# Patient Record
Sex: FEMALE | Race: ASIAN | NOT HISPANIC OR LATINO | Employment: FULL TIME | URBAN - METROPOLITAN AREA
[De-identification: names, ages, dates, MRNs, and addresses within clinical notes are randomized per-mention and may not be internally consistent; named-entity substitution may affect disease eponyms.]

---

## 2019-06-01 ENCOUNTER — HOSPITAL ENCOUNTER (OUTPATIENT)
Dept: URGENT CARE | Facility: CLINIC | Age: 34
Discharge: HOME | End: 2019-06-01
Attending: PHYSICIAN ASSISTANT

## 2019-06-03 LAB — BACTERIA SPEC AEROBE CULT: NORMAL

## 2020-11-17 ENCOUNTER — HOSPITAL ENCOUNTER (OUTPATIENT)
Dept: OTHER | Facility: HOSPITAL | Age: 35
Discharge: HOME | End: 2020-11-17
Attending: NURSE PRACTITIONER

## 2020-11-17 ENCOUNTER — OFFICE VISIT CONVERTED (OUTPATIENT)
Dept: INTERNAL MEDICINE | Facility: CLINIC | Age: 35
End: 2020-11-17
Attending: NURSE PRACTITIONER

## 2020-11-18 LAB
25(OH)D3 SERPL-MCNC: 16 NG/ML (ref 30–100)
ALBUMIN SERPL-MCNC: 4.4 G/DL (ref 3.5–5)
ALBUMIN/GLOB SERPL: 1.3 {RATIO} (ref 1.4–2.6)
ALP SERPL-CCNC: 59 U/L (ref 42–98)
ALT SERPL-CCNC: 65 U/L (ref 10–40)
ANION GAP SERPL CALC-SCNC: 18 MMOL/L (ref 8–19)
AST SERPL-CCNC: 45 U/L (ref 15–50)
BASOPHILS # BLD AUTO: 0.07 10*3/UL (ref 0–0.2)
BASOPHILS NFR BLD AUTO: 0.6 % (ref 0–3)
BILIRUB SERPL-MCNC: <0.15 MG/DL (ref 0.2–1.3)
BUN SERPL-MCNC: 9 MG/DL (ref 5–25)
BUN/CREAT SERPL: 15 {RATIO} (ref 6–20)
CALCIUM SERPL-MCNC: 9.4 MG/DL (ref 8.7–10.4)
CHLORIDE SERPL-SCNC: 105 MMOL/L (ref 99–111)
CHOLEST SERPL-MCNC: 192 MG/DL (ref 107–200)
CHOLEST/HDLC SERPL: 4.8 {RATIO} (ref 3–6)
CONV ABS IMM GRAN: 0.02 10*3/UL (ref 0–0.2)
CONV CO2: 20 MMOL/L (ref 22–32)
CONV IMMATURE GRAN: 0.2 % (ref 0–1.8)
CONV TOTAL PROTEIN: 7.8 G/DL (ref 6.3–8.2)
CREAT UR-MCNC: 0.61 MG/DL (ref 0.5–0.9)
DEPRECATED RDW RBC AUTO: 41.1 FL (ref 36.4–46.3)
EOSINOPHIL # BLD AUTO: 0.27 10*3/UL (ref 0–0.7)
EOSINOPHIL # BLD AUTO: 2.4 % (ref 0–7)
ERYTHROCYTE [DISTWIDTH] IN BLOOD BY AUTOMATED COUNT: 11.9 % (ref 11.7–14.4)
FOLATE SERPL-MCNC: 11.2 NG/ML (ref 4.8–20)
GFR SERPLBLD BASED ON 1.73 SQ M-ARVRAT: >60 ML/MIN/{1.73_M2}
GLOBULIN UR ELPH-MCNC: 3.4 G/DL (ref 2–3.5)
GLUCOSE SERPL-MCNC: 108 MG/DL (ref 65–99)
HCT VFR BLD AUTO: 43.5 % (ref 37–47)
HDLC SERPL-MCNC: 40 MG/DL (ref 40–60)
HGB BLD-MCNC: 14.3 G/DL (ref 12–16)
IRON SATN MFR SERPL: 16 % (ref 20–55)
IRON SERPL-MCNC: 76 UG/DL (ref 60–170)
LDLC SERPL CALC-MCNC: 86 MG/DL (ref 70–100)
LYMPHOCYTES # BLD AUTO: 3.74 10*3/UL (ref 1–5)
LYMPHOCYTES NFR BLD AUTO: 33.7 % (ref 20–45)
MCH RBC QN AUTO: 30.8 PG (ref 27–31)
MCHC RBC AUTO-ENTMCNC: 32.9 G/DL (ref 33–37)
MCV RBC AUTO: 93.8 FL (ref 81–99)
MONOCYTES # BLD AUTO: 0.8 10*3/UL (ref 0.2–1.2)
MONOCYTES NFR BLD AUTO: 7.2 % (ref 3–10)
NEUTROPHILS # BLD AUTO: 6.19 10*3/UL (ref 2–8)
NEUTROPHILS NFR BLD AUTO: 55.9 % (ref 30–85)
NRBC CBCN: 0 % (ref 0–0.7)
OSMOLALITY SERPL CALC.SUM OF ELEC: 287 MOSM/KG (ref 273–304)
PLATELET # BLD AUTO: 294 10*3/UL (ref 130–400)
PMV BLD AUTO: 9.9 FL (ref 9.4–12.3)
POTASSIUM SERPL-SCNC: 3.7 MMOL/L (ref 3.5–5.3)
RBC # BLD AUTO: 4.64 10*6/UL (ref 4.2–5.4)
SODIUM SERPL-SCNC: 139 MMOL/L (ref 135–147)
TIBC SERPL-MCNC: 472 UG/DL (ref 245–450)
TRANSFERRIN SERPL-MCNC: 330 MG/DL (ref 250–380)
TRIGL SERPL-MCNC: 332 MG/DL (ref 40–150)
TSH SERPL-ACNC: 2.21 M[IU]/L (ref 0.27–4.2)
VIT B12 SERPL-MCNC: 430 PG/ML (ref 211–911)
VLDLC SERPL-MCNC: 66 MG/DL (ref 5–37)
WBC # BLD AUTO: 11.09 10*3/UL (ref 4.8–10.8)

## 2020-12-28 ENCOUNTER — HOSPITAL ENCOUNTER (OUTPATIENT)
Dept: OTHER | Facility: HOSPITAL | Age: 35
Discharge: HOME | End: 2020-12-28
Attending: NURSE PRACTITIONER

## 2020-12-28 LAB
BASOPHILS # BLD AUTO: 0.11 10*3/UL (ref 0–0.2)
BASOPHILS NFR BLD AUTO: 0.9 % (ref 0–3)
CONV ABS IMM GRAN: 0.03 10*3/UL (ref 0–0.2)
CONV IMMATURE GRAN: 0.3 % (ref 0–1.8)
DEPRECATED RDW RBC AUTO: 42.9 FL (ref 36.4–46.3)
EOSINOPHIL # BLD AUTO: 0.34 10*3/UL (ref 0–0.7)
EOSINOPHIL # BLD AUTO: 2.9 % (ref 0–7)
ERYTHROCYTE [DISTWIDTH] IN BLOOD BY AUTOMATED COUNT: 12.1 % (ref 11.7–14.4)
HCT VFR BLD AUTO: 44.4 % (ref 37–47)
HGB BLD-MCNC: 14.1 G/DL (ref 12–16)
LYMPHOCYTES # BLD AUTO: 4.61 10*3/UL (ref 1–5)
LYMPHOCYTES NFR BLD AUTO: 39.7 % (ref 20–45)
MCH RBC QN AUTO: 30.9 PG (ref 27–31)
MCHC RBC AUTO-ENTMCNC: 31.8 G/DL (ref 33–37)
MCV RBC AUTO: 97.2 FL (ref 81–99)
MONOCYTES # BLD AUTO: 0.86 10*3/UL (ref 0.2–1.2)
MONOCYTES NFR BLD AUTO: 7.4 % (ref 3–10)
NEUTROPHILS # BLD AUTO: 5.66 10*3/UL (ref 2–8)
NEUTROPHILS NFR BLD AUTO: 48.8 % (ref 30–85)
NRBC CBCN: 0 % (ref 0–0.7)
PLATELET # BLD AUTO: 301 10*3/UL (ref 130–400)
PMV BLD AUTO: 10 FL (ref 9.4–12.3)
RBC # BLD AUTO: 4.57 10*6/UL (ref 4.2–5.4)
WBC # BLD AUTO: 11.61 10*3/UL (ref 4.8–10.8)

## 2021-03-25 ENCOUNTER — CONVERSION ENCOUNTER (OUTPATIENT)
Dept: INTERNAL MEDICINE | Facility: CLINIC | Age: 36
End: 2021-03-25

## 2021-03-25 ENCOUNTER — OFFICE VISIT CONVERTED (OUTPATIENT)
Dept: INTERNAL MEDICINE | Facility: CLINIC | Age: 36
End: 2021-03-25
Attending: NURSE PRACTITIONER

## 2021-03-25 ENCOUNTER — HOSPITAL ENCOUNTER (OUTPATIENT)
Dept: OTHER | Facility: HOSPITAL | Age: 36
Discharge: HOME | End: 2021-03-25
Attending: NURSE PRACTITIONER

## 2021-03-25 LAB
25(OH)D3 SERPL-MCNC: 19.4 NG/ML (ref 30–100)
ALBUMIN SERPL-MCNC: 4.1 G/DL (ref 3.5–5)
ALBUMIN/GLOB SERPL: 1.2 {RATIO} (ref 1.4–2.6)
ALP SERPL-CCNC: 48 U/L (ref 42–98)
ALT SERPL-CCNC: 97 U/L (ref 10–40)
ANION GAP SERPL CALC-SCNC: 17 MMOL/L (ref 8–19)
AST SERPL-CCNC: 89 U/L (ref 15–50)
BASOPHILS # BLD AUTO: 0.11 10*3/UL (ref 0–0.2)
BASOPHILS NFR BLD AUTO: 1.1 % (ref 0–3)
BILIRUB SERPL-MCNC: 0.21 MG/DL (ref 0.2–1.3)
BUN SERPL-MCNC: 9 MG/DL (ref 5–25)
BUN/CREAT SERPL: 15 {RATIO} (ref 6–20)
CALCIUM SERPL-MCNC: 9 MG/DL (ref 8.7–10.4)
CHLORIDE SERPL-SCNC: 105 MMOL/L (ref 99–111)
CONV ABS IMM GRAN: 0.02 10*3/UL (ref 0–0.2)
CONV CO2: 22 MMOL/L (ref 22–32)
CONV IMMATURE GRAN: 0.2 % (ref 0–1.8)
CONV TOTAL PROTEIN: 7.4 G/DL (ref 6.3–8.2)
CREAT UR-MCNC: 0.59 MG/DL (ref 0.5–0.9)
DEPRECATED RDW RBC AUTO: 40.9 FL (ref 36.4–46.3)
EOSINOPHIL # BLD AUTO: 0.37 10*3/UL (ref 0–0.7)
EOSINOPHIL # BLD AUTO: 3.9 % (ref 0–7)
ERYTHROCYTE [DISTWIDTH] IN BLOOD BY AUTOMATED COUNT: 11.8 % (ref 11.7–14.4)
GFR SERPLBLD BASED ON 1.73 SQ M-ARVRAT: >60 ML/MIN/{1.73_M2}
GLOBULIN UR ELPH-MCNC: 3.3 G/DL (ref 2–3.5)
GLUCOSE SERPL-MCNC: 94 MG/DL (ref 65–99)
HCT VFR BLD AUTO: 41.1 % (ref 37–47)
HGB BLD-MCNC: 13.2 G/DL (ref 12–16)
LYMPHOCYTES # BLD AUTO: 3.54 10*3/UL (ref 1–5)
LYMPHOCYTES NFR BLD AUTO: 36.9 % (ref 20–45)
MCH RBC QN AUTO: 30.1 PG (ref 27–31)
MCHC RBC AUTO-ENTMCNC: 32.1 G/DL (ref 33–37)
MCV RBC AUTO: 93.8 FL (ref 81–99)
MONOCYTES # BLD AUTO: 0.77 10*3/UL (ref 0.2–1.2)
MONOCYTES NFR BLD AUTO: 8 % (ref 3–10)
NEUTROPHILS # BLD AUTO: 4.78 10*3/UL (ref 2–8)
NEUTROPHILS NFR BLD AUTO: 49.9 % (ref 30–85)
NRBC CBCN: 0 % (ref 0–0.7)
OSMOLALITY SERPL CALC.SUM OF ELEC: 288 MOSM/KG (ref 273–304)
PLATELET # BLD AUTO: 289 10*3/UL (ref 130–400)
PMV BLD AUTO: 10.1 FL (ref 9.4–12.3)
POTASSIUM SERPL-SCNC: 4.3 MMOL/L (ref 3.5–5.3)
RBC # BLD AUTO: 4.38 10*6/UL (ref 4.2–5.4)
SODIUM SERPL-SCNC: 140 MMOL/L (ref 135–147)
WBC # BLD AUTO: 9.59 10*3/UL (ref 4.8–10.8)

## 2021-04-15 ENCOUNTER — HOSPITAL ENCOUNTER (OUTPATIENT)
Dept: VACCINE CLINIC | Facility: HOSPITAL | Age: 36
Discharge: HOME | End: 2021-04-15
Attending: INTERNAL MEDICINE

## 2021-05-06 ENCOUNTER — HOSPITAL ENCOUNTER (OUTPATIENT)
Dept: VACCINE CLINIC | Facility: HOSPITAL | Age: 36
Discharge: HOME | End: 2021-05-06
Attending: INTERNAL MEDICINE

## 2021-05-10 NOTE — H&P
History and Physical      Patient Name: Minh Drake   Patient ID: [id number]   Sex: Female   YOB: 1985        Visit Date: November 17, 2020    Provider: KYUNG Falcon   Location: [facility]   Location Address: [address] [place] [address], [address]  [place], [place]  [id number]   Location Phone: [phone]          Chief Complaint  · Est. care      History Of Present Illness  Minh Drake is a 35 year old  female who presents for evaluation and treatment of:      Previous pcp: [facility] primary in [facility] only once  labs: few years.   Specialists: EPW, dermatology allergy testing in 2019, Chiropractic([facility]), ENT allergies-f/u prn.   LMP: unknown   PAP: 10/27/2020  Mammogram: n/a  Colonoscopy: n/a  Influenza vaccine: had one in late September.   Eye exam: [facility], peviously 20/20.  Dental exam: Roxana Haro.      fatigue  she reports working over 50 hrs a week. [employer] in [employer]  insomnia-she is not sleeping well  neck/shoulder paingoing to chiropractor once weekly  taking aleve prn  knows that she needs to exercise    left lower quadrant pain-seeing OBGYN, US scheduled Friday       Past Medical History  Disease Name Date Onset Notes   Allergic Rhinitis --  --    Asthma --  4th grade.   Broken bones --  Elementary.    Migraine headache --  --    Sinus trouble --  --          Medication List  Name Date Started Instructions   Benadryl 25 mg oral capsule  take 1 capsule by oral route once a day (in the evening)   cetirizine 10 mg oral tablet  take 1 tablet (10 mg) by oral route once daily   Depo-Provera 150 mg/mL intramuscular suspension  inject 150 mg by intramuscular route every 3 months   EluRyng 0.12-0.015 mg/24 hr vaginal ring  insert 1 vaginal ring by vaginal route once a month leave in place for 3 weeks, remove for 1 week   naproxen 500 mg oral tablet  take 1 tablet by oral route As needed         Allergy List  Allergen Name Date Reaction Notes  "  Keflex --  --  --        Allergies Reconciled  Family Medical History  Disease Name Relative/Age Notes   Family history of cerebrovascular accident Mother/   --    Family history of prostate cancer in father  --          Reproductive History  Menstrual   Pregnancy Summary   Total Pregnancies: 2 Full Term: 2 Premature: 0   Ab Induced: 0 Ab Spontaneous: 0 Ectopics: 0   Multiples: 0 Livin         Social History  Finding Status Start/Stop Quantity Notes   Alcohol Never --/-- --  --    Tobacco Never --/-- --  --          Review of Systems  · Constitutional  o Admits  o : fatigue  o Denies  o : fever, weight loss, weight gain  · Cardiovascular  o Denies  o : lower extremity edema, claudication, chest pressure, palpitations  · Respiratory  o Denies  o : shortness of breath, wheezing, cough, hemoptysis, dyspnea on exertion  · Gastrointestinal  o Admits  o : abdominal pain  o Denies  o : nausea, vomiting, diarrhea, constipation      Vitals  Date Time BP Position Site L\R Cuff Size HR RR TEMP (F) WT  HT  BMI kg/m2 BSA m2 O2 Sat FR L/min FiO2        2020 02:43 /76 Sitting    98 - R  96 178lbs 0oz 5'  4\" 30.55 1.91 98 %  21%          Physical Examination  · Constitutional  o Appearance  o : no acute distress, well-nourished  · Head and Face  o Head  o :   § Inspection  § : atraumatic, normocephalic  · Eyes  o Eyes  o : extraocular movements intact, no scleral icterus, no conjunctival injection  · Ears, Nose, Mouth and Throat  o Ears  o :   § External Ears  § : normal  § Otoscopic Examination  § : tympanic membrane appearance within normal limits bilaterally  o Nose  o :   § Intranasal Exam  § : nares patent  o Oral Cavity  o :   § Oral Mucosa  § : moist mucous membranes  · Respiratory  o Respiratory Effort  o : breathing comfortably, symmetric chest rise  o Auscultation of Lungs  o : clear to asculatation bilaterally, no wheezes, rales, or rhonchii  · Cardiovascular  o Heart  o :   § Auscultation of " Heart  § : regular rate and rhythm, no murmurs, rubs, or gallops  o Peripheral Vascular System  o :   § Extremities  § : no edema  · Gastrointestinal  o Abdominal Examination  o :   § Abdomen  § : bowel sounds present, non-distended, non-tender  · Lymphatic  o Neck  o : no lymphadenopathy present  · Neurologic  o Mental Status Examination  o :   § Orientation  § : grossly oriented to person, place and time  o Gait and Station  o :   § Gait Screening  § : normal gait  · Psychiatric  o General  o : normal mood and affect          Assessment  · Annual physical exam     V70.0/Z00.00  labs tody  · Fatigue     780.79/R53.83  will check labs today  · Screening for depression     V79.0/Z13.89    Problems Reconciled  Plan  · Orders  o Iron panel (iron, TIBC, transferrin saturation) (79735, 96667, 82689) - 780.79/R53.83 - 11/17/2020  o B12 Folate levels (B12FO) - 780.79/R53.83 - 11/17/2020  o ACO-18: Positive screen for clinical depression using a standardized tool and a follow-up plan documented () - V79.0/Z13.89 - 11/17/2020   phq9 score of 12.  o Physical, Primary Care Panel (CBC, CMP, Lipid, TSH) [facility] (95240, 12587, 30593, 57206) - - 11/17/2020  o Vitamin D (25-Hydroxy) Level (54997) - V70.0/Z00.00, V79.0/Z13.89, 780.79/R53.83 - 11/17/2020  o ACO-39: Current medications updated and reviewed (, 1159F) - - 11/17/2020  · Medications  o tizanidine 2 mg oral capsule   SIG: take 1 capsule (2 mg) by oral route once daily at bedtime for 30 days   DISP: (30) Capsule with 1 refills  Prescribed on 11/17/2020     o Medications have been Reconciled  o Transition of Care or Provider Policy  · Instructions  o Reviewed health maintenance flowsheet and updated information. Orders were placed and/or patient's response was documented.  o Depression Screen completed and scanned into the EMR under the designated folder within the patient's documents.  o Today's PHQ-9 result is _12.__  o Patient was educated/instructed on their  diagnosis, treatment and medications prior to discharge from the clinic today.            Electronically Signed by: KYUNG Falcon -Author on November 17, 2020 05:38:05 PM

## 2021-05-14 VITALS
BODY MASS INDEX: 29.71 KG/M2 | TEMPERATURE: 98.4 F | DIASTOLIC BLOOD PRESSURE: 68 MMHG | SYSTOLIC BLOOD PRESSURE: 114 MMHG | OXYGEN SATURATION: 98 % | HEIGHT: 64 IN | HEART RATE: 96 BPM | WEIGHT: 174 LBS

## 2021-05-14 VITALS
DIASTOLIC BLOOD PRESSURE: 76 MMHG | HEIGHT: 64 IN | HEART RATE: 98 BPM | BODY MASS INDEX: 30.39 KG/M2 | SYSTOLIC BLOOD PRESSURE: 110 MMHG | WEIGHT: 178 LBS | OXYGEN SATURATION: 98 % | TEMPERATURE: 96 F

## 2021-05-14 NOTE — PROGRESS NOTES
"   Progress Note      Patient Name: Minh Drake   Patient ID: [id number]   Sex: Female   YOB: 1985        Visit Date: March 25, 2021    Provider: KYUNG Falcon   [facility]   Location Address: [address] [place] [address], [address]  [place], [place]  [id number]   Location Phone: [phone]          Chief Complaint  · Follow up on lab work  · \"Question about naproxin sodium\"  · \"Questions about covid vaccine\"      History Of Present Illness  Minh Drake is a 35 year old  female who presents for evaluation and treatment of:      f/u    fatigue-about the same  she reports  has sleep study this weekend and this will help her sleep if his issues are resolved    leukocytosis-additional labs pending    plans to get covid vaccine    naproxen use once weekly       Past Medical History  Disease Name Date Onset Notes   Allergic Rhinitis --  --    Asthma --  4th grade.   Broken bones --  Elementary.    Migraine headache --  --    Sinus trouble --  --          Medication List  Name Date Started Instructions   Benadryl 25 mg oral capsule  take 1 capsule by oral route once a day (in the evening)   cetirizine 10 mg oral tablet  take 1 tablet (10 mg) by oral route once daily   EluRyng 0.12-0.015 mg/24 hr vaginal ring  insert 1 vaginal ring by vaginal route once a month leave in place for 3 weeks, remove for 1 week   naproxen 500 mg oral tablet  take 1 tablet by oral route As needed   VITAMIN D2 50,000IU (ERGO) CAP RX 02/19/2021 TAKE 1 CAPSULE BY MOUTH 1 TIME WEEKLY         Allergy List  Allergen Name Date Reaction Notes   Keflex --  --  --          Family Medical History  Disease Name Relative/Age Notes   Family history of cerebrovascular accident Mother/   --    Family history of prostate cancer in father  --          Reproductive History  Menstrual   Pregnancy Summary   Total Pregnancies: 2 Full Term: 2 Premature: 0   Ab Induced: 0 Ab Spontaneous: 0 Ectopics: 0 " "  Multiples: 0 Livin         Social History  Finding Status Start/Stop Quantity Notes   Alcohol Never --/-- --  --    Tobacco Never --/-- --  --          Review of Systems  · Constitutional  o Admits  o : fatigue  o Denies  o : fever, weight loss, weight gain  · Cardiovascular  o Denies  o : lower extremity edema, claudication, chest pressure, palpitations  · Respiratory  o Denies  o : shortness of breath, wheezing, cough, hemoptysis, dyspnea on exertion  · Gastrointestinal  o Denies  o : nausea, vomiting, diarrhea, constipation, abdominal pain      Vitals  Date Time BP Position Site L\R Cuff Size HR RR TEMP (F) WT  HT  BMI kg/m2 BSA m2 O2 Sat FR L/min FiO2 HC       2020 02:43 /76 Sitting    98 - R  96 178lbs 0oz 5'  4\" 30.55 1.91 98 %  21%    2021 11:06 /68 Sitting    96 - R  98.4 174lbs 0oz 5'  4\" 29.87 1.89 98 %  21%          Physical Examination  · Constitutional  o Appearance  o : no acute distress, well-nourished  · Head and Face  o Head  o :   § Inspection  § : atraumatic, normocephalic  · Eyes  o Eyes  o : extraocular movements intact, no scleral icterus, no conjunctival injection  · Ears, Nose, Mouth and Throat  o Ears  o :   § External Ears  § : normal  o Nose  o :   § Intranasal Exam  § : nares patent  o Oral Cavity  o :   § Oral Mucosa  § : moist mucous membranes  · Respiratory  o Respiratory Effort  o : breathing comfortably, symmetric chest rise  o Auscultation of Lungs  o : clear to asculatation bilaterally, no wheezes, rales, or rhonchii  · Cardiovascular  o Heart  o :   § Auscultation of Heart  § : regular rate and rhythm, no murmurs, rubs, or gallops  o Peripheral Vascular System  o :   § Extremities  § : no edema  · Lymphatic  o Neck  o : no lymphadenopathy present  · Neurologic  o Mental Status Examination  o :   § Orientation  § : grossly oriented to person, place and time  o Gait and Station  o :   § Gait Screening  § : normal gait  · Psychiatric  o General  o : " normal mood and affect          Assessment  · Fatigue     780.79/R53.83  pending labs drawn in the office today. Will review and adjust medications as needed  · Vitamin D deficiency     268.9/E55.9  We will check labs in the office today. Will review and adjust medications as needed  · Leukocytosis     288.60/D72.829  pending labs drawn in the office today. Will review and adjust medications as needed    Problems Reconciled  Plan  · Orders  o ACO-39: Current medications updated and reviewed (1159F, ) - - 03/25/2021  · Medications  o Medications have been Reconciled  o Transition of Care or Provider Policy  · Instructions  o Patient was educated/instructed on their diagnosis, treatment and medications prior to discharge from the clinic today.  o Call the office with any concerns or questions.  · Disposition  o Call or Return if symptoms worsen or persist.  o Follow up in 6 months  o labs drawn in house today            Electronically Signed by: KYUNG Falcon -Author on March 30, 2021 12:55:54 PM

## 2021-07-13 ENCOUNTER — TRANSCRIBE ORDERS (OUTPATIENT)
Dept: ADMINISTRATIVE | Facility: HOSPITAL | Age: 36
End: 2021-07-13

## 2021-07-13 DIAGNOSIS — Z12.31 ENCOUNTER FOR MAMMOGRAM TO ESTABLISH BASELINE MAMMOGRAM: Primary | ICD-10-CM

## 2021-07-15 ENCOUNTER — TELEPHONE (OUTPATIENT)
Dept: INTERNAL MEDICINE | Facility: CLINIC | Age: 36
End: 2021-07-15

## 2021-07-15 DIAGNOSIS — E55.9 VITAMIN D DEFICIENCY: Primary | ICD-10-CM

## 2021-07-15 DIAGNOSIS — R79.89 ABNORMAL LIVER FUNCTION TEST: ICD-10-CM

## 2021-07-15 NOTE — TELEPHONE ENCOUNTER
Patient had abnormal LFT's and low vit D in march, Jackelyn requested repeat labs in 3 months.  Placed order.

## 2021-07-15 NOTE — TELEPHONE ENCOUNTER
Caller: Minh Drake    Relationship: Self    Best call back number: [phone]    What orders are you requesting (i.e. lab or imaging): LABS    In what timeframe would the patient need to come in: AS SOON AS POSSIBLE     Where will you receive your lab/imaging services: IN OFFICE

## 2021-07-27 ENCOUNTER — CLINICAL SUPPORT (OUTPATIENT)
Dept: INTERNAL MEDICINE | Facility: CLINIC | Age: 36
End: 2021-07-27

## 2021-07-27 DIAGNOSIS — E55.9 VITAMIN D DEFICIENCY: ICD-10-CM

## 2021-07-27 DIAGNOSIS — R79.89 ABNORMAL LIVER FUNCTION TEST: ICD-10-CM

## 2021-07-27 LAB
25(OH)D3 SERPL-MCNC: 33.1 NG/ML (ref 30–100)
ALBUMIN SERPL-MCNC: 4.4 G/DL (ref 3.5–5.2)
ALBUMIN/GLOB SERPL: 1.4 G/DL
ALP SERPL-CCNC: 48 U/L (ref 39–117)
ALT SERPL W P-5'-P-CCNC: 57 U/L (ref 1–33)
ANION GAP SERPL CALCULATED.3IONS-SCNC: 10.8 MMOL/L (ref 5–15)
AST SERPL-CCNC: 71 U/L (ref 1–32)
BILIRUB SERPL-MCNC: 0.2 MG/DL (ref 0–1.2)
BUN SERPL-MCNC: 11 MG/DL (ref 6–20)
BUN/CREAT SERPL: 13.4 (ref 7–25)
CALCIUM SPEC-SCNC: 9.2 MG/DL (ref 8.6–10.5)
CHLORIDE SERPL-SCNC: 105 MMOL/L (ref 98–107)
CO2 SERPL-SCNC: 22.2 MMOL/L (ref 22–29)
CREAT SERPL-MCNC: 0.82 MG/DL (ref 0.57–1)
GFR SERPL CREATININE-BSD FRML MDRD: 80 ML/MIN/1.73
GFR SERPL CREATININE-BSD FRML MDRD: 97 ML/MIN/1.73
GLOBULIN UR ELPH-MCNC: 3.2 GM/DL
GLUCOSE SERPL-MCNC: 100 MG/DL (ref 65–99)
POTASSIUM SERPL-SCNC: 4.2 MMOL/L (ref 3.5–5.2)
PROT SERPL-MCNC: 7.6 G/DL (ref 6–8.5)
SODIUM SERPL-SCNC: 138 MMOL/L (ref 136–145)

## 2021-07-27 PROCEDURE — 80053 COMPREHEN METABOLIC PANEL: CPT | Performed by: PHYSICIAN ASSISTANT

## 2021-07-27 PROCEDURE — 36415 COLL VENOUS BLD VENIPUNCTURE: CPT | Performed by: PHYSICIAN ASSISTANT

## 2021-07-27 PROCEDURE — 82306 VITAMIN D 25 HYDROXY: CPT | Performed by: PHYSICIAN ASSISTANT

## 2021-08-10 ENCOUNTER — OFFICE VISIT (OUTPATIENT)
Dept: INTERNAL MEDICINE | Facility: CLINIC | Age: 36
End: 2021-08-10

## 2021-08-10 VITALS
DIASTOLIC BLOOD PRESSURE: 76 MMHG | HEART RATE: 99 BPM | WEIGHT: 172.13 LBS | TEMPERATURE: 98.2 F | SYSTOLIC BLOOD PRESSURE: 114 MMHG | BODY MASS INDEX: 29.39 KG/M2 | HEIGHT: 64 IN | OXYGEN SATURATION: 98 %

## 2021-08-10 DIAGNOSIS — Z80.3 FAMILY HISTORY OF BREAST CANCER IN SISTER: ICD-10-CM

## 2021-08-10 DIAGNOSIS — G47.00 INSOMNIA, UNSPECIFIED TYPE: ICD-10-CM

## 2021-08-10 DIAGNOSIS — J01.80 ACUTE NON-RECURRENT SINUSITIS OF OTHER SINUS: Primary | ICD-10-CM

## 2021-08-10 PROBLEM — J01.90 ACUTE INFECTION OF NASAL SINUS: Status: ACTIVE | Noted: 2021-08-10

## 2021-08-10 PROCEDURE — 99213 OFFICE O/P EST LOW 20 MIN: CPT | Performed by: PHYSICIAN ASSISTANT

## 2021-08-10 RX ORDER — ETONOGESTREL AND ETHINYL ESTRADIOL .12; .015 MG/D; MG/D
RING VAGINAL
COMMUNITY
Start: 2021-07-19 | End: 2021-10-14

## 2021-08-10 RX ORDER — TRAZODONE HYDROCHLORIDE 50 MG/1
50 TABLET ORAL NIGHTLY
Qty: 30 TABLET | Refills: 1 | Status: SHIPPED | OUTPATIENT
Start: 2021-08-10 | End: 2021-10-05

## 2021-08-10 RX ORDER — NAPROXEN 500 MG/1
1 TABLET ORAL AS NEEDED
COMMUNITY
End: 2023-01-18

## 2021-08-10 RX ORDER — ERGOCALCIFEROL 1.25 MG/1
50000 CAPSULE ORAL
Qty: 5 CAPSULE | Refills: 0 | Status: SHIPPED | OUTPATIENT
Start: 2021-08-10 | End: 2021-09-13

## 2021-08-10 RX ORDER — CETIRIZINE HYDROCHLORIDE 10 MG/1
1 TABLET ORAL DAILY
COMMUNITY

## 2021-08-10 RX ORDER — DIPHENHYDRAMINE HCL 25 MG
1 CAPSULE ORAL EVERY EVENING
COMMUNITY
End: 2022-04-26

## 2021-08-10 RX ORDER — ERGOCALCIFEROL 1.25 MG/1
50000 CAPSULE ORAL
COMMUNITY
Start: 2021-06-18 | End: 2021-08-10 | Stop reason: SDUPTHER

## 2021-08-10 RX ORDER — AMOXICILLIN AND CLAVULANATE POTASSIUM 875; 125 MG/1; MG/1
1 TABLET, FILM COATED ORAL 2 TIMES DAILY
Qty: 20 TABLET | Refills: 0 | Status: SHIPPED | OUTPATIENT
Start: 2021-08-10 | End: 2021-08-20

## 2021-08-10 NOTE — ASSESSMENT & PLAN NOTE
Discussed sister's recent diagnosis of breast cancer. Discussed screening. Pt has mamogram scheduled in September. Discussed worry and insomnia likely secondary to concern about her sister's health and wellbeing, especially since she is located in [facility].

## 2021-08-10 NOTE — ASSESSMENT & PLAN NOTE
Prescribed trazadone. Instucted pt to take PRN. Can take every night if needed. Will follow up in one month to assess how medication in working. Could also consider Hydroxyzine if symptoms persist or even SSRI.

## 2021-08-10 NOTE — PROGRESS NOTES
"Chief Complaint  Insomnia, Other (sister has just got diagnosed with breast cancer type 2), and Loss Of Smell (says this is because of congestion in nose, drainage has started)    Subjective          Minh Drake presents to [facility]  Sister just diagnosed with breast cancer, patient has mammogram scheduled in September.  Sister is 44 years old.  No family hx of breast cancer.  Patient does not know the type of breast cancer.  Reports sister is getting biweekly chemotherapy and will be surgery in 3 months.     Insomnia- worse since sister diagnosed with cancer.  Her sister lives near [facility] and is on a different time zone, so pt says her sleep schedule has been off.  Pt says her mind is racing at night, which keeps her from being able to sleep.  Denies feels of worry and anxiety during the day.   Pt states she is not interested in therapy.     Congestion- Pt reports having a sinus infection that started 2 weeks ago.  She has had \"severe drainage\" and a runny nose that then turned into a stuffy nose.  She reports sinus pressure and a loss of smell due to being congested.  Denies loss of taste, fever, cough.  She took Benadryl every 4 hours over the weekend, but symptoms persisted.  She has had the COVID vaccine.   No one else is sick at home.      Objective   Vital Signs:   /76   Pulse 99   Temp 98.2 °F (36.8 °C) (Temporal)   Ht 162.6 cm (64\")   Wt 78.1 kg (172 lb 2 oz)   SpO2 98%   BMI 29.55 kg/m²     Physical Exam  Vitals reviewed.   Constitutional:       Appearance: Normal appearance. She is well-developed.   HENT:      Head: Normocephalic and atraumatic.      Right Ear: Tympanic membrane, ear canal and external ear normal.      Left Ear: Tympanic membrane, ear canal and external ear normal.      Mouth/Throat:      Pharynx: No oropharyngeal exudate.   Eyes:      Conjunctiva/sclera: Conjunctivae normal.      Pupils: Pupils are equal, round, and " reactive to light.   Cardiovascular:      Rate and Rhythm: Normal rate and regular rhythm.      Heart sounds: No murmur heard.   No friction rub. No gallop.    Pulmonary:      Effort: Pulmonary effort is normal.      Breath sounds: Normal breath sounds. No wheezing or rhonchi.   Abdominal:      General: Bowel sounds are normal. There is no distension.      Palpations: Abdomen is soft.      Tenderness: There is no abdominal tenderness.   Skin:     General: Skin is warm and dry.   Neurological:      Mental Status: She is alert and oriented to person, place, and time.      Cranial Nerves: No cranial nerve deficit.   Psychiatric:         Mood and Affect: Mood and affect normal.         Behavior: Behavior normal.         Thought Content: Thought content normal.         Judgment: Judgment normal.        Result Review :          Procedures      Assessment and Plan    Diagnoses and all orders for this visit:    1. Acute non-recurrent sinusitis of other sinus (Primary)  Comments:  Discussed ddx. Prescribed augmetin due to long duration of symptoms.     2. Insomnia, unspecified type  Comments:  Prescribed trazadone. Instucted pt to take PRN. Can take every night if needed. Will follow up in one month to assess how medication in working. Call if symptoms of worry and anxiety increase during the day. Can adjust medication at next appointment if not working.     3. Family history of breast cancer in sister  Comments:  Discussed sister's recent diagnosis of breast cancer. Discussed screening. Pt has mamogram scheduled in September. Discussed worry and insomnia realted to her sister's diagnosis.     Other orders  -     vitamin D (ERGOCALCIFEROL) 1.25 MG (41735 UT) capsule capsule; Take 1 capsule by mouth Every 7 (Seven) Days.  Dispense: 5 capsule; Refill: 0  -     traZODone (DESYREL) 50 MG tablet; Take 1 tablet by mouth Every Night for 30 days.  Dispense: 30 tablet; Refill: 1  -     amoxicillin-clavulanate (Augmentin) 875-125 MG  per tablet; Take 1 tablet by mouth 2 (Two) Times a Day for 10 days.  Dispense: 20 tablet; Refill: 0        Follow Up   Return in about 4 weeks (around 9/7/2021).  Patient was given instructions and counseling regarding her condition or for health maintenance advice. Please see specific information pulled into the AVS if appropriate.

## 2021-08-28 NOTE — ADDENDUM NOTE
Addended by: FRANCISCO JAVIER SCHREIBER on: 8/27/2021 10:53 PM     Modules accepted: Level of Service

## 2021-09-12 DIAGNOSIS — Z80.3 FAMILY HISTORY OF BREAST CANCER IN SISTER: ICD-10-CM

## 2021-09-12 RX ORDER — ERGOCALCIFEROL 1.25 MG/1
CAPSULE ORAL
Qty: 5 CAPSULE | Refills: 0 | Status: CANCELLED | OUTPATIENT
Start: 2021-09-12

## 2021-09-13 RX ORDER — ERGOCALCIFEROL 1.25 MG/1
CAPSULE ORAL
Qty: 5 CAPSULE | Refills: 0 | OUTPATIENT
Start: 2021-09-13 | End: 2022-01-12

## 2021-09-20 ENCOUNTER — HOSPITAL ENCOUNTER (OUTPATIENT)
Dept: MAMMOGRAPHY | Facility: HOSPITAL | Age: 36
Discharge: HOME | End: 2021-09-20
Admitting: OBSTETRICS & GYNECOLOGY

## 2021-09-20 DIAGNOSIS — Z12.31 ENCOUNTER FOR MAMMOGRAM TO ESTABLISH BASELINE MAMMOGRAM: ICD-10-CM

## 2021-09-20 PROCEDURE — 77063 BREAST TOMOSYNTHESIS BI: CPT

## 2021-09-20 PROCEDURE — 77067 SCR MAMMO BI INCL CAD: CPT

## 2021-09-21 ENCOUNTER — TELEPHONE (OUTPATIENT)
Dept: OBSTETRICS AND GYNECOLOGY | Facility: CLINIC | Age: 36
End: 2021-09-21

## 2021-09-21 DIAGNOSIS — R92.8 ABNORMAL MAMMOGRAM: Primary | ICD-10-CM

## 2021-09-21 NOTE — TELEPHONE ENCOUNTER
----- Message from Emily Escudero MD sent at 9/21/2021 10:28 AM EDT -----  I have placed order for diagnostic right breast ultrasound.  Please call patient and inform her of need for further testing.

## 2021-09-27 PROBLEM — J30.9 ALLERGIC RHINITIS: Status: ACTIVE | Noted: 2021-09-27

## 2021-09-27 PROBLEM — G43.909 MIGRAINE HEADACHE: Status: ACTIVE | Noted: 2021-09-27

## 2021-09-27 PROBLEM — J45.909 ASTHMA: Status: ACTIVE | Noted: 2021-09-27

## 2021-09-28 ENCOUNTER — OFFICE VISIT (OUTPATIENT)
Dept: INTERNAL MEDICINE | Facility: CLINIC | Age: 36
End: 2021-09-28

## 2021-09-28 VITALS
SYSTOLIC BLOOD PRESSURE: 116 MMHG | DIASTOLIC BLOOD PRESSURE: 76 MMHG | TEMPERATURE: 97.4 F | HEIGHT: 64 IN | WEIGHT: 172.25 LBS | HEART RATE: 104 BPM | BODY MASS INDEX: 29.41 KG/M2 | OXYGEN SATURATION: 98 %

## 2021-09-28 DIAGNOSIS — J30.9 ALLERGIC RHINITIS, UNSPECIFIED SEASONALITY, UNSPECIFIED TRIGGER: ICD-10-CM

## 2021-09-28 DIAGNOSIS — G43.909 MIGRAINE WITHOUT STATUS MIGRAINOSUS, NOT INTRACTABLE, UNSPECIFIED MIGRAINE TYPE: ICD-10-CM

## 2021-09-28 DIAGNOSIS — G47.00 INSOMNIA, UNSPECIFIED TYPE: Primary | ICD-10-CM

## 2021-09-28 DIAGNOSIS — J45.909 ASTHMA, UNSPECIFIED ASTHMA SEVERITY, UNSPECIFIED WHETHER COMPLICATED, UNSPECIFIED WHETHER PERSISTENT: ICD-10-CM

## 2021-09-28 DIAGNOSIS — J01.80 ACUTE NON-RECURRENT SINUSITIS OF OTHER SINUS: ICD-10-CM

## 2021-09-28 PROBLEM — J01.90 ACUTE INFECTION OF NASAL SINUS: Chronic | Status: ACTIVE | Noted: 2021-08-10

## 2021-09-28 LAB
EXPIRATION DATE: NORMAL
EXPIRATION DATE: NORMAL
FLUAV AG NPH QL: NEGATIVE
FLUBV AG NPH QL: NEGATIVE
INTERNAL CONTROL: NORMAL
INTERNAL CONTROL: NORMAL
Lab: NORMAL
Lab: NORMAL
SARS-COV-2 AG UPPER RESP QL IA.RAPID: NOT DETECTED

## 2021-09-28 PROCEDURE — 99395 PREV VISIT EST AGE 18-39: CPT | Performed by: NURSE PRACTITIONER

## 2021-09-28 PROCEDURE — 87428 SARSCOV & INF VIR A&B AG IA: CPT | Performed by: NURSE PRACTITIONER

## 2021-09-28 RX ORDER — DOXYCYCLINE HYCLATE 100 MG/1
100 CAPSULE ORAL 2 TIMES DAILY
COMMUNITY
End: 2022-04-15

## 2021-09-28 RX ORDER — FLUTICASONE PROPIONATE 50 MCG
2 SPRAY, SUSPENSION (ML) NASAL DAILY
Qty: 11.1 ML | Refills: 5 | Status: SHIPPED | OUTPATIENT
Start: 2021-09-28 | End: 2022-10-05

## 2021-09-28 NOTE — PROGRESS NOTES
"Chief Complaint  Annual physical  Earache and nasal congestion    Subjective          Minh Drake presents to [facility]  History of Present Illness  She reports having a right-sided earache that started about 3 weeks ago.  Initially she was treated with antibiotics but unsure of which one.  When this did not resolve she was given steroids which she only took for 1 to 2 days.  Then she returned to [facility] on Friday with worsening pain.  She was given doxycycline at this time and has been taking it for the last 4 days.  She reports that she has had mild improvement in ear pain.  She also reports having sinus pressure and minimal drainage.  Denies fever, chills, body aches    Sleeping well without any issues.  Trazodone a few times a week.    Allergies have been well controlled until the last few weeks.  She is currently taking cetirizine.  Has not been using Flonase recently.    Recently saw Dr. Escudero.  Abnormal mammogram.  She has an appointment for an ultrasound for further eval of asymmetry.  She reports that her sister who is 9 years her senior recently was diagnosed with breast cancer.  Objective   Vital Signs:   /76   Pulse 104   Temp 97.4 °F (36.3 °C) (Temporal)   Ht 162.6 cm (64\")   Wt 78.1 kg (172 lb 4 oz)   SpO2 98%   BMI 29.57 kg/m²     Physical Exam  Vitals and nursing note reviewed.   Constitutional:       General: She is not in acute distress.     Appearance: Normal appearance.   HENT:      Head: Normocephalic and atraumatic.      Right Ear: External ear normal.      Left Ear: Tympanic membrane, ear canal and external ear normal.      Ears:      Comments: Erythema of right canal and TM with dullness     Nose: Nose normal.      Mouth/Throat:      Mouth: Mucous membranes are moist.   Eyes:      Conjunctiva/sclera: Conjunctivae normal.   Cardiovascular:      Rate and Rhythm: Normal rate and regular rhythm.      Pulses: Normal pulses.      " Heart sounds: Normal heart sounds. No murmur heard.   No friction rub. No gallop.    Pulmonary:      Effort: Pulmonary effort is normal. No respiratory distress.      Breath sounds: No wheezing, rhonchi or rales.   Abdominal:      General: Bowel sounds are normal.      Palpations: Abdomen is soft.      Tenderness: There is no abdominal tenderness.   Musculoskeletal:      Cervical back: Neck supple.   Skin:     General: Skin is warm and dry.   Neurological:      General: No focal deficit present.      Mental Status: She is alert and oriented to person, place, and time.   Psychiatric:         Mood and Affect: Mood normal.         Behavior: Behavior normal.        Result Review :     CMP    CMP 11/17/20 3/25/21 7/27/21   Glucose   100 (A)   Glucose 108 (A) 94    BUN 9 9 11   Creatinine 0.61 0.59 0.82   eGFR Non African Am   80   eGFR African Am   97   Sodium 139 140 138   Potassium 3.7 4.3 4.2   Chloride 105 105 105   Calcium 9.4 9.0 9.2   Albumin 4.4 4.1 4.40   Total Bilirubin <0.15 (A) 0.21 0.2   Alkaline Phosphatase 59 48 48   AST (SGOT) 45 89 (A) 71 (A)   ALT (SGPT) 65 (A) 97 (A) 57 (A)   (A) Abnormal value            CBC    CBC 11/17/20 12/28/20 3/25/21   WBC 11.09 (A) 11.61 (A) 9.59   RBC 4.64 4.57 4.38   Hemoglobin 14.3 14.1 13.2   Hematocrit 43.5 44.4 41.1   MCV 93.8 97.2 93.8   MCH 30.8 30.9 30.1   MCHC 32.9 (A) 31.8 (A) 32.1 (A)   RDW 11.9 12.1 11.8   Platelets 294 301 289   (A) Abnormal value            Data reviewed: Radiologic studies mammo and Consultant notes Dr. Escudero   Procedures      Assessment and Plan    Diagnoses and all orders for this visit:    1. Insomnia, unspecified type (Primary)  Assessment & Plan:  Well-controlled at this time.  Continue trazodone as needed      2. Asthma, unspecified asthma severity, unspecified whether complicated, unspecified whether persistent  Assessment & Plan:  Well controlled        3. Allergic rhinitis, unspecified seasonality, unspecified trigger  Assessment &  Plan:  Recommended restarting Flonase      4. Migraine without status migrainosus, not intractable, unspecified migraine type    5. Acute non-recurrent sinusitis of other sinus  Assessment & Plan:  Continue doxy until complete      Other orders  -     fluticasone (Flonase) 50 MCG/ACT nasal spray; 2 sprays into the nostril(s) as directed by provider Daily.  Dispense: 11.1 mL; Refill: 5      19 to 39: Counseling/Anticipatory Guidance Discussed: nutrition, family planning/contraception, physical activity, healthy weight, injury prevention, dental health, immunizations and breast cancer and self breast exams      Follow Up   Return in about 1 year (around 9/28/2022), or if symptoms worsen or fail to improve.  Patient was given instructions and counseling regarding her condition or for health maintenance advice. Please see specific information pulled into the AVS if appropriate.

## 2021-10-05 DIAGNOSIS — G47.00 INSOMNIA, UNSPECIFIED TYPE: ICD-10-CM

## 2021-10-05 RX ORDER — TRAZODONE HYDROCHLORIDE 50 MG/1
TABLET ORAL
Qty: 30 TABLET | Refills: 1 | Status: SHIPPED | OUTPATIENT
Start: 2021-10-05 | End: 2021-12-17

## 2021-10-06 ENCOUNTER — TELEPHONE (OUTPATIENT)
Dept: OBSTETRICS AND GYNECOLOGY | Facility: CLINIC | Age: 36
End: 2021-10-06

## 2021-10-06 DIAGNOSIS — R92.8 ABNORMAL MAMMOGRAM: Primary | ICD-10-CM

## 2021-10-06 NOTE — TELEPHONE ENCOUNTER
Fabi at scheduling called stating the patient had abnormal mammogram on 9/20. An order was placed for a breast ultrasound but patient also needs a diagnostic mammogram. Please sign the attached order so the appointment can be corrected.

## 2021-10-08 ENCOUNTER — APPOINTMENT (OUTPATIENT)
Dept: ULTRASOUND IMAGING | Facility: HOSPITAL | Age: 36
End: 2021-10-08

## 2021-10-14 RX ORDER — ETONOGESTREL AND ETHINYL ESTRADIOL 11.7; 2.7 MG/1; MG/1
INSERT, EXTENDED RELEASE VAGINAL
Qty: 1 EACH | Refills: 2 | Status: SHIPPED | OUTPATIENT
Start: 2021-10-14 | End: 2021-11-08 | Stop reason: SDUPTHER

## 2021-10-18 ENCOUNTER — HOSPITAL ENCOUNTER (OUTPATIENT)
Dept: MAMMOGRAPHY | Facility: HOSPITAL | Age: 36
Discharge: HOME | End: 2021-10-18

## 2021-10-18 ENCOUNTER — HOSPITAL ENCOUNTER (OUTPATIENT)
Dept: ULTRASOUND IMAGING | Facility: HOSPITAL | Age: 36
Discharge: HOME | End: 2021-10-18

## 2021-10-18 DIAGNOSIS — R92.8 ABNORMAL MAMMOGRAM: ICD-10-CM

## 2021-10-18 PROCEDURE — 77065 DX MAMMO INCL CAD UNI: CPT

## 2021-10-18 PROCEDURE — G0279 TOMOSYNTHESIS, MAMMO: HCPCS

## 2021-11-01 DIAGNOSIS — Z13.29 SCREENING FOR THYROID DISORDER: ICD-10-CM

## 2021-11-01 DIAGNOSIS — R79.89 ABNORMAL LIVER FUNCTION TEST: Primary | ICD-10-CM

## 2021-11-01 DIAGNOSIS — Z13.220 SCREENING FOR LIPID DISORDERS: ICD-10-CM

## 2021-11-05 ENCOUNTER — CLINICAL SUPPORT (OUTPATIENT)
Dept: INTERNAL MEDICINE | Facility: CLINIC | Age: 36
End: 2021-11-05

## 2021-11-05 DIAGNOSIS — Z13.220 SCREENING FOR LIPID DISORDERS: ICD-10-CM

## 2021-11-05 DIAGNOSIS — Z13.29 SCREENING FOR THYROID DISORDER: ICD-10-CM

## 2021-11-05 DIAGNOSIS — R79.89 ABNORMAL LIVER FUNCTION TEST: ICD-10-CM

## 2021-11-05 LAB
ALBUMIN SERPL-MCNC: 4.3 G/DL (ref 3.5–5.2)
ALBUMIN/GLOB SERPL: 1.3 G/DL
ALP SERPL-CCNC: 55 U/L (ref 39–117)
ALT SERPL W P-5'-P-CCNC: 51 U/L (ref 1–33)
ANION GAP SERPL CALCULATED.3IONS-SCNC: 10.2 MMOL/L (ref 5–15)
AST SERPL-CCNC: 59 U/L (ref 1–32)
BASOPHILS # BLD AUTO: 0.1 10*3/MM3 (ref 0–0.2)
BASOPHILS NFR BLD AUTO: 1.2 % (ref 0–1.5)
BILIRUB SERPL-MCNC: <0.2 MG/DL (ref 0–1.2)
BUN SERPL-MCNC: 13 MG/DL (ref 6–20)
BUN/CREAT SERPL: 23.6 (ref 7–25)
CALCIUM SPEC-SCNC: 9.2 MG/DL (ref 8.6–10.5)
CHLORIDE SERPL-SCNC: 102 MMOL/L (ref 98–107)
CHOLEST SERPL-MCNC: 211 MG/DL (ref 0–200)
CO2 SERPL-SCNC: 23.8 MMOL/L (ref 22–29)
CREAT SERPL-MCNC: 0.55 MG/DL (ref 0.57–1)
DEPRECATED RDW RBC AUTO: 38.2 FL (ref 37–54)
EOSINOPHIL # BLD AUTO: 0.44 10*3/MM3 (ref 0–0.4)
EOSINOPHIL NFR BLD AUTO: 5.1 % (ref 0.3–6.2)
ERYTHROCYTE [DISTWIDTH] IN BLOOD BY AUTOMATED COUNT: 11.7 % (ref 12.3–15.4)
GFR SERPL CREATININE-BSD FRML MDRD: 126 ML/MIN/1.73
GFR SERPL CREATININE-BSD FRML MDRD: >150 ML/MIN/1.73
GLOBULIN UR ELPH-MCNC: 3.4 GM/DL
GLUCOSE SERPL-MCNC: 121 MG/DL (ref 65–99)
HCT VFR BLD AUTO: 39.2 % (ref 34–46.6)
HDLC SERPL-MCNC: 39 MG/DL (ref 40–60)
HGB BLD-MCNC: 13.3 G/DL (ref 12–15.9)
IMM GRANULOCYTES # BLD AUTO: 0.02 10*3/MM3 (ref 0–0.05)
IMM GRANULOCYTES NFR BLD AUTO: 0.2 % (ref 0–0.5)
LDLC SERPL CALC-MCNC: 100 MG/DL (ref 0–100)
LDLC/HDLC SERPL: 2.2 {RATIO}
LYMPHOCYTES # BLD AUTO: 2.9 10*3/MM3 (ref 0.7–3.1)
LYMPHOCYTES NFR BLD AUTO: 33.7 % (ref 19.6–45.3)
MCH RBC QN AUTO: 30.6 PG (ref 26.6–33)
MCHC RBC AUTO-ENTMCNC: 33.9 G/DL (ref 31.5–35.7)
MCV RBC AUTO: 90.3 FL (ref 79–97)
MONOCYTES # BLD AUTO: 0.56 10*3/MM3 (ref 0.1–0.9)
MONOCYTES NFR BLD AUTO: 6.5 % (ref 5–12)
NEUTROPHILS NFR BLD AUTO: 4.59 10*3/MM3 (ref 1.7–7)
NEUTROPHILS NFR BLD AUTO: 53.3 % (ref 42.7–76)
NRBC BLD AUTO-RTO: 0 /100 WBC (ref 0–0.2)
PLATELET # BLD AUTO: 289 10*3/MM3 (ref 140–450)
PMV BLD AUTO: 10.2 FL (ref 6–12)
POTASSIUM SERPL-SCNC: 4.1 MMOL/L (ref 3.5–5.2)
PROT SERPL-MCNC: 7.7 G/DL (ref 6–8.5)
RBC # BLD AUTO: 4.34 10*6/MM3 (ref 3.77–5.28)
SODIUM SERPL-SCNC: 136 MMOL/L (ref 136–145)
TRIGL SERPL-MCNC: 431 MG/DL (ref 0–150)
TSH SERPL DL<=0.05 MIU/L-ACNC: 2.5 UIU/ML (ref 0.27–4.2)
VLDLC SERPL-MCNC: 72 MG/DL (ref 5–40)
WBC # BLD AUTO: 8.61 10*3/MM3 (ref 3.4–10.8)

## 2021-11-05 PROCEDURE — 85025 COMPLETE CBC W/AUTO DIFF WBC: CPT | Performed by: NURSE PRACTITIONER

## 2021-11-05 PROCEDURE — 80053 COMPREHEN METABOLIC PANEL: CPT | Performed by: NURSE PRACTITIONER

## 2021-11-05 PROCEDURE — 36415 COLL VENOUS BLD VENIPUNCTURE: CPT | Performed by: NURSE PRACTITIONER

## 2021-11-05 PROCEDURE — 80061 LIPID PANEL: CPT | Performed by: NURSE PRACTITIONER

## 2021-11-05 PROCEDURE — 84443 ASSAY THYROID STIM HORMONE: CPT | Performed by: NURSE PRACTITIONER

## 2021-11-08 ENCOUNTER — OFFICE VISIT (OUTPATIENT)
Dept: OBSTETRICS AND GYNECOLOGY | Facility: CLINIC | Age: 36
End: 2021-11-08

## 2021-11-08 VITALS
SYSTOLIC BLOOD PRESSURE: 126 MMHG | WEIGHT: 173 LBS | HEIGHT: 63 IN | DIASTOLIC BLOOD PRESSURE: 79 MMHG | BODY MASS INDEX: 30.65 KG/M2 | HEART RATE: 101 BPM

## 2021-11-08 DIAGNOSIS — C50.919 FAMILIAL CANCER OF BREAST, UNSPECIFIED LATERALITY (HCC): ICD-10-CM

## 2021-11-08 DIAGNOSIS — Z30.44 ENCOUNTER FOR SURVEILLANCE OF VAGINAL RING HORMONAL CONTRACEPTIVE DEVICE: ICD-10-CM

## 2021-11-08 DIAGNOSIS — Z01.419 WOMEN'S ANNUAL ROUTINE GYNECOLOGICAL EXAMINATION: Primary | ICD-10-CM

## 2021-11-08 PROCEDURE — G0123 SCREEN CERV/VAG THIN LAYER: HCPCS | Performed by: OBSTETRICS & GYNECOLOGY

## 2021-11-08 PROCEDURE — 99395 PREV VISIT EST AGE 18-39: CPT | Performed by: OBSTETRICS & GYNECOLOGY

## 2021-11-08 RX ORDER — ETONOGESTREL AND ETHINYL ESTRADIOL 11.7; 2.7 MG/1; MG/1
INSERT, EXTENDED RELEASE VAGINAL
Qty: 1 EACH | Refills: 11 | Status: SHIPPED | OUTPATIENT
Start: 2021-11-08 | End: 2022-11-30 | Stop reason: SDUPTHER

## 2021-11-08 NOTE — PROGRESS NOTES
Well Woman Visit    CC: Scheduled annual well gyn visit  Chief Complaint   Patient presents with   • Gynecologic Exam     yearly pap       Myriad intake in the past?: No    NOT DONE TODAY due to: declined  Tobacco/Nicotine use:  No    Contraception or HRT: NuvaRing

## 2021-11-08 NOTE — PROGRESS NOTES
"Well Woman Visit    CC: KALLIE     Briana intake: Yes  DONE TODAY   Tobacco/Nicotine use:  No    HPI:   36 y.o. Contraception or HRT: NuvaRing  Menses:   q 0 days, lasts 0 days, changes products q 0 hrs on heaviest days.   Pain:  Mild, OTC meds control discomfort    Patient states she uses her NuvaRing continuously and skips cycles.  In October she had some menstrual cramps which was resolved when she has not had any since then      History: PMHx, Meds, Allergies, PSHx, Social Hx, and POBHx all reviewed and updated.  PCP: does manage PMHx and preventative labs    Review of Systems   Genitourinary: Positive for pelvic pain.   All other systems reviewed and are negative.       /79   Pulse 101   Ht 160 cm (63\")   Wt 78.5 kg (173 lb)   BMI 30.65 kg/m²     Physical Exam  Vitals and nursing note reviewed. Exam conducted with a chaperone present.   Constitutional:       Appearance: Normal appearance.   Neck:      Thyroid: No thyroid mass or thyromegaly.   Cardiovascular:      Rate and Rhythm: Normal rate and regular rhythm.      Heart sounds: Normal heart sounds.   Pulmonary:      Effort: Pulmonary effort is normal.      Breath sounds: Normal breath sounds.   Chest:   Breasts:      Right: Normal. No mass, nipple discharge or skin change.      Left: Normal. No mass, nipple discharge or skin change.       Abdominal:      General: Abdomen is flat. Bowel sounds are normal.      Palpations: Abdomen is soft.   Genitourinary:     General: Normal vulva.      Exam position: Lithotomy position.      Labia:         Right: No lesion.         Left: No lesion.       Urethra: No prolapse or urethral lesion.      Vagina: Normal.      Cervix: Normal.      Uterus: Normal.       Adnexa: Right adnexa normal and left adnexa normal.      Rectum: No external hemorrhoid.   Musculoskeletal:      Cervical back: Full passive range of motion without pain.   Neurological:      Mental Status: She is alert.         ASSESSMENT AND PLAN:  " WWE    Diagnoses and all orders for this visit:    1. Women's annual routine gynecological examination (Primary)  -     IGP,rfx Aptima HPV All Pth    2. Encounter for surveillance of vaginal ring hormonal contraceptive device  -     etonogestrel-ethinyl estradiol (EluRyng) 0.12-0.015 MG/24HR vaginal ring; INSERT 1 RING VAGINALLY ON 1ST, AND REMOVE RING ON 31ST  Dispense: 1 each; Refill: 11    3. Familial cancer of breast, unspecified laterality (HCC)  Overview:  Qualifies for [facility] testing  Will draw today and f/u 4 weeks to review    Orders:  -     [employer] Hereditary Cancer Screen; Future  -     [employer] Hereditary Cancer Screen      Counseling:     All BC Options R/B/A/SE/E of each reviewed  OCP/Hormone use risk YA    Preventative:   Follow up PCP/Specialist PMHx and Labs  [facility]: Qualifies for testing.  s/p COVID vaccine        She understands the importance of having any ordered tests to be performed in a timely fashion.  The risks of not performing them include, but are not limited to, advanced cancer stages, bone loss from osteoporosis and/or subsequent increase in morbidity and/or mortality.  She is encouraged to review her results online and/or contact or office if she has questions.     Follow Up:  Return in about 4 weeks (around 12/6/2021) for [facility] results.        Emily Escudero MD  11/08/2021

## 2021-11-11 LAB
CONV .: NORMAL
CYTOLOGIST CVX/VAG CYTO: NORMAL
CYTOLOGY CVX/VAG DOC CYTO: NORMAL
CYTOLOGY CVX/VAG DOC THIN PREP: NORMAL
DX ICD CODE: NORMAL
HIV 1 & 2 AB SER-IMP: NORMAL
OTHER STN SPEC: NORMAL
STAT OF ADQ CVX/VAG CYTO-IMP: NORMAL

## 2021-12-14 ENCOUNTER — OFFICE VISIT (OUTPATIENT)
Dept: OBSTETRICS AND GYNECOLOGY | Facility: CLINIC | Age: 36
End: 2021-12-14

## 2021-12-14 VITALS
DIASTOLIC BLOOD PRESSURE: 74 MMHG | BODY MASS INDEX: 31 KG/M2 | SYSTOLIC BLOOD PRESSURE: 109 MMHG | WEIGHT: 175 LBS | HEART RATE: 89 BPM

## 2021-12-14 DIAGNOSIS — Z91.89 INCREASED RISK OF BREAST CANCER: Primary | ICD-10-CM

## 2021-12-14 PROCEDURE — 99213 OFFICE O/P EST LOW 20 MIN: CPT | Performed by: OBSTETRICS & GYNECOLOGY

## 2021-12-14 NOTE — PROGRESS NOTES
GYN Visit    CC: Follow-up myriad testing    HPI:   36 y.o. Pt has no complaints today.          History: PMHx, Meds, Allergies, PSHx, Social Hx, and POBHx all reviewed and updated.    PHYSICAL EXAM:  /74   Pulse 89   Wt 79.4 kg (175 lb)   BMI 31.00 kg/m²   General- NAD, alert and oriented, appropriate  Psych- Normal mood, good memory    I have reviewed the patient's myriad testing personally and evaluated with  the patient herself all questions have been answered      ASSESSMENT AND PLAN:  Diagnoses and all orders for this visit:    1. Increased risk of breast cancer (Primary)  Overview:  Personal risk of breast cancer is 22.8%  Genetic testing is negative  Youngest affected family member diagnosed at 44  Last mammogram 2021  Will schedule bilateral breast MRI for 3/2022    Orders:  -     MRI Breast Bilateral With & Without Contrast; Future      Counseling counseled regarding increased risk of breast cancer.  Recommend patient do monthly self breast exams and mammograms once a year as well as bilateral screening mammograms once a year        Follow Up:  Return for Annual physical.          Emily Escudero MD  2021

## 2021-12-17 DIAGNOSIS — G47.00 INSOMNIA, UNSPECIFIED TYPE: ICD-10-CM

## 2021-12-17 RX ORDER — TRAZODONE HYDROCHLORIDE 50 MG/1
TABLET ORAL
Qty: 30 TABLET | Refills: 1 | Status: SHIPPED | OUTPATIENT
Start: 2021-12-17 | End: 2022-04-26 | Stop reason: SDUPTHER

## 2022-01-10 ENCOUNTER — IMMUNIZATION (OUTPATIENT)
Dept: INTERNAL MEDICINE | Facility: CLINIC | Age: 37
End: 2022-01-10

## 2022-01-10 PROCEDURE — 0004A COVID-19 (PFIZER): CPT | Performed by: NURSE PRACTITIONER

## 2022-01-10 PROCEDURE — 91300 COVID-19 (PFIZER): CPT | Performed by: NURSE PRACTITIONER

## 2022-01-12 PROCEDURE — U0004 COV-19 TEST NON-CDC HGH THRU: HCPCS | Performed by: NURSE PRACTITIONER

## 2022-01-13 ENCOUNTER — TELEPHONE (OUTPATIENT)
Dept: URGENT CARE | Facility: CLINIC | Age: 37
End: 2022-01-13

## 2022-03-21 ENCOUNTER — APPOINTMENT (OUTPATIENT)
Dept: MRI IMAGING | Facility: HOSPITAL | Age: 37
End: 2022-03-21

## 2022-03-23 ENCOUNTER — HOSPITAL ENCOUNTER (OUTPATIENT)
Dept: MRI IMAGING | Facility: HOSPITAL | Age: 37
Discharge: HOME | End: 2022-03-23
Admitting: OBSTETRICS & GYNECOLOGY

## 2022-03-23 DIAGNOSIS — Z91.89 INCREASED RISK OF BREAST CANCER: ICD-10-CM

## 2022-03-23 PROCEDURE — A9577 INJ MULTIHANCE: HCPCS | Performed by: OBSTETRICS & GYNECOLOGY

## 2022-03-23 PROCEDURE — 77049 MRI BREAST C-+ W/CAD BI: CPT

## 2022-03-23 PROCEDURE — 0 GADOBENATE DIMEGLUMINE 529 MG/ML SOLUTION: Performed by: OBSTETRICS & GYNECOLOGY

## 2022-03-23 RX ADMIN — GADOBENATE DIMEGLUMINE 15 ML: 529 INJECTION, SOLUTION INTRAVENOUS at 12:33

## 2022-03-24 DIAGNOSIS — R92.8 ABNORMAL MRI, BREAST: Primary | ICD-10-CM

## 2022-04-14 ENCOUNTER — HOSPITAL ENCOUNTER (OUTPATIENT)
Dept: ULTRASOUND IMAGING | Facility: HOSPITAL | Age: 37
Discharge: HOME | End: 2022-04-14

## 2022-04-14 ENCOUNTER — HOSPITAL ENCOUNTER (OUTPATIENT)
Dept: MAMMOGRAPHY | Facility: HOSPITAL | Age: 37
Discharge: HOME | End: 2022-04-14

## 2022-04-14 DIAGNOSIS — R92.8 ABNORMAL MRI, BREAST: ICD-10-CM

## 2022-04-14 PROCEDURE — 76642 ULTRASOUND BREAST LIMITED: CPT

## 2022-04-14 PROCEDURE — 77066 DX MAMMO INCL CAD BI: CPT

## 2022-04-14 PROCEDURE — G0279 TOMOSYNTHESIS, MAMMO: HCPCS

## 2022-04-15 ENCOUNTER — OFFICE VISIT (OUTPATIENT)
Dept: INTERNAL MEDICINE | Facility: CLINIC | Age: 37
End: 2022-04-15

## 2022-04-15 ENCOUNTER — TELEPHONE (OUTPATIENT)
Dept: INTERNAL MEDICINE | Facility: CLINIC | Age: 37
End: 2022-04-15

## 2022-04-15 VITALS
DIASTOLIC BLOOD PRESSURE: 62 MMHG | TEMPERATURE: 96.5 F | SYSTOLIC BLOOD PRESSURE: 118 MMHG | BODY MASS INDEX: 30.87 KG/M2 | RESPIRATION RATE: 14 BRPM | WEIGHT: 174.2 LBS | OXYGEN SATURATION: 98 % | HEIGHT: 63 IN | HEART RATE: 87 BPM

## 2022-04-15 DIAGNOSIS — G47.00 INSOMNIA, UNSPECIFIED TYPE: ICD-10-CM

## 2022-04-15 DIAGNOSIS — H65.193 OTHER NON-RECURRENT ACUTE NONSUPPURATIVE OTITIS MEDIA OF BOTH EARS: Primary | ICD-10-CM

## 2022-04-15 PROCEDURE — 99213 OFFICE O/P EST LOW 20 MIN: CPT

## 2022-04-15 RX ORDER — DOXYCYCLINE HYCLATE 100 MG/1
100 CAPSULE ORAL 2 TIMES DAILY
Qty: 20 CAPSULE | Refills: 0 | Status: SHIPPED | OUTPATIENT
Start: 2022-04-15 | End: 2022-04-26

## 2022-04-15 RX ORDER — PREDNISONE 10 MG/1
TABLET ORAL
Qty: 48 TABLET | Refills: 0 | Status: SHIPPED | OUTPATIENT
Start: 2022-04-15 | End: 2022-06-10

## 2022-04-15 RX ORDER — TRIAMCINOLONE ACETONIDE 1 MG/G
CREAM TOPICAL
COMMUNITY
Start: 2022-03-22

## 2022-04-15 RX ORDER — PREDNISOLONE ACETATE 10 MG/ML
SUSPENSION/ DROPS OPHTHALMIC
COMMUNITY
Start: 2022-04-12 | End: 2022-06-10

## 2022-04-15 NOTE — TELEPHONE ENCOUNTER
"Red rule verified and correct.    Pt c/o pain to the R ear since last evening.   + swelling to R jaw line by ear with a \"knot\"    L ear is just starting to hurt.    She has a sore on her R eyelid, but is using steroid gtts in it.    Appt this morning.  "

## 2022-04-15 NOTE — PROGRESS NOTES
"Chief Complaint  Earache and Facial Swelling    Subjective          Minh Drake presents to [facility]  History of Present Illness    Minh is here for ear pain and a knot behind her jaw. Started with right eye drainage and swelling has steroids for that from eye doctor. She said the pain isn't too bad today but she has noticed more swelling - possibly her whole jaw. She denies taking any medications. She said this has happened before and she had to take steroids and antibiotics which helped it.     Objective   Vital Signs:   /62   Pulse 87   Temp 96.5 °F (35.8 °C) (Temporal)   Resp 14   Ht 160 cm (63\")   Wt 79 kg (174 lb 3.2 oz)   SpO2 98%   BMI 30.86 kg/m²     Physical Exam  Vitals reviewed.   Constitutional:       Appearance: Normal appearance. She is well-developed.   HENT:      Head: Normocephalic and atraumatic.      Jaw: Tenderness (Mild right sided tenderness on jaw line) and swelling (Small area of swelling to right posterior jaw) present.      Right Ear: External ear normal. Tympanic membrane is erythematous and bulging.      Left Ear: External ear normal. Tympanic membrane is erythematous.      Mouth/Throat:      Mouth: Mucous membranes are dry.      Pharynx: Oropharynx is clear. No oropharyngeal exudate or posterior oropharyngeal erythema.      Tonsils: No tonsillar exudate or tonsillar abscesses.   Eyes:      Conjunctiva/sclera: Conjunctivae normal.      Pupils: Pupils are equal, round, and reactive to light.   Cardiovascular:      Rate and Rhythm: Normal rate and regular rhythm.      Heart sounds: No murmur heard.    No friction rub. No gallop.   Pulmonary:      Effort: Pulmonary effort is normal.      Breath sounds: Normal breath sounds. No wheezing or rhonchi.   Skin:     General: Skin is warm and dry.   Neurological:      Mental Status: She is alert and oriented to person, place, and time.   Psychiatric:         Mood and Affect: " Affect normal.        Result Review :          Procedures      Assessment and Plan    Diagnoses and all orders for this visit:    1. Other non-recurrent acute nonsuppurative otitis media of both ears (Primary)  Comments:  Will treat with steroids and antibiotics. Patient to notify office if swelling is not improving.  Orders:  -     doxycycline (VIBRAMYCIN) 100 MG capsule; Take 1 capsule by mouth 2 (Two) Times a Day for 10 days.  Dispense: 20 capsule; Refill: 0  -     predniSONE (DELTASONE) 10 MG (48) dose pack; Take as described on package  Dispense: 48 tablet; Refill: 0    2. Insomnia, unspecified type  Comments:  Recommended taking Trazadone for sleep nightly, patient has used PRN. Patient could also try melatonin in the meantime   Orders:  -     Melatonin 5 MG capsule; Take 5 mg by mouth At Night As Needed (insomnia).  Dispense: 30 each; Refill: 3      I discussed with the patient that she does have an ear infection however Im concerned about progressing into mastoiditis based on swelling. Area is not reddened or inflamed so less concerned for that. Swelling could also be lymph related. Patient to contact office if symptoms do not improve after antibiotics. Patient to seek further evaluation over the weekend if redness, swelling or fever develop.     Patient was instructed and educated on their diagnoses and treatment plan prior to leaving the office. If symptoms worsen or persist, seek emergency medical attention. Take all medications as prescribed. Call the office if any questions or concerns arise.       Follow Up   Return if symptoms worsen or fail to improve.  Patient was given instructions and counseling regarding her condition or for health maintenance advice. Please see specific information pulled into the AVS if appropriate.

## 2022-04-22 ENCOUNTER — TELEPHONE (OUTPATIENT)
Dept: INTERNAL MEDICINE | Facility: CLINIC | Age: 37
End: 2022-04-22

## 2022-04-22 NOTE — TELEPHONE ENCOUNTER
Caller: Minh Drake    Relationship: Self    Best call back number: [phone]    What is the best time to reach you: ANY TIME     Who are you requesting to speak with (clinical staff, provider,  specific staff member): CLINICAL         What was the call regarding: PATIENT STATES THAT SHE IS HAVING LEFT ARM PAIN SINCE LAST WEEK. STATES THAT SHE WAS SEEN LAST Friday 04/15/2022 AND DID NOT MENTION IT TO THE PROVIDER. NOW STATES THAT THE PAIN HAS GOTTEN WORSE AND IS STILL CONCERNED WITH THE KNOTS IN HER NECK. PATIENT STATES THAT SHE HAS BEEN APPLYING ICE TO HER ARM EVERY NIGHT AND TAKING NAPROXEN SODIUM FOR THE PAIN AND DOES NOT SEEM TO BE HELPING MUCH     HUB ADVISED PATIENT TO SEEK EMERGENT CARE IF NEEDED FOR THE ARM PAIN.    HUB ATTEMPTED TO WARM TRANSFER TO THE CLINICAL STAFF AND NO ONE ANSWERED.     Do you require a callback: YES

## 2022-04-25 NOTE — TELEPHONE ENCOUNTER
"Red rule verified and correct.    Pt still c/o pain to her jawline with swelling. Recent Hx of OM analy. Placed on steriods and abx. on 4/15/22.  Finishes abx today    Occasional sweatiness.  Denies CP and nausea.    St muscle in neck is \"stiff\" and pain goes down to L arm.  Taking naproxen w/o full relief.    Advised to add tylenol and id s/s worsened or she had CP, SOB to go to the ED    Patient verbalized understanding.    Appt tomorrow.  .        "

## 2022-04-26 ENCOUNTER — OFFICE VISIT (OUTPATIENT)
Dept: INTERNAL MEDICINE | Facility: CLINIC | Age: 37
End: 2022-04-26

## 2022-04-26 VITALS
WEIGHT: 175.4 LBS | TEMPERATURE: 96.4 F | DIASTOLIC BLOOD PRESSURE: 78 MMHG | BODY MASS INDEX: 31.08 KG/M2 | HEIGHT: 63 IN | RESPIRATION RATE: 20 BRPM | OXYGEN SATURATION: 98 % | HEART RATE: 101 BPM | SYSTOLIC BLOOD PRESSURE: 128 MMHG

## 2022-04-26 DIAGNOSIS — M79.602 BILATERAL ARM PAIN: ICD-10-CM

## 2022-04-26 DIAGNOSIS — G47.00 INSOMNIA, UNSPECIFIED TYPE: Primary | Chronic | ICD-10-CM

## 2022-04-26 DIAGNOSIS — M79.601 BILATERAL ARM PAIN: ICD-10-CM

## 2022-04-26 DIAGNOSIS — F41.9 ANXIETY: ICD-10-CM

## 2022-04-26 DIAGNOSIS — R59.1 LYMPHADENOPATHY: ICD-10-CM

## 2022-04-26 PROCEDURE — 99214 OFFICE O/P EST MOD 30 MIN: CPT | Performed by: NURSE PRACTITIONER

## 2022-04-26 RX ORDER — TRAZODONE HYDROCHLORIDE 50 MG/1
50 TABLET ORAL NIGHTLY
Qty: 30 TABLET | Refills: 1 | Status: SHIPPED | OUTPATIENT
Start: 2022-04-26 | End: 2022-05-23

## 2022-04-26 NOTE — PROGRESS NOTES
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     She has also noticed knots in her neck recently  Has not been using flonase  Recent ear infection--tx with antibiotics and steroids 4/15    Currently has nuvaring and not having periods--sees Dr. Escudero  She reports a family hx of hyperthyroid    She is having pain in her left forearm pain, now having pain in the right  She reports improvement since the last visit  Taking naproxen and tylenol  She reports having similar pain years ago with playing piano and typing  She is going to the chiropractor which is helping    She is having a lot of trouble sleeping--melatonin and trazadone has helped  Objective   Vital Signs:   /78 (BP Location: Right arm, Patient Position: Sitting, Cuff Size: Adult)   Pulse 101   Temp 96.4 °F (35.8 °C)   Resp 20   Ht 160 cm (63\")   Wt 79.6 kg (175 lb 6.4 oz)   SpO2 98%   BMI 31.07 kg/m²     Physical Exam  Vitals and nursing note reviewed.   Constitutional:       General: She is not in acute distress.     Appearance: Normal appearance.   HENT:      Head: Normocephalic and atraumatic.      Right Ear: Tympanic membrane, ear canal and external ear normal.      Left Ear: Tympanic membrane, ear canal and external ear normal.      Nose: Rhinorrhea present.      Mouth/Throat:      Mouth: Mucous membranes are moist.      Pharynx: Posterior oropharyngeal erythema present.   Eyes:      Conjunctiva/sclera: Conjunctivae normal.   Cardiovascular:      Rate and Rhythm: Normal rate and regular rhythm.      Pulses: Normal pulses.      Heart sounds: Normal heart sounds. No murmur heard.    No friction rub. " No gallop.   Pulmonary:      Effort: Pulmonary effort is normal. No respiratory distress.      Breath sounds: No wheezing, rhonchi or rales.   Abdominal:      Tenderness: There is no abdominal tenderness.   Musculoskeletal:      Cervical back: Neck supple.      Right lower leg: No edema.      Left lower leg: No edema.   Lymphadenopathy:      Cervical: Cervical adenopathy (Mild anterior cervical bilaterally) present.   Skin:     General: Skin is warm and dry.   Neurological:      General: No focal deficit present.      Mental Status: She is alert and oriented to person, place, and time.   Psychiatric:         Mood and Affect: Mood normal.         Behavior: Behavior normal.        Result Review :          Procedures      Assessment and Plan    Diagnoses and all orders for this visit:    1. Insomnia, unspecified type (Primary)  Assessment & Plan:  Counseled on sleep hygiene.    Orders:  -     traZODone (DESYREL) 50 MG tablet; Take 1 tablet by mouth Every Night.  Dispense: 30 tablet; Refill: 1    2. Bilateral arm pain  Assessment & Plan:  She will continue with naproxen, finish steroids (ears), and decrease repetitive movements. Repeat eval in 3-4 wks. Discussed tendinopathy's.  Discussed options for further treatment including wrist splints versus occupational therapy.  She would like to hold off given that symptoms have improved since being on steroids and naproxen      3. Anxiety  Comments:  counseling handout, discussed healthy boundaries regarding work    4. Lymphadenopathy  Comments:  restarting flonase, zyrtec.  Will reevaluate at follow-up.  Likely secondary to recent ear infection and allergy symptoms            Follow Up   Return in about 4 weeks (around 5/24/2022) for Annual physical in 3-4 wks.  Patient was given instructions and counseling regarding her condition or for health maintenance advice. Please see specific information pulled into the AVS if appropriate.

## 2022-04-26 NOTE — ASSESSMENT & PLAN NOTE
She will continue with naproxen, finish steroids (ears), and decrease repetitive movements. Repeat eval in 3-4 wks. Discussed tendinopathy's.  Discussed options for further treatment including wrist splints versus occupational therapy.  She would like to hold off given that symptoms have improved since being on steroids and naproxen

## 2022-05-05 ENCOUNTER — HOSPITAL ENCOUNTER (OUTPATIENT)
Dept: CARDIOLOGY | Facility: HOSPITAL | Age: 37
Discharge: HOME | End: 2022-05-05
Admitting: NURSE PRACTITIONER

## 2022-05-05 ENCOUNTER — OFFICE VISIT (OUTPATIENT)
Dept: INTERNAL MEDICINE | Facility: CLINIC | Age: 37
End: 2022-05-05

## 2022-05-05 VITALS
OXYGEN SATURATION: 98 % | HEIGHT: 63 IN | BODY MASS INDEX: 31.36 KG/M2 | RESPIRATION RATE: 18 BRPM | HEART RATE: 111 BPM | TEMPERATURE: 97.5 F | DIASTOLIC BLOOD PRESSURE: 68 MMHG | SYSTOLIC BLOOD PRESSURE: 118 MMHG | WEIGHT: 177 LBS

## 2022-05-05 DIAGNOSIS — M79.89 LEFT ARM SWELLING: ICD-10-CM

## 2022-05-05 DIAGNOSIS — M79.602 LEFT ARM PAIN: ICD-10-CM

## 2022-05-05 DIAGNOSIS — M79.10 MUSCLE PAIN: ICD-10-CM

## 2022-05-05 DIAGNOSIS — H92.03 OTALGIA OF BOTH EARS: ICD-10-CM

## 2022-05-05 DIAGNOSIS — R60.0 PEDAL EDEMA: ICD-10-CM

## 2022-05-05 DIAGNOSIS — R11.10 VOMITING, UNSPECIFIED VOMITING TYPE, UNSPECIFIED WHETHER NAUSEA PRESENT: Primary | ICD-10-CM

## 2022-05-05 LAB
25(OH)D3 SERPL-MCNC: 18.3 NG/ML (ref 30–100)
ALBUMIN SERPL-MCNC: 3.8 G/DL (ref 3.5–5.2)
ALBUMIN/GLOB SERPL: 1.2 G/DL
ALP SERPL-CCNC: 43 U/L (ref 39–117)
ALT SERPL W P-5'-P-CCNC: 66 U/L (ref 1–33)
ANION GAP SERPL CALCULATED.3IONS-SCNC: 12.3 MMOL/L (ref 5–15)
AST SERPL-CCNC: 62 U/L (ref 1–32)
BASOPHILS # BLD AUTO: 0.03 10*3/MM3 (ref 0–0.2)
BASOPHILS NFR BLD AUTO: 0.4 % (ref 0–1.5)
BH CV UPPER VENOUS LEFT AXILLARY AUGMENT: NORMAL
BH CV UPPER VENOUS LEFT AXILLARY COMPRESS: NORMAL
BH CV UPPER VENOUS LEFT AXILLARY PHASIC: NORMAL
BH CV UPPER VENOUS LEFT AXILLARY SPONT: NORMAL
BH CV UPPER VENOUS LEFT BASILIC FOREARM COMPRESS: NORMAL
BH CV UPPER VENOUS LEFT BASILIC UPPER COMPRESS: NORMAL
BH CV UPPER VENOUS LEFT BRACHIAL COMPRESS: NORMAL
BH CV UPPER VENOUS LEFT CEPHALIC FOREARM COMPRESS: NORMAL
BH CV UPPER VENOUS LEFT CEPHALIC UPPER COMPRESS: NORMAL
BH CV UPPER VENOUS LEFT INTERNAL JUGULAR AUGMENT: NORMAL
BH CV UPPER VENOUS LEFT INTERNAL JUGULAR COMPRESS: NORMAL
BH CV UPPER VENOUS LEFT INTERNAL JUGULAR PHASIC: NORMAL
BH CV UPPER VENOUS LEFT INTERNAL JUGULAR SPONT: NORMAL
BH CV UPPER VENOUS LEFT RADIAL COMPRESS: NORMAL
BH CV UPPER VENOUS LEFT SUBCLAVIAN AUGMENT: NORMAL
BH CV UPPER VENOUS LEFT SUBCLAVIAN COMPRESS: NORMAL
BH CV UPPER VENOUS LEFT SUBCLAVIAN PHASIC: NORMAL
BH CV UPPER VENOUS LEFT SUBCLAVIAN SPONT: NORMAL
BH CV UPPER VENOUS LEFT ULNAR COMPRESS: NORMAL
BH CV UPPER VENOUS RIGHT INTERNAL JUGULAR AUGMENT: NORMAL
BH CV UPPER VENOUS RIGHT INTERNAL JUGULAR COMPRESS: NORMAL
BH CV UPPER VENOUS RIGHT INTERNAL JUGULAR PHASIC: NORMAL
BH CV UPPER VENOUS RIGHT INTERNAL JUGULAR SPONT: NORMAL
BH CV UPPER VENOUS RIGHT SUBCLAVIAN AUGMENT: NORMAL
BH CV UPPER VENOUS RIGHT SUBCLAVIAN COMPRESS: NORMAL
BH CV UPPER VENOUS RIGHT SUBCLAVIAN PHASIC: NORMAL
BH CV UPPER VENOUS RIGHT SUBCLAVIAN SPONT: NORMAL
BILIRUB SERPL-MCNC: 0.2 MG/DL (ref 0–1.2)
BUN SERPL-MCNC: 10 MG/DL (ref 6–20)
BUN/CREAT SERPL: 16.1 (ref 7–25)
CALCIUM SPEC-SCNC: 9.4 MG/DL (ref 8.6–10.5)
CHLORIDE SERPL-SCNC: 106 MMOL/L (ref 98–107)
CK SERPL-CCNC: 51 U/L (ref 20–180)
CO2 SERPL-SCNC: 20.7 MMOL/L (ref 22–29)
CREAT SERPL-MCNC: 0.62 MG/DL (ref 0.57–1)
CRP SERPL-MCNC: 1.41 MG/DL (ref 0–0.5)
DEPRECATED RDW RBC AUTO: 40.3 FL (ref 37–54)
EGFRCR SERPLBLD CKD-EPI 2021: 119.3 ML/MIN/1.73
EOSINOPHIL # BLD AUTO: 0.28 10*3/MM3 (ref 0–0.4)
EOSINOPHIL NFR BLD AUTO: 3.3 % (ref 0.3–6.2)
ERYTHROCYTE [DISTWIDTH] IN BLOOD BY AUTOMATED COUNT: 12.1 % (ref 12.3–15.4)
ERYTHROCYTE [SEDIMENTATION RATE] IN BLOOD: 19 MM/HR (ref 0–20)
EXPIRATION DATE: NORMAL
FLUAV AG UPPER RESP QL IA.RAPID: NOT DETECTED
FLUBV AG UPPER RESP QL IA.RAPID: NOT DETECTED
GLOBULIN UR ELPH-MCNC: 3.1 GM/DL
GLUCOSE SERPL-MCNC: 134 MG/DL (ref 65–99)
HCT VFR BLD AUTO: 39.3 % (ref 34–46.6)
HGB BLD-MCNC: 13.2 G/DL (ref 12–15.9)
IMM GRANULOCYTES # BLD AUTO: 0.03 10*3/MM3 (ref 0–0.05)
IMM GRANULOCYTES NFR BLD AUTO: 0.4 % (ref 0–0.5)
INTERNAL CONTROL: NORMAL
IRON 24H UR-MRATE: 114 MCG/DL (ref 37–145)
IRON SATN MFR SERPL: 22 % (ref 20–50)
LYMPHOCYTES # BLD AUTO: 2.4 10*3/MM3 (ref 0.7–3.1)
LYMPHOCYTES NFR BLD AUTO: 28.2 % (ref 19.6–45.3)
Lab: NORMAL
MAGNESIUM SERPL-MCNC: 2.1 MG/DL (ref 1.6–2.6)
MAXIMAL PREDICTED HEART RATE: 185 BPM
MCH RBC QN AUTO: 31.1 PG (ref 26.6–33)
MCHC RBC AUTO-ENTMCNC: 33.6 G/DL (ref 31.5–35.7)
MCV RBC AUTO: 92.7 FL (ref 79–97)
MONOCYTES # BLD AUTO: 0.64 10*3/MM3 (ref 0.1–0.9)
MONOCYTES NFR BLD AUTO: 7.5 % (ref 5–12)
NEUTROPHILS NFR BLD AUTO: 5.13 10*3/MM3 (ref 1.7–7)
NEUTROPHILS NFR BLD AUTO: 60.2 % (ref 42.7–76)
NRBC BLD AUTO-RTO: 0 /100 WBC (ref 0–0.2)
PLATELET # BLD AUTO: 209 10*3/MM3 (ref 140–450)
PMV BLD AUTO: 10.3 FL (ref 6–12)
POTASSIUM SERPL-SCNC: 4.4 MMOL/L (ref 3.5–5.2)
PROT SERPL-MCNC: 6.9 G/DL (ref 6–8.5)
RBC # BLD AUTO: 4.24 10*6/MM3 (ref 3.77–5.28)
SARS-COV-2 AG UPPER RESP QL IA.RAPID: NOT DETECTED
SODIUM SERPL-SCNC: 139 MMOL/L (ref 136–145)
STRESS TARGET HR: 157 BPM
TIBC SERPL-MCNC: 517 MCG/DL (ref 298–536)
TRANSFERRIN SERPL-MCNC: 347 MG/DL (ref 200–360)
TSH SERPL DL<=0.05 MIU/L-ACNC: 1.94 UIU/ML (ref 0.27–4.2)
VIT B12 BLD-MCNC: 420 PG/ML (ref 211–946)
WBC NRBC COR # BLD: 8.51 10*3/MM3 (ref 3.4–10.8)

## 2022-05-05 PROCEDURE — 99214 OFFICE O/P EST MOD 30 MIN: CPT | Performed by: NURSE PRACTITIONER

## 2022-05-05 PROCEDURE — 93971 EXTREMITY STUDY: CPT | Performed by: SURGERY

## 2022-05-05 PROCEDURE — 85652 RBC SED RATE AUTOMATED: CPT | Performed by: NURSE PRACTITIONER

## 2022-05-05 PROCEDURE — 86140 C-REACTIVE PROTEIN: CPT | Performed by: NURSE PRACTITIONER

## 2022-05-05 PROCEDURE — 87428 SARSCOV & INF VIR A&B AG IA: CPT | Performed by: NURSE PRACTITIONER

## 2022-05-05 PROCEDURE — 82607 VITAMIN B-12: CPT | Performed by: NURSE PRACTITIONER

## 2022-05-05 PROCEDURE — 82306 VITAMIN D 25 HYDROXY: CPT | Performed by: NURSE PRACTITIONER

## 2022-05-05 PROCEDURE — 84466 ASSAY OF TRANSFERRIN: CPT | Performed by: NURSE PRACTITIONER

## 2022-05-05 PROCEDURE — 83735 ASSAY OF MAGNESIUM: CPT | Performed by: NURSE PRACTITIONER

## 2022-05-05 PROCEDURE — 80050 GENERAL HEALTH PANEL: CPT | Performed by: NURSE PRACTITIONER

## 2022-05-05 PROCEDURE — 82550 ASSAY OF CK (CPK): CPT | Performed by: NURSE PRACTITIONER

## 2022-05-05 PROCEDURE — 83540 ASSAY OF IRON: CPT | Performed by: NURSE PRACTITIONER

## 2022-05-05 PROCEDURE — 93971 EXTREMITY STUDY: CPT

## 2022-05-05 NOTE — PROGRESS NOTES
"Chief Complaint  Diarrhea, Vomiting, Earache (Left ear pain- Last night), and Muscle Pain (Left arm and left leg)    Subjective          Minh Drake presents to [facility]  History of Present Illness  Vomiting and diarrhea started about 3 days ago.   Denies abdominal pain  Started on augmentin about 6 days ago for right otitis media. Was given this by her brother in law for 5 days, finished this am  Now left ear is hurting    Ongoing pain in left arm, worse, pain at rest and with touch  Have been seeing a chiropractor who commented on swelling  He also experienced left ankle pain after being seen by the chiropractor.  She reports taking a hot bath yesterday which made the ankle pain worse.  She reports swelling at that time.  She states that ice and elevating it improved the pain and swelling.  She denies any issues with left ankle today.  sHe also denies injury     Denies soa, chest pain  Reports episode of diaphoresis with vomiting yesterday, so episodes since  Denies fever  Objective   Vital Signs:   /68 (BP Location: Right arm, Patient Position: Sitting, Cuff Size: Adult)   Pulse 111   Temp 97.5 °F (36.4 °C)   Resp 18   Ht 160 cm (63\")   Wt 80.3 kg (177 lb)   SpO2 98%   BMI 31.35 kg/m²     Physical Exam  Vitals and nursing note reviewed.   Constitutional:       General: She is not in acute distress.     Appearance: Normal appearance.   HENT:      Head: Normocephalic and atraumatic.      Right Ear: Tympanic membrane, ear canal and external ear normal.      Left Ear: Tympanic membrane, ear canal and external ear normal.      Nose: Nose normal.      Mouth/Throat:      Mouth: Mucous membranes are moist.   Eyes:      Conjunctiva/sclera: Conjunctivae normal.   Cardiovascular:      Rate and Rhythm: Normal rate and regular rhythm.      Pulses: Normal pulses.      Heart sounds: Normal heart sounds. No murmur heard.    No friction rub. No gallop. "   Pulmonary:      Effort: Pulmonary effort is normal. No respiratory distress.      Breath sounds: No wheezing, rhonchi or rales.   Abdominal:      Tenderness: There is no abdominal tenderness.   Musculoskeletal:      Cervical back: Neck supple.      Right lower leg: Edema (trace) present.      Left lower leg: Edema (trace) present.      Comments: Trace edema of left upper and forearm. Normal ROM. No erythema. 2+ pulses   Skin:     General: Skin is warm and dry.   Neurological:      General: No focal deficit present.      Mental Status: She is alert and oriented to person, place, and time.   Psychiatric:         Mood and Affect: Mood normal.         Behavior: Behavior normal.        Result Review :          Procedures      Assessment and Plan    Diagnoses and all orders for this visit:    1. Vomiting, unspecified vomiting type, unspecified whether nausea present (Primary)  Comments:  Discussed that this may be related to Augmentin.  Currently resolved.  Orders:  -     POCT SARS-CoV-2 Antigen SERGE + Flu  -     Cancel: US Venous Doppler Upper Extremity Left (duplex)  -     CBC Auto Differential  -     Comprehensive Metabolic Panel  -     TSH  -     Iron Profile  -     Vitamin B12  -     Vitamin D 25 Hydroxy  -     CK  -     C-reactive Protein  -     Sedimentation Rate  -     Magnesium    2. Left arm pain  Comments:  We will get venous Doppler to rule out DVT.  Follow-up next week  Orders:  -     Cancel: US Venous Doppler Upper Extremity Left (duplex)  -     CBC Auto Differential  -     Comprehensive Metabolic Panel  -     TSH  -     Iron Profile  -     Vitamin B12  -     Vitamin D 25 Hydroxy  -     CK  -     C-reactive Protein  -     Sedimentation Rate  -     Magnesium  -     Duplex Venous Upper Extremity - Left CAR    3. Left arm swelling  -     Cancel: US Venous Doppler Upper Extremity Left (duplex)  -     CBC Auto Differential  -     Comprehensive Metabolic Panel  -     TSH  -     Iron Profile  -     Vitamin B12  -      Vitamin D 25 Hydroxy  -     CK  -     C-reactive Protein  -     Sedimentation Rate  -     Magnesium  -     Duplex Venous Upper Extremity - Left CAR    4. Pedal edema  Comments:  Checking labs  Orders:  -     Cancel: US Venous Doppler Upper Extremity Left (duplex)  -     CBC Auto Differential  -     Comprehensive Metabolic Panel  -     TSH  -     Iron Profile  -     Vitamin B12  -     Vitamin D 25 Hydroxy  -     CK  -     C-reactive Protein  -     Sedimentation Rate  -     Magnesium    5. Muscle pain  Comments:  Checking labs  Orders:  -     Cancel: US Venous Doppler Upper Extremity Left (duplex)  -     CBC Auto Differential  -     Comprehensive Metabolic Panel  -     TSH  -     Iron Profile  -     Vitamin B12  -     Vitamin D 25 Hydroxy  -     CK  -     C-reactive Protein  -     Sedimentation Rate  -     Magnesium    6. Otalgia of both ears  Comments:  Normal exam bilaterally              Follow Up   Return if symptoms worsen or fail to improve.  Patient was given instructions and counseling regarding her condition or for health maintenance advice. Please see specific information pulled into the AVS if appropriate.

## 2022-05-09 ENCOUNTER — OFFICE VISIT (OUTPATIENT)
Dept: INTERNAL MEDICINE | Facility: CLINIC | Age: 37
End: 2022-05-09

## 2022-05-09 VITALS
HEART RATE: 65 BPM | HEIGHT: 63 IN | RESPIRATION RATE: 18 BRPM | DIASTOLIC BLOOD PRESSURE: 68 MMHG | WEIGHT: 176 LBS | TEMPERATURE: 96.5 F | SYSTOLIC BLOOD PRESSURE: 110 MMHG | OXYGEN SATURATION: 97 % | BODY MASS INDEX: 31.18 KG/M2

## 2022-05-09 DIAGNOSIS — M79.605 LEFT LEG PAIN: ICD-10-CM

## 2022-05-09 DIAGNOSIS — M79.602 LEFT ARM PAIN: ICD-10-CM

## 2022-05-09 DIAGNOSIS — R74.01 TRANSAMINITIS: Primary | ICD-10-CM

## 2022-05-09 PROCEDURE — 80074 ACUTE HEPATITIS PANEL: CPT | Performed by: NURSE PRACTITIONER

## 2022-05-09 PROCEDURE — 99214 OFFICE O/P EST MOD 30 MIN: CPT | Performed by: NURSE PRACTITIONER

## 2022-05-09 RX ORDER — ERGOCALCIFEROL 1.25 MG/1
50000 CAPSULE ORAL WEEKLY
Qty: 12 CAPSULE | Refills: 1 | Status: SHIPPED | OUTPATIENT
Start: 2022-05-09 | End: 2022-10-31

## 2022-05-09 RX ORDER — LANOLIN ALCOHOL/MO/W.PET/CERES
1000 CREAM (GRAM) TOPICAL DAILY
Qty: 90 TABLET | Refills: 1 | Status: SHIPPED | OUTPATIENT
Start: 2022-05-09 | End: 2022-11-29 | Stop reason: SDUPTHER

## 2022-05-09 RX ORDER — ERGOCALCIFEROL 1.25 MG/1
50000 CAPSULE ORAL WEEKLY
COMMUNITY
End: 2022-05-09 | Stop reason: SDUPTHER

## 2022-05-09 NOTE — PROGRESS NOTES
"Chief Complaint  Diarrhea, Vomiting, and Follow-up (Lab results)    Subjective          Minh Drake presents to [facility]  History of Present Illness  She reports that the left arm pain has improved.  She is still having some pain of left lower extremity.  She reports that this is no longer in her ankle.    She reports that vomiting resolved. She has had soft formed stools, no additional diarrhea.    Elevated liver enzymes    Denies fever, chills, body aches    Labs reviewed and negative Doppler study of left upper extremity  Objective   Vital Signs:   /68 (BP Location: Right arm, Patient Position: Sitting, Cuff Size: Adult)   Pulse 65   Temp 96.5 °F (35.8 °C)   Resp 18   Ht 160 cm (63\")   Wt 79.8 kg (176 lb)   SpO2 97%   BMI 31.18 kg/m²     Physical Exam  Vitals and nursing note reviewed.   Constitutional:       General: She is not in acute distress.     Appearance: Normal appearance.   HENT:      Head: Normocephalic and atraumatic.      Right Ear: Tympanic membrane, ear canal and external ear normal.      Left Ear: Tympanic membrane, ear canal and external ear normal.      Nose: Nose normal.      Mouth/Throat:      Mouth: Mucous membranes are moist.   Eyes:      Conjunctiva/sclera: Conjunctivae normal.   Cardiovascular:      Rate and Rhythm: Normal rate and regular rhythm.      Pulses: Normal pulses.      Heart sounds: Normal heart sounds. No murmur heard.    No friction rub. No gallop.   Pulmonary:      Effort: Pulmonary effort is normal. No respiratory distress.      Breath sounds: No wheezing, rhonchi or rales.   Abdominal:      Palpations: There is no mass.      Tenderness: There is no abdominal tenderness. There is no guarding.   Musculoskeletal:      Cervical back: Neck supple.      Comments: Pulses equal bilaterally.  No discoloration or swelling noted of lower extremities bilaterally.  No calf tenderness.   Skin:     General: Skin is warm " and dry.   Neurological:      General: No focal deficit present.      Mental Status: She is alert and oriented to person, place, and time.   Psychiatric:         Mood and Affect: Mood normal.         Behavior: Behavior normal.        Result Review :          Procedures      Assessment and Plan    Diagnoses and all orders for this visit:    1. Transaminitis (Primary)  Comments:  Labs and liver ultrasound  Orders:  -     US Liver; Future  -     Hepatitis panel, acute    2. Left arm pain  Comments:  We will continue with physical therapy and call with worsening.    3. Left leg pain  Comments:  Discussed possible causes.  Low suspicion for DVT based on Wells criteria.  Discussed muscle strain.  She will call with new or worsening sx    Other orders  -     vitamin D (ERGOCALCIFEROL) 1.25 MG (07495 UT) capsule capsule; Take 1 capsule by mouth 1 (One) Time Per Week.  Dispense: 12 capsule; Refill: 1  -     vitamin B-12 (CYANOCOBALAMIN) 1000 MCG tablet; Take 1 tablet by mouth Daily.  Dispense: 90 tablet; Refill: 1              Follow Up   Return if symptoms worsen or fail to improve.  Patient was given instructions and counseling regarding her condition or for health maintenance advice. Please see specific information pulled into the AVS if appropriate.

## 2022-05-10 LAB
HAV IGM SERPL QL IA: NORMAL
HBV CORE IGM SERPL QL IA: NORMAL
HBV SURFACE AG SERPL QL IA: NORMAL
HCV AB SER DONR QL: NORMAL

## 2022-05-23 DIAGNOSIS — G47.00 INSOMNIA, UNSPECIFIED TYPE: Chronic | ICD-10-CM

## 2022-05-23 RX ORDER — TRAZODONE HYDROCHLORIDE 50 MG/1
50 TABLET ORAL NIGHTLY
Qty: 90 TABLET | Refills: 1 | Status: SHIPPED | OUTPATIENT
Start: 2022-05-23

## 2022-06-08 ENCOUNTER — APPOINTMENT (OUTPATIENT)
Dept: ULTRASOUND IMAGING | Facility: HOSPITAL | Age: 37
End: 2022-06-08

## 2022-06-10 PROCEDURE — U0004 COV-19 TEST NON-CDC HGH THRU: HCPCS | Performed by: EMERGENCY MEDICINE

## 2022-07-18 ENCOUNTER — OFFICE VISIT (OUTPATIENT)
Dept: INTERNAL MEDICINE | Facility: CLINIC | Age: 37
End: 2022-07-18

## 2022-07-18 VITALS
BODY MASS INDEX: 31.4 KG/M2 | RESPIRATION RATE: 18 BRPM | WEIGHT: 177.2 LBS | HEART RATE: 87 BPM | DIASTOLIC BLOOD PRESSURE: 72 MMHG | TEMPERATURE: 97.6 F | HEIGHT: 63 IN | OXYGEN SATURATION: 97 % | SYSTOLIC BLOOD PRESSURE: 116 MMHG

## 2022-07-18 DIAGNOSIS — Z00.00 ANNUAL PHYSICAL EXAM: Primary | ICD-10-CM

## 2022-07-18 DIAGNOSIS — R74.01 TRANSAMINITIS: ICD-10-CM

## 2022-07-18 PROCEDURE — 99395 PREV VISIT EST AGE 18-39: CPT | Performed by: NURSE PRACTITIONER

## 2022-07-18 RX ORDER — HYDROXYZINE HYDROCHLORIDE 25 MG/1
TABLET, FILM COATED ORAL
COMMUNITY
Start: 2022-07-06 | End: 2022-11-29 | Stop reason: SDUPTHER

## 2022-07-18 NOTE — PROGRESS NOTES
"Chief Complaint  Annual Exam    Subjective          Minh Drake presents to [facility]  History of Present Illness  Not fasting today  She has been working lots of overtime    She did not get liver US done due to scheduling    She did some allergy testing with derm several years ago. She reports recent rash to bromfed  Objective   Vital Signs:   /72 (BP Location: Left arm, Patient Position: Sitting, Cuff Size: Adult)   Pulse 87   Temp 97.6 °F (36.4 °C)   Resp 18   Ht 160 cm (63\")   Wt 80.4 kg (177 lb 3.2 oz)   SpO2 97%   BMI 31.39 kg/m²     Physical Exam  Vitals and nursing note reviewed.   Constitutional:       General: She is not in acute distress.     Appearance: Normal appearance.   HENT:      Head: Normocephalic and atraumatic.      Right Ear: Tympanic membrane, ear canal and external ear normal.      Left Ear: Tympanic membrane, ear canal and external ear normal.      Nose: Nose normal.      Mouth/Throat:      Mouth: Mucous membranes are moist.   Eyes:      Conjunctiva/sclera: Conjunctivae normal.   Cardiovascular:      Rate and Rhythm: Normal rate and regular rhythm.      Pulses: Normal pulses.      Heart sounds: Normal heart sounds. No murmur heard.    No friction rub. No gallop.   Pulmonary:      Effort: Pulmonary effort is normal. No respiratory distress.      Breath sounds: No wheezing, rhonchi or rales.   Abdominal:      Tenderness: There is no abdominal tenderness.   Musculoskeletal:      Cervical back: Neck supple.      Right lower leg: No edema.      Left lower leg: No edema.   Lymphadenopathy:      Cervical: No cervical adenopathy.   Skin:     General: Skin is warm and dry.   Neurological:      General: No focal deficit present.      Mental Status: She is alert and oriented to person, place, and time.   Psychiatric:         Mood and Affect: Mood normal.         Behavior: Behavior normal.        Result Review :          Procedures    "   Assessment and Plan    Diagnoses and all orders for this visit:    1. Annual physical exam (Primary)  -     Comprehensive Metabolic Panel; Future  -     CBC & Differential; Future  -     TSH; Future  -     Lipid Panel; Future    2. Transaminitis  Comments:  She reports that she do not have right upper quadrant ultrasound due to scheduling. Repeating labs this week.  If not significantly improved we will plan for us        19 to 39: Counseling/Anticipatory Guidance Discussed: nutrition, family planning/contraception, physical activity, healthy weight, mental health and immunizations      Follow Up   Return in about 6 months (around 1/18/2023).  Patient was given instructions and counseling regarding her condition or for health maintenance advice. Please see specific information pulled into the AVS if appropriate.

## 2022-07-25 ENCOUNTER — CLINICAL SUPPORT (OUTPATIENT)
Dept: INTERNAL MEDICINE | Facility: CLINIC | Age: 37
End: 2022-07-25

## 2022-07-25 DIAGNOSIS — Z01.89 ENCOUNTER FOR LABORATORY EXAMINATION: ICD-10-CM

## 2022-07-25 DIAGNOSIS — Z00.00 ANNUAL PHYSICAL EXAM: ICD-10-CM

## 2022-07-25 LAB
ALBUMIN SERPL-MCNC: 4.4 G/DL (ref 3.5–5.2)
ALBUMIN/GLOB SERPL: 1.4 G/DL
ALP SERPL-CCNC: 46 U/L (ref 39–117)
ALT SERPL W P-5'-P-CCNC: 131 U/L (ref 1–33)
ANION GAP SERPL CALCULATED.3IONS-SCNC: 13.5 MMOL/L (ref 5–15)
AST SERPL-CCNC: 114 U/L (ref 1–32)
BASOPHILS # BLD AUTO: 0.11 10*3/MM3 (ref 0–0.2)
BASOPHILS NFR BLD AUTO: 1.2 % (ref 0–1.5)
BILIRUB SERPL-MCNC: 0.3 MG/DL (ref 0–1.2)
BUN SERPL-MCNC: 10 MG/DL (ref 6–20)
BUN/CREAT SERPL: 18.9 (ref 7–25)
CALCIUM SPEC-SCNC: 9.4 MG/DL (ref 8.6–10.5)
CHLORIDE SERPL-SCNC: 103 MMOL/L (ref 98–107)
CHOLEST SERPL-MCNC: 185 MG/DL (ref 0–200)
CO2 SERPL-SCNC: 20.5 MMOL/L (ref 22–29)
CREAT SERPL-MCNC: 0.53 MG/DL (ref 0.57–1)
DEPRECATED RDW RBC AUTO: 37.7 FL (ref 37–54)
EGFRCR SERPLBLD CKD-EPI 2021: 123.9 ML/MIN/1.73
EOSINOPHIL # BLD AUTO: 0.45 10*3/MM3 (ref 0–0.4)
EOSINOPHIL NFR BLD AUTO: 4.8 % (ref 0.3–6.2)
ERYTHROCYTE [DISTWIDTH] IN BLOOD BY AUTOMATED COUNT: 11.7 % (ref 12.3–15.4)
GLOBULIN UR ELPH-MCNC: 3.1 GM/DL
GLUCOSE SERPL-MCNC: 82 MG/DL (ref 65–99)
HCT VFR BLD AUTO: 38.6 % (ref 34–46.6)
HDLC SERPL-MCNC: 46 MG/DL (ref 40–60)
HGB BLD-MCNC: 13.3 G/DL (ref 12–15.9)
IMM GRANULOCYTES # BLD AUTO: 0.01 10*3/MM3 (ref 0–0.05)
IMM GRANULOCYTES NFR BLD AUTO: 0.1 % (ref 0–0.5)
LDLC SERPL CALC-MCNC: 101 MG/DL (ref 0–100)
LDLC/HDLC SERPL: 2.06 {RATIO}
LYMPHOCYTES # BLD AUTO: 3.69 10*3/MM3 (ref 0.7–3.1)
LYMPHOCYTES NFR BLD AUTO: 39 % (ref 19.6–45.3)
MCH RBC QN AUTO: 31.1 PG (ref 26.6–33)
MCHC RBC AUTO-ENTMCNC: 34.5 G/DL (ref 31.5–35.7)
MCV RBC AUTO: 90.2 FL (ref 79–97)
MONOCYTES # BLD AUTO: 0.53 10*3/MM3 (ref 0.1–0.9)
MONOCYTES NFR BLD AUTO: 5.6 % (ref 5–12)
NEUTROPHILS NFR BLD AUTO: 4.67 10*3/MM3 (ref 1.7–7)
NEUTROPHILS NFR BLD AUTO: 49.3 % (ref 42.7–76)
NRBC BLD AUTO-RTO: 0 /100 WBC (ref 0–0.2)
PLATELET # BLD AUTO: 291 10*3/MM3 (ref 140–450)
PMV BLD AUTO: 9.9 FL (ref 6–12)
POTASSIUM SERPL-SCNC: 4.4 MMOL/L (ref 3.5–5.2)
PROT SERPL-MCNC: 7.5 G/DL (ref 6–8.5)
RBC # BLD AUTO: 4.28 10*6/MM3 (ref 3.77–5.28)
SODIUM SERPL-SCNC: 137 MMOL/L (ref 136–145)
TRIGL SERPL-MCNC: 222 MG/DL (ref 0–150)
TSH SERPL DL<=0.05 MIU/L-ACNC: 2.04 UIU/ML (ref 0.27–4.2)
VLDLC SERPL-MCNC: 38 MG/DL (ref 5–40)
WBC NRBC COR # BLD: 9.46 10*3/MM3 (ref 3.4–10.8)

## 2022-07-25 PROCEDURE — 80050 GENERAL HEALTH PANEL: CPT | Performed by: NURSE PRACTITIONER

## 2022-07-25 PROCEDURE — 36415 COLL VENOUS BLD VENIPUNCTURE: CPT | Performed by: NURSE PRACTITIONER

## 2022-07-25 PROCEDURE — 80061 LIPID PANEL: CPT | Performed by: NURSE PRACTITIONER

## 2022-08-15 ENCOUNTER — APPOINTMENT (OUTPATIENT)
Dept: ULTRASOUND IMAGING | Facility: HOSPITAL | Age: 37
End: 2022-08-15

## 2022-08-25 ENCOUNTER — HOSPITAL ENCOUNTER (OUTPATIENT)
Dept: ULTRASOUND IMAGING | Facility: HOSPITAL | Age: 37
Discharge: HOME | End: 2022-08-25
Admitting: NURSE PRACTITIONER

## 2022-08-25 DIAGNOSIS — R74.01 TRANSAMINITIS: ICD-10-CM

## 2022-08-25 DIAGNOSIS — R74.01 TRANSAMINITIS: Primary | ICD-10-CM

## 2022-08-25 PROCEDURE — 76705 ECHO EXAM OF ABDOMEN: CPT

## 2022-09-13 ENCOUNTER — TELEPHONE (OUTPATIENT)
Dept: OBSTETRICS AND GYNECOLOGY | Facility: CLINIC | Age: 37
End: 2022-09-13

## 2022-09-13 NOTE — TELEPHONE ENCOUNTER
Patient called left message on voicemail wanted to make an appointment.  Returned patient call left message.

## 2022-09-14 ENCOUNTER — TRANSCRIBE ORDERS (OUTPATIENT)
Dept: ADMINISTRATIVE | Facility: HOSPITAL | Age: 37
End: 2022-09-14

## 2022-09-14 DIAGNOSIS — Z12.31 SCREENING MAMMOGRAM, ENCOUNTER FOR: Primary | ICD-10-CM

## 2022-09-15 ENCOUNTER — OFFICE VISIT (OUTPATIENT)
Dept: GASTROENTEROLOGY | Facility: CLINIC | Age: 37
End: 2022-09-15

## 2022-09-15 ENCOUNTER — LAB (OUTPATIENT)
Dept: LAB | Facility: HOSPITAL | Age: 37
End: 2022-09-15

## 2022-09-15 VITALS
WEIGHT: 174.8 LBS | HEIGHT: 63 IN | BODY MASS INDEX: 30.97 KG/M2 | SYSTOLIC BLOOD PRESSURE: 113 MMHG | HEART RATE: 81 BPM | DIASTOLIC BLOOD PRESSURE: 87 MMHG

## 2022-09-15 DIAGNOSIS — R79.89 ELEVATED FERRITIN: ICD-10-CM

## 2022-09-15 DIAGNOSIS — R79.89 ELEVATED LFTS: ICD-10-CM

## 2022-09-15 DIAGNOSIS — K76.0 FATTY LIVER: ICD-10-CM

## 2022-09-15 DIAGNOSIS — K83.8 COMMON BILE DUCT DILATION: ICD-10-CM

## 2022-09-15 DIAGNOSIS — R79.89 ELEVATED LFTS: Primary | ICD-10-CM

## 2022-09-15 LAB
ALBUMIN SERPL-MCNC: 4.6 G/DL (ref 3.5–5.2)
ALP SERPL-CCNC: 48 U/L (ref 39–117)
ALPHA-FETOPROTEIN: 2.86 NG/ML (ref 0–8.3)
ALT SERPL W P-5'-P-CCNC: 94 U/L (ref 1–33)
AST SERPL-CCNC: 73 U/L (ref 1–32)
BILIRUB CONJ SERPL-MCNC: <0.2 MG/DL (ref 0–0.3)
BILIRUB INDIRECT SERPL-MCNC: ABNORMAL MG/DL
BILIRUB SERPL-MCNC: 0.3 MG/DL (ref 0–1.2)
CERULOPLASMIN SERPL-MCNC: 41 MG/DL (ref 19–39)
DSDNA IGG SERPL IA-ACNC: NEGATIVE [IU]/ML
FERRITIN SERPL-MCNC: 842 NG/ML (ref 13–150)
HAV IGM SERPL QL IA: NORMAL
HBV CORE IGM SERPL QL IA: NORMAL
HBV SURFACE AG SERPL QL IA: NORMAL
HCV AB SER DONR QL: NORMAL
INR PPP: 0.92 (ref 0.86–1.15)
IRON 24H UR-MRATE: 141 MCG/DL (ref 37–145)
IRON SATN MFR SERPL: 23 % (ref 20–50)
NUCLEAR IGG SER IA-RTO: NEGATIVE
PROT SERPL-MCNC: 8 G/DL (ref 6–8.5)
PROTHROMBIN TIME: 12.5 SECONDS (ref 11.8–14.9)
TIBC SERPL-MCNC: 614 MCG/DL (ref 298–536)
TRANSFERRIN SERPL-MCNC: 412 MG/DL (ref 200–360)

## 2022-09-15 PROCEDURE — 86381 MITOCHONDRIAL ANTIBODY EACH: CPT

## 2022-09-15 PROCEDURE — 82104 ALPHA-1-ANTITRYPSIN PHENO: CPT

## 2022-09-15 PROCEDURE — 84466 ASSAY OF TRANSFERRIN: CPT

## 2022-09-15 PROCEDURE — 86334 IMMUNOFIX E-PHORESIS SERUM: CPT

## 2022-09-15 PROCEDURE — 86038 ANTINUCLEAR ANTIBODIES: CPT

## 2022-09-15 PROCEDURE — 84165 PROTEIN E-PHORESIS SERUM: CPT

## 2022-09-15 PROCEDURE — 82390 ASSAY OF CERULOPLASMIN: CPT

## 2022-09-15 PROCEDURE — 82728 ASSAY OF FERRITIN: CPT

## 2022-09-15 PROCEDURE — 85610 PROTHROMBIN TIME: CPT

## 2022-09-15 PROCEDURE — 82525 ASSAY OF COPPER: CPT

## 2022-09-15 PROCEDURE — 82103 ALPHA-1-ANTITRYPSIN TOTAL: CPT

## 2022-09-15 PROCEDURE — 86225 DNA ANTIBODY NATIVE: CPT

## 2022-09-15 PROCEDURE — 99214 OFFICE O/P EST MOD 30 MIN: CPT | Performed by: NURSE PRACTITIONER

## 2022-09-15 PROCEDURE — 80074 ACUTE HEPATITIS PANEL: CPT

## 2022-09-15 PROCEDURE — 80076 HEPATIC FUNCTION PANEL: CPT

## 2022-09-15 PROCEDURE — 86015 ACTIN ANTIBODY EACH: CPT

## 2022-09-15 PROCEDURE — 82105 ALPHA-FETOPROTEIN SERUM: CPT

## 2022-09-15 PROCEDURE — 82784 ASSAY IGA/IGD/IGG/IGM EACH: CPT

## 2022-09-15 PROCEDURE — 83540 ASSAY OF IRON: CPT

## 2022-09-15 PROCEDURE — 84155 ASSAY OF PROTEIN SERUM: CPT

## 2022-09-15 PROCEDURE — 36415 COLL VENOUS BLD VENIPUNCTURE: CPT

## 2022-09-15 RX ORDER — ACETAMINOPHEN 500 MG
TABLET ORAL
COMMUNITY
Start: 2022-09-12

## 2022-09-15 NOTE — PROGRESS NOTES
Patient Name: Minh Zhao   Visit Date: 09/15/2022   Patient ID: [id number]  Provider: KYUNG Aguilar    Sex: female  Location:  Location Address:  Location Phone: [phone]  [place] [address] [phone]    YOB: 1985  Age: 37 y.o.   Primary Care Provider Jackelyn Helton APRN      Referring Provider: KYUNG Thapa        Chief Complaint  Elevated Hepatic Enzymes and Diarrhea (Pt states having loose stool for 2 days 2 times a day )    History of Present Illness  New pt presents w elev LFTs. States maybe drinks ETOH once/month.  Pt states hx muscle / neck pain and was taking Naproxen frequently. Concerned this has elev LFTs. Now taking Tylenol.   Denies any herbals or vitamins other than B12 and Vit D.  Denies abd pain. No blood in stool. Stool looser just the last 2 days.  Pt states she has strained her neck after heavy lifting and assoc w neck pain she has felt some pain w swallowing. States sx are getting better.  No HB.     Ultrasound of the liver 8/25/2022: fatty liver, No liver lesion is seen CBD 6.4 mm, patient still has gallbladder, no gallstones seen, no cholecystitis noted  Past Medical History:   Diagnosis Date   • Allergic rhinitis    • Asthma     4th grade   • Broken bones     Elementary   • Migraine headache    • Sinus trouble        History reviewed. No pertinent surgical history.    Allergies   Allergen Reactions   • Brompheniramine-Pseudoeph Rash   • Cephalexin Rash       Family History   Problem Relation Age of Onset   • Other Mother         Cerebrovascular Accident   • Prostate cancer Father    • Breast cancer Sister    • Colon cancer Neg Hx         Social History     Tobacco Use   • Smoking status: Never Smoker   • Smokeless tobacco: Never Used   Vaping Use   • Vaping Use: Never used   Substance Use Topics   • Alcohol use: Not Currently     Comment: rarely   • Drug use: Never       Objective     Vital Signs:   /87 (BP Location: Left arm, Patient  "Position: Sitting, Cuff Size: Adult)   Pulse 81   Ht 160 cm (63\")   Wt 79.3 kg (174 lb 12.8 oz)   BMI 30.96 kg/m²       Physical Exam  Constitutional:       General: The patient is not in acute distress.     Appearance: Normal appearance.   HENT:      Head: Normocephalic and atraumatic.      Nose: Nose normal.   Pulmonary:      Effort: Pulmonary effort is normal. No respiratory distress.   Abdominal:      General: Abdomen is flat.      Palpations: Abdomen is soft. There is no mass.      Tenderness: There is no abdominal tenderness. There is no guarding.   Musculoskeletal:      Cervical back: Neck supple.      Right lower leg: No edema.      Left lower leg: No edema.   Skin:     General: Skin is warm and dry.   Neurological:      General: No focal deficit present.      Mental Status: The patient is alert and oriented to person, place, and time.      Gait: Gait normal.   Psychiatric:         Mood and Affect: Mood normal.         Speech: Speech normal.         Behavior: Behavior normal.         Thought Content: Thought content normal.     Result Review :   The following data was reviewed by: KYUNG Aguilar on 09/15/2022:    CBC w/diff    CBC w/Diff 11/5/21 5/5/22 7/25/22   WBC 8.61 8.51 9.46   RBC 4.34 4.24 4.28   Hemoglobin 13.3 13.2 13.3   Hematocrit 39.2 39.3 38.6   MCV 90.3 92.7 90.2   MCH 30.6 31.1 31.1   MCHC 33.9 33.6 34.5   RDW 11.7 (A) 12.1 (A) 11.7 (A)   Platelets 289 209 291   Neutrophil Rel % 53.3 60.2 49.3   Immature Granulocyte Rel % 0.2 0.4 0.1   Lymphocyte Rel % 33.7 28.2 39.0   Monocyte Rel % 6.5 7.5 5.6   Eosinophil Rel % 5.1 3.3 4.8   Basophil Rel % 1.2 0.4 1.2   (A) Abnormal value            CMP    CMP 11/5/21 5/5/22 7/25/22   Glucose 121 (A) 134 (A) 82   BUN 13 10 10   Creatinine 0.55 (A) 0.62 0.53 (A)   eGFR Non  Am 126     eGFR African Am >150     Sodium 136 139 137   Potassium 4.1 4.4 4.4   Chloride 102 106 103   Calcium 9.2 9.4 9.4   Albumin 4.30 3.80 4.40   Total " Bilirubin <0.2 0.2 0.3   Alkaline Phosphatase 55 43 46   AST (SGOT) 59 (A) 62 (A) 114 (A)   ALT (SGPT) 51 (A) 66 (A) 131 (A)   (A) Abnormal value                          Assessment and Plan    Diagnoses and all orders for this visit:    1. Elevated LFTs (Primary)  -     AFP Tumor Marker; Future  -     Alpha - 1 - Antitrypsin Phenotype; Future  -     STEFANI; Future  -     Hepatitis Panel, Acute; Future  -     Iron Profile; Future  -     Ferritin; Future  -     Copper, Serum; Future  -     Protime-INR; Future  -     Ceruloplasmin; Future  -     Mitochondrial Antibodies, M2; Future  -     Anti-Smooth Muscle Antibody Titer; Future  -     LEIDY + PE; Future  -     MRI abdomen w wo contrast mrcp; Future  -     Hepatic Function Panel; Future    2. Common bile duct dilation  -     MRI abdomen w wo contrast mrcp; Future    3. Fatty liver            Follow Up   Return for next available.   Check MRI/MRCP  Liver w/u  NO ETOH  Discussed with patient importance of weight loss with 10% weight loss goal, low-carb diet reviewed, and the benefit of 2-4 c. Coffee/d discussed.  Written information given to patient today.  Requested pt call if any loose stools persist, currently only had a couple loose stools but explained we can do stool studies if sx persists.   Patient was given instructions and counseling regarding her condition or for health maintenance advice. Please see specific information pulled into the AVS if appropriate.

## 2022-09-16 LAB
ALBUMIN SERPL ELPH-MCNC: 3.7 G/DL (ref 2.9–4.4)
ALBUMIN/GLOB SERPL: 0.9 {RATIO} (ref 0.7–1.7)
ALPHA1 GLOB SERPL ELPH-MCNC: 0.4 G/DL (ref 0–0.4)
ALPHA2 GLOB SERPL ELPH-MCNC: 1 G/DL (ref 0.4–1)
B-GLOBULIN SERPL ELPH-MCNC: 1.5 G/DL (ref 0.7–1.3)
GAMMA GLOB SERPL ELPH-MCNC: 1.4 G/DL (ref 0.4–1.8)
GLOBULIN SER-MCNC: 4.3 G/DL (ref 2.2–3.9)
IGA SERPL-MCNC: 219 MG/DL (ref 87–352)
IGG SERPL-MCNC: 1343 MG/DL (ref 586–1602)
IGM SERPL-MCNC: 65 MG/DL (ref 26–217)
INTERPRETATION SERPL IEP-IMP: ABNORMAL
LABORATORY COMMENT REPORT: ABNORMAL
M PROTEIN SERPL ELPH-MCNC: ABNORMAL G/DL
MITOCHONDRIA M2 IGG SER-ACNC: <20 UNITS (ref 0–20)
PROT SERPL-MCNC: 8 G/DL (ref 6–8.5)
SMA IGG SER-ACNC: 9 UNITS (ref 0–19)

## 2022-09-17 LAB — COPPER SERPL-MCNC: 163 UG/DL (ref 80–158)

## 2022-09-19 LAB
A1AT PHENOTYP SERPL IFE: ABNORMAL
A1AT SERPL-MCNC: 211 MG/DL (ref 100–188)

## 2022-10-05 RX ORDER — FLUTICASONE PROPIONATE 50 MCG
SPRAY, SUSPENSION (ML) NASAL
Qty: 16 G | Refills: 0 | Status: SHIPPED | OUTPATIENT
Start: 2022-10-05 | End: 2022-11-29 | Stop reason: SDUPTHER

## 2022-10-07 ENCOUNTER — LAB (OUTPATIENT)
Dept: LAB | Facility: HOSPITAL | Age: 37
End: 2022-10-07

## 2022-10-07 ENCOUNTER — APPOINTMENT (OUTPATIENT)
Dept: MRI IMAGING | Facility: HOSPITAL | Age: 37
End: 2022-10-07

## 2022-10-07 DIAGNOSIS — R79.89 ELEVATED FERRITIN: ICD-10-CM

## 2022-10-07 DIAGNOSIS — R79.89 ELEVATED LFTS: ICD-10-CM

## 2022-10-07 PROCEDURE — 36415 COLL VENOUS BLD VENIPUNCTURE: CPT

## 2022-10-07 PROCEDURE — 81256 HFE GENE: CPT

## 2022-10-13 LAB — HFE GENE MUT ANL BLD/T: NORMAL

## 2022-10-14 ENCOUNTER — HOSPITAL ENCOUNTER (OUTPATIENT)
Dept: MRI IMAGING | Facility: HOSPITAL | Age: 37
Discharge: HOME | End: 2022-10-14
Admitting: NURSE PRACTITIONER

## 2022-10-14 DIAGNOSIS — K83.8 COMMON BILE DUCT DILATION: ICD-10-CM

## 2022-10-14 DIAGNOSIS — R79.89 ELEVATED LFTS: ICD-10-CM

## 2022-10-14 PROCEDURE — 74183 MRI ABD W/O CNTR FLWD CNTR: CPT

## 2022-10-14 PROCEDURE — A9577 INJ MULTIHANCE: HCPCS | Performed by: NURSE PRACTITIONER

## 2022-10-14 PROCEDURE — 0 GADOBENATE DIMEGLUMINE 529 MG/ML SOLUTION: Performed by: NURSE PRACTITIONER

## 2022-10-14 RX ADMIN — GADOBENATE DIMEGLUMINE 15 ML: 529 INJECTION, SOLUTION INTRAVENOUS at 10:59

## 2022-10-19 ENCOUNTER — HOSPITAL ENCOUNTER (OUTPATIENT)
Dept: GENERAL RADIOLOGY | Facility: HOSPITAL | Age: 37
Discharge: HOME | End: 2022-10-19
Admitting: NURSE PRACTITIONER

## 2022-10-19 ENCOUNTER — OFFICE VISIT (OUTPATIENT)
Dept: INTERNAL MEDICINE | Facility: CLINIC | Age: 37
End: 2022-10-19

## 2022-10-19 VITALS
HEIGHT: 63 IN | SYSTOLIC BLOOD PRESSURE: 116 MMHG | WEIGHT: 174.6 LBS | HEART RATE: 86 BPM | BODY MASS INDEX: 30.94 KG/M2 | OXYGEN SATURATION: 98 % | TEMPERATURE: 97.5 F | DIASTOLIC BLOOD PRESSURE: 64 MMHG

## 2022-10-19 DIAGNOSIS — S99.921A INJURY OF TOE ON RIGHT FOOT, INITIAL ENCOUNTER: Primary | ICD-10-CM

## 2022-10-19 PROCEDURE — 73630 X-RAY EXAM OF FOOT: CPT

## 2022-10-19 PROCEDURE — 99213 OFFICE O/P EST LOW 20 MIN: CPT | Performed by: NURSE PRACTITIONER

## 2022-10-19 NOTE — PROGRESS NOTES
"Chief Complaint  Toe Injury (4th right)    Subjective        Minh Drake presents to [facility] for History of Present Illness  Concerns for injury to the right fourth digit on the foot.  Patient reports about a month ago she had kicked her couch, accidentally, thought she broke her toe then.  Toe was starting to heal, but then last night she dropped her cell phone device onto her toe, it was significantly painful, and has been swollen since.  Patient is wanting to have it evaluated for fracture via x-ray.       Objective   Vital Signs:   /64 (BP Location: Left arm, Patient Position: Sitting, Cuff Size: Adult)   Pulse 86   Temp 97.5 °F (36.4 °C) (Temporal)   Ht 160 cm (63\")   Wt 79.2 kg (174 lb 9.6 oz)   SpO2 98%   BMI 30.93 kg/m²     Physical Exam  Vitals and nursing note reviewed.   Constitutional:       Appearance: Normal appearance.   HENT:      Head: Normocephalic and atraumatic.   Pulmonary:      Effort: Pulmonary effort is normal.   Musculoskeletal:      Right foot: Swelling present.      Comments: Right fourth digit swollen and tender to touch.   Neurological:      Mental Status: She is alert.   Psychiatric:         Mood and Affect: Mood normal.         Thought Content: Thought content normal.        Result Review :  {The following data was reviewed by KYUNG Washburn on 10/19/22                Diagnoses and all orders for this visit:    1. Injury of toe on right foot, initial encounter (Primary)  -     XR Foot 3+ View Right    Will send patient for x-ray, we will follow-up based on results.      Follow Up   No follow-ups on file.  Patient was given instructions and counseling regarding her condition or for health maintenance advice. Please see specific information pulled into the AVS if appropriate.     KYUNG Washburn  10/19/2022  This note was electronically signed.       "

## 2022-10-31 RX ORDER — ERGOCALCIFEROL 1.25 MG/1
CAPSULE ORAL
Qty: 12 CAPSULE | Refills: 1 | Status: SHIPPED | OUTPATIENT
Start: 2022-10-31 | End: 2022-11-29 | Stop reason: SDUPTHER

## 2022-11-07 ENCOUNTER — HOSPITAL ENCOUNTER (OUTPATIENT)
Dept: MAMMOGRAPHY | Facility: HOSPITAL | Age: 37
Discharge: HOME | End: 2022-11-07
Admitting: OBSTETRICS & GYNECOLOGY

## 2022-11-07 DIAGNOSIS — Z12.31 SCREENING MAMMOGRAM, ENCOUNTER FOR: ICD-10-CM

## 2022-11-07 PROCEDURE — 77067 SCR MAMMO BI INCL CAD: CPT

## 2022-11-07 PROCEDURE — 77063 BREAST TOMOSYNTHESIS BI: CPT

## 2022-11-16 ENCOUNTER — TELEPHONE (OUTPATIENT)
Dept: INTERNAL MEDICINE | Facility: CLINIC | Age: 37
End: 2022-11-16

## 2022-11-29 ENCOUNTER — OFFICE VISIT (OUTPATIENT)
Dept: INTERNAL MEDICINE | Facility: CLINIC | Age: 37
End: 2022-11-29

## 2022-11-29 ENCOUNTER — HOSPITAL ENCOUNTER (OUTPATIENT)
Dept: GENERAL RADIOLOGY | Facility: HOSPITAL | Age: 37
Discharge: HOME | End: 2022-11-29
Admitting: NURSE PRACTITIONER

## 2022-11-29 VITALS
OXYGEN SATURATION: 98 % | SYSTOLIC BLOOD PRESSURE: 112 MMHG | DIASTOLIC BLOOD PRESSURE: 74 MMHG | WEIGHT: 172.4 LBS | HEART RATE: 91 BPM | TEMPERATURE: 98.2 F | BODY MASS INDEX: 29.43 KG/M2 | HEIGHT: 64 IN

## 2022-11-29 DIAGNOSIS — J90 BILATERAL PLEURAL EFFUSION: ICD-10-CM

## 2022-11-29 DIAGNOSIS — J90 BILATERAL PLEURAL EFFUSION: Primary | ICD-10-CM

## 2022-11-29 DIAGNOSIS — L29.9 ITCHING OF EAR: ICD-10-CM

## 2022-11-29 DIAGNOSIS — L29.9 ITCHY SCALP: ICD-10-CM

## 2022-11-29 PROBLEM — J34.9 SINUS TROUBLE: Status: ACTIVE | Noted: 2022-11-29

## 2022-11-29 PROBLEM — T14.8XXA BROKEN BONES: Status: ACTIVE | Noted: 2022-11-29

## 2022-11-29 PROCEDURE — 71046 X-RAY EXAM CHEST 2 VIEWS: CPT

## 2022-11-29 PROCEDURE — 99214 OFFICE O/P EST MOD 30 MIN: CPT | Performed by: NURSE PRACTITIONER

## 2022-11-29 RX ORDER — HYDROXYZINE HYDROCHLORIDE 25 MG/1
25 TABLET, FILM COATED ORAL
Qty: 90 TABLET | Refills: 1 | Status: SHIPPED | OUTPATIENT
Start: 2022-11-29

## 2022-11-29 RX ORDER — ERGOCALCIFEROL 1.25 MG/1
50000 CAPSULE ORAL WEEKLY
Qty: 13 CAPSULE | Refills: 1 | Status: SHIPPED | OUTPATIENT
Start: 2022-11-29 | End: 2023-03-02 | Stop reason: SDUPTHER

## 2022-11-29 RX ORDER — LANOLIN ALCOHOL/MO/W.PET/CERES
1000 CREAM (GRAM) TOPICAL DAILY
Qty: 90 TABLET | Refills: 1 | Status: SHIPPED | OUTPATIENT
Start: 2022-11-29

## 2022-11-29 RX ORDER — FLUTICASONE PROPIONATE 50 MCG
2 SPRAY, SUSPENSION (ML) NASAL DAILY
Qty: 16 G | Refills: 5 | Status: SHIPPED | OUTPATIENT
Start: 2022-11-29 | End: 2023-03-02 | Stop reason: SDUPTHER

## 2022-11-29 NOTE — PROGRESS NOTES
"Chief Complaint  MRI follow up-abdomen    Subjective          Minh Drake presents to [facility]  History of Present Illness  Need annual physical paperwork completed. Recent physical  She has been seeing GI for elevated liver enzymes  No pain at this time  Denies vomiting, nausea  Occasional diarrhea    She MRI abd that showed bilateral pleural effusions  Denies soa, cough, pedal edema, orthopnea    Scalp itching, occasional bumps    Ears itching  Objective   Vital Signs:   /74 (BP Location: Left arm, Patient Position: Sitting, Cuff Size: Adult)   Pulse 91   Temp 98.2 °F (36.8 °C) (Temporal)   Ht 161.3 cm (63.5\")   Wt 78.2 kg (172 lb 6.4 oz)   SpO2 98%   BMI 30.06 kg/m²     Physical Exam  Vitals and nursing note reviewed.   Constitutional:       General: She is not in acute distress.     Appearance: Normal appearance.   HENT:      Head: Normocephalic and atraumatic.      Right Ear: Tympanic membrane, ear canal and external ear normal.      Left Ear: Tympanic membrane, ear canal and external ear normal.      Nose: Nose normal.      Mouth/Throat:      Mouth: Mucous membranes are moist.   Eyes:      Conjunctiva/sclera: Conjunctivae normal.   Cardiovascular:      Rate and Rhythm: Normal rate and regular rhythm.      Pulses: Normal pulses.      Heart sounds: Normal heart sounds. No murmur heard.    No friction rub. No gallop.   Pulmonary:      Effort: Pulmonary effort is normal. No respiratory distress.      Breath sounds: No wheezing, rhonchi or rales.   Abdominal:      Tenderness: There is no abdominal tenderness.   Musculoskeletal:      Cervical back: Neck supple.      Right lower leg: No edema.      Left lower leg: No edema.   Lymphadenopathy:      Cervical: No cervical adenopathy.   Skin:     General: Skin is warm and dry.   Neurological:      General: No focal deficit present.      Mental Status: She is alert and oriented to person, place, and time. "   Psychiatric:         Mood and Affect: Mood normal.         Behavior: Behavior normal.        Result Review :          Procedures      Assessment and Plan    Diagnoses and all orders for this visit:    1. Bilateral pleural effusion (Primary)  Comments:  We will get chest x-ray and possibly CT  Orders:  -     XR Chest PA & Lateral; Future    2. Itching of ear  Comments:  reassuring exam    3. Itchy scalp  Comments:  We will try ketoconazole shampoo.  Scalp unremarkable for any lesions.  She will use this twice weekly for the next 4 weeks and stop if resolved    Other orders  -     hydrOXYzine (ATARAX) 25 MG tablet; Take 1 tablet by mouth every night at bedtime.  Dispense: 90 tablet; Refill: 1  -     vitamin B-12 (CYANOCOBALAMIN) 1000 MCG tablet; Take 1 tablet by mouth Daily.  Dispense: 90 tablet; Refill: 1  -     vitamin D (ERGOCALCIFEROL) 1.25 MG (47479 UT) capsule capsule; Take 1 capsule by mouth 1 (One) Time Per Week.  Dispense: 13 capsule; Refill: 1  -     fluticasone (FLONASE) 50 MCG/ACT nasal spray; 2 sprays by Each Nare route Daily.  Dispense: 16 g; Refill: 5  -     Ketoconazole 1 % shampoo; Apply 1 application topically 2 (Two) Times a Week.  Dispense: 200 mL; Refill: 1              Follow Up   Return in about 2 months (around 1/29/2023).  Patient was given instructions and counseling regarding her condition or for health maintenance advice. Please see specific information pulled into the AVS if appropriate.

## 2022-11-30 ENCOUNTER — TELEPHONE (OUTPATIENT)
Dept: OBSTETRICS AND GYNECOLOGY | Facility: CLINIC | Age: 37
End: 2022-11-30

## 2022-11-30 DIAGNOSIS — Z30.44 ENCOUNTER FOR SURVEILLANCE OF VAGINAL RING HORMONAL CONTRACEPTIVE DEVICE: ICD-10-CM

## 2022-11-30 RX ORDER — ETONOGESTREL AND ETHINYL ESTRADIOL 11.7; 2.7 MG/1; MG/1
INSERT, EXTENDED RELEASE VAGINAL
Qty: 1 EACH | Refills: 0 | Status: SHIPPED | OUTPATIENT
Start: 2022-11-30 | End: 2022-12-07 | Stop reason: SDUPTHER

## 2022-12-05 ENCOUNTER — OFFICE VISIT (OUTPATIENT)
Dept: GASTROENTEROLOGY | Facility: CLINIC | Age: 37
End: 2022-12-05

## 2022-12-05 VITALS
DIASTOLIC BLOOD PRESSURE: 76 MMHG | SYSTOLIC BLOOD PRESSURE: 111 MMHG | WEIGHT: 172.3 LBS | BODY MASS INDEX: 29.41 KG/M2 | HEIGHT: 64 IN | HEART RATE: 78 BPM

## 2022-12-05 DIAGNOSIS — K76.0 FATTY LIVER: ICD-10-CM

## 2022-12-05 DIAGNOSIS — E66.9 CLASS 1 OBESITY WITH SERIOUS COMORBIDITY AND BODY MASS INDEX (BMI) OF 30.0 TO 30.9 IN ADULT, UNSPECIFIED OBESITY TYPE: ICD-10-CM

## 2022-12-05 DIAGNOSIS — R79.89 ELEVATED FERRITIN: ICD-10-CM

## 2022-12-05 DIAGNOSIS — R79.89 ELEVATED LFTS: Primary | ICD-10-CM

## 2022-12-05 PROCEDURE — 99214 OFFICE O/P EST MOD 30 MIN: CPT | Performed by: NURSE PRACTITIONER

## 2022-12-05 NOTE — PROGRESS NOTES
Patient Name: Minh Zhao   Visit Date: 12/05/2022   Patient ID: [id number]  Provider: KYUNG Aguilar    Sex: female  Location:  Location Address:  Location Phone: [phone]  [place] [address] [phone]    YOB: 1985  Age: 37 y.o.   Primary Care Provider Jackelyn Helton APRN      Referring Provider: No ref. provider found        Chief Complaint  Elevated LFTs (Follow up from MRI ABD and labs )    History of Present Illness  Patient initially presented 9/15/2022 with elevated LFTs, reported having alcohol maybe once a month.  Ultrasound of the liver in August showed fatty liver and common bile duct 6.4 mm with gallbladder, no gallstones seen  MRI of the abdomen with and without contrast MRCP 10/14/2022: Significant fatty liver, enlarged to 21 cm, CBD 4 mm, trace bilateral pleural effusions-patient advised to follow-up with primary care for this    Liver work-up negative other than ferritin 842, iron saturation was normal, iron was normal, as a precaution hemochromatosis gene test--  1 gene detected, heterozygous finding usually not associated with increased risk of clinical symptoms    R/w pt liver w/u today. She states she has had to move and has injured her toe, she has had trouble sticking to low carb diet and exercising with all of this. She has lost 2# since last visit. Drinking 2 c . Coffee/d. States she had a couple days of diarrhea last week, stools have returned to normal. States this usually doesn't happen. NO blood in stool. No abd pain.   Past Medical History:   Diagnosis Date   • Allergic rhinitis    • Asthma     4th grade   • Broken bones     Elementary   • Migraine headache    • Sinus trouble        Past Surgical History:   Procedure Laterality Date   • WISDOM TOOTH EXTRACTION         Allergies   Allergen Reactions   • Brompheniramine-Pseudoeph Rash   • Cephalexin Rash       Family History   Problem Relation Age of Onset   • Prostate cancer Father    • Other  "Mother         Cerebrovascular Accident   • Breast cancer Sister    • Colon cancer Neg Hx    • Ovarian cancer Neg Hx    • Uterine cancer Neg Hx         Social History     Tobacco Use   • Smoking status: Never   • Smokeless tobacco: Never   Vaping Use   • Vaping Use: Never used   Substance Use Topics   • Alcohol use: Not Currently     Comment: rarely   • Drug use: Never       Objective     Vital Signs:   /76 (BP Location: Left arm, Patient Position: Sitting, Cuff Size: Adult)   Pulse 78   Ht 161.3 cm (63.5\")   Wt 78.2 kg (172 lb 4.8 oz)   BMI 30.04 kg/m²       Physical Exam  Constitutional:       General: The patient is not in acute distress.     Appearance: Normal appearance.   HENT:      Head: Normocephalic and atraumatic.      Nose: Nose normal.   Pulmonary:      Effort: Pulmonary effort is normal. No respiratory distress.   Abdominal:      General: Abdomen is flat.      Palpations: Abdomen is soft. There is no mass.      Tenderness: There is no abdominal tenderness. There is no guarding.   Musculoskeletal:      Cervical back: Neck supple.      Right lower leg: No edema.      Left lower leg: No edema.   Skin:     General: Skin is warm and dry.   Neurological:      General: No focal deficit present.      Mental Status: The patient is alert and oriented to person, place, and time.      Gait: Gait normal.   Psychiatric:         Mood and Affect: Mood normal.         Speech: Speech normal.         Behavior: Behavior normal.         Thought Content: Thought content normal.     Result Review :   The following data was reviewed by: KYUNG Aguilar on 12/05/2022:    CBC w/diff    CBC w/Diff 5/5/22 7/25/22   WBC 8.51 9.46   RBC 4.24 4.28   Hemoglobin 13.2 13.3   Hematocrit 39.3 38.6   MCV 92.7 90.2   MCH 31.1 31.1   MCHC 33.6 34.5   RDW 12.1 (A) 11.7 (A)   Platelets 209 291   Neutrophil Rel % 60.2 49.3   Immature Granulocyte Rel % 0.4 0.1   Lymphocyte Rel % 28.2 39.0   Monocyte Rel % 7.5 5.6 "   Eosinophil Rel % 3.3 4.8   Basophil Rel % 0.4 1.2   (A) Abnormal value            CMP    CMP 5/5/22 7/25/22 9/15/22 9/15/22      0931 0931   Glucose 134 (A) 82     BUN 10 10     Creatinine 0.62 0.53 (A)     Sodium 139 137     Potassium 4.4 4.4     Chloride 106 103     Calcium 9.4 9.4     Total Protein    8.0   Albumin 3.80 4.40 4.60 3.7   Globulin    4.3 (A)   Total Bilirubin 0.2 0.3 0.3    Alkaline Phosphatase 43 46 48    AST (SGOT) 62 (A) 114 (A) 73 (A)    ALT (SGPT) 66 (A) 131 (A) 94 (A)    (A) Abnormal value              Liver Workup   A-1 Antitrypsin   Date Value Ref Range Status   09/15/2022 211 (H) 100 - 188 mg/dL Final     dsDNA   Date Value Ref Range Status   09/15/2022 Negative Negative Final     Expanded ANSHU Screen   Date Value Ref Range Status   09/15/2022 Negative Negative Final     Smooth Muscle Ab   Date Value Ref Range Status   09/15/2022 9 0 - 19 Units Final     Comment:                      Negative                     0 - 19                   Weak positive               20 - 30                   Moderate to strong positive     >30   Actin Antibodies are found in 52-85% of patients with   autoimmune hepatitis or chronic active hepatitis and   in 22% of patients with primary biliary cirrhosis.     Ceruloplasmin   Date Value Ref Range Status   09/15/2022 41 (H) 19 - 39 mg/dL Final     Ferritin   Date Value Ref Range Status   09/15/2022 842.00 (H) 13.00 - 150.00 ng/mL Final     IgG   Date Value Ref Range Status   09/15/2022 1343 586 - 1602 mg/dL Final     IgA   Date Value Ref Range Status   09/15/2022 219 87 - 352 mg/dL Final     IgM   Date Value Ref Range Status   09/15/2022 65 26 - 217 mg/dL Final     Iron   Date Value Ref Range Status   09/15/2022 141 37 - 145 mcg/dL Final     TIBC   Date Value Ref Range Status   09/15/2022 614 (H) 298 - 536 mcg/dL Final     Iron Saturation   Date Value Ref Range Status   09/15/2022 23 20 - 50 % Final     Transferrin   Date Value Ref Range Status   09/15/2022 412  (H) 200 - 360 mg/dL Final     Mitochondrial Ab   Date Value Ref Range Status   09/15/2022 <20.0 0.0 - 20.0 Units Final     Comment:                                     Negative    0.0 - 20.0                                  Equivocal  20.1 - 24.9                                  Positive         >24.9  Mitochondrial (M2) Antibodies are found in 90-96% of  patients with primary biliary cirrhosis.     Protime   Date Value Ref Range Status   09/15/2022 12.5 11.8 - 14.9 Seconds Final     INR   Date Value Ref Range Status   09/15/2022 0.92 0.86 - 1.15 Final     ALPHA-FETOPROTEIN   Date Value Ref Range Status   09/15/2022 2.86 0 - 8.3 ng/mL Final     Acute HEP Panel   Hepatitis B Surface Ag   Date Value Ref Range Status   09/15/2022 Non-Reactive Non-Reactive Final     Hep A IgM   Date Value Ref Range Status   09/15/2022 Non-Reactive Non-Reactive Final     Hep B C IgM   Date Value Ref Range Status   09/15/2022 Non-Reactive Non-Reactive Final     Hepatitis C Ab   Date Value Ref Range Status   09/15/2022 Non-Reactive Non-Reactive Final               Assessment and Plan    Diagnoses and all orders for this visit:    1. Elevated LFTs (Primary)  -     Protime-INR; Future  -     Hepatic Function Panel; Future  -     Ferritin; Future  -     Iron Profile; Future  -     US Liver; Future    2. Fatty liver  -     Protime-INR; Future  -     Hepatic Function Panel; Future  -     Ferritin; Future  -     Iron Profile; Future  -     US Liver; Future    3. Elevated ferritin  -     Ferritin; Future  -     Iron Profile; Future    4. Class 1 obesity with serious comorbidity and body mass index (BMI) of 30.0 to 30.9 in adult, unspecified obesity type            Follow Up   Return in about 4 months (around 4/5/2023).   Encouraged to continue with low-carb diet and try to exercise, drink 2 to 4 cups of coffee per day  Recheck labs with iron studies again the end of this month, recheck ultrasound in April and follow-up after that  Consider  Fibroscan next visit  Call or return to clinic PRN if any change in bowel pattern, blood in stool, or new GI sx.     Patient was given instructions and counseling regarding her condition or for health maintenance advice. Please see specific information pulled into the AVS if appropriate.

## 2022-12-07 ENCOUNTER — OFFICE VISIT (OUTPATIENT)
Dept: OBSTETRICS AND GYNECOLOGY | Facility: CLINIC | Age: 37
End: 2022-12-07

## 2022-12-07 VITALS
DIASTOLIC BLOOD PRESSURE: 82 MMHG | HEART RATE: 92 BPM | BODY MASS INDEX: 30.3 KG/M2 | WEIGHT: 171 LBS | HEIGHT: 63 IN | SYSTOLIC BLOOD PRESSURE: 128 MMHG

## 2022-12-07 DIAGNOSIS — Z01.419 WELL WOMAN EXAM WITH ROUTINE GYNECOLOGICAL EXAM: Primary | ICD-10-CM

## 2022-12-07 DIAGNOSIS — Z91.89 INCREASED RISK OF BREAST CANCER: ICD-10-CM

## 2022-12-07 DIAGNOSIS — Z30.44 ENCOUNTER FOR SURVEILLANCE OF VAGINAL RING HORMONAL CONTRACEPTIVE DEVICE: ICD-10-CM

## 2022-12-07 PROCEDURE — 99395 PREV VISIT EST AGE 18-39: CPT | Performed by: OBSTETRICS & GYNECOLOGY

## 2022-12-07 PROCEDURE — G0123 SCREEN CERV/VAG THIN LAYER: HCPCS | Performed by: OBSTETRICS & GYNECOLOGY

## 2022-12-07 PROCEDURE — 87624 HPV HI-RISK TYP POOLED RSLT: CPT | Performed by: OBSTETRICS & GYNECOLOGY

## 2022-12-07 RX ORDER — ETONOGESTREL AND ETHINYL ESTRADIOL 11.7; 2.7 MG/1; MG/1
INSERT, EXTENDED RELEASE VAGINAL
Qty: 1 EACH | Refills: 0 | Status: SHIPPED | OUTPATIENT
Start: 2022-12-07 | End: 2023-01-24 | Stop reason: SDUPTHER

## 2022-12-07 NOTE — ASSESSMENT & PLAN NOTE
Pt had abnormal findings on 3/2022 and had subsequent diagnostic mamm and ultrasound.  Due for screening MRI bilateral breast 3/2023 and screening bilateral mammogram 11/2023

## 2022-12-16 LAB
CYTOLOGIST CVX/VAG CYTO: NORMAL
CYTOLOGY CVX/VAG DOC CYTO: NORMAL
CYTOLOGY CVX/VAG DOC THIN PREP: NORMAL
DX ICD CODE: NORMAL
HIV 1 & 2 AB SER-IMP: NORMAL
HPV I/H RISK 4 DNA CVX QL PROBE+SIG AMP: NEGATIVE
OTHER STN SPEC: NORMAL
STAT OF ADQ CVX/VAG CYTO-IMP: NORMAL

## 2023-01-03 ENCOUNTER — IMMUNIZATION (OUTPATIENT)
Dept: INTERNAL MEDICINE | Facility: CLINIC | Age: 38
End: 2023-01-03
Payer: COMMERCIAL

## 2023-01-03 ENCOUNTER — LAB (OUTPATIENT)
Dept: LAB | Facility: HOSPITAL | Age: 38
End: 2023-01-03
Payer: COMMERCIAL

## 2023-01-03 DIAGNOSIS — R79.89 ELEVATED FERRITIN: ICD-10-CM

## 2023-01-03 DIAGNOSIS — K76.0 FATTY LIVER: ICD-10-CM

## 2023-01-03 DIAGNOSIS — R79.89 ELEVATED LFTS: ICD-10-CM

## 2023-01-03 LAB
ALBUMIN SERPL-MCNC: 4.3 G/DL (ref 3.5–5.2)
ALP SERPL-CCNC: 52 U/L (ref 39–117)
ALT SERPL W P-5'-P-CCNC: 83 U/L (ref 1–33)
AST SERPL-CCNC: 66 U/L (ref 1–32)
BILIRUB CONJ SERPL-MCNC: <0.2 MG/DL (ref 0–0.3)
BILIRUB INDIRECT SERPL-MCNC: ABNORMAL MG/DL
BILIRUB SERPL-MCNC: <0.2 MG/DL (ref 0–1.2)
FERRITIN SERPL-MCNC: 718 NG/ML (ref 13–150)
INR PPP: 0.94 (ref 0.86–1.15)
IRON 24H UR-MRATE: 73 MCG/DL (ref 37–145)
IRON SATN MFR SERPL: 14 % (ref 20–50)
PROT SERPL-MCNC: 7.8 G/DL (ref 6–8.5)
PROTHROMBIN TIME: 12.7 SECONDS (ref 11.8–14.9)
TIBC SERPL-MCNC: 532 MCG/DL (ref 298–536)
TRANSFERRIN SERPL-MCNC: 357 MG/DL (ref 200–360)

## 2023-01-03 PROCEDURE — 91312 COVID-19 (PFIZER) BIVALENT BOOSTER 12+YRS: CPT | Performed by: NURSE PRACTITIONER

## 2023-01-03 PROCEDURE — 36415 COLL VENOUS BLD VENIPUNCTURE: CPT

## 2023-01-03 PROCEDURE — 83540 ASSAY OF IRON: CPT

## 2023-01-03 PROCEDURE — 0124A COVID-19 (PFIZER) BIVALENT BOOSTER 12+YRS: CPT | Performed by: NURSE PRACTITIONER

## 2023-01-03 PROCEDURE — 84466 ASSAY OF TRANSFERRIN: CPT

## 2023-01-03 PROCEDURE — 82728 ASSAY OF FERRITIN: CPT

## 2023-01-03 PROCEDURE — 80076 HEPATIC FUNCTION PANEL: CPT

## 2023-01-03 PROCEDURE — 85610 PROTHROMBIN TIME: CPT

## 2023-01-18 ENCOUNTER — OFFICE VISIT (OUTPATIENT)
Dept: INTERNAL MEDICINE | Facility: CLINIC | Age: 38
End: 2023-01-18
Payer: COMMERCIAL

## 2023-01-18 VITALS
OXYGEN SATURATION: 98 % | TEMPERATURE: 97.8 F | DIASTOLIC BLOOD PRESSURE: 86 MMHG | BODY MASS INDEX: 30.79 KG/M2 | WEIGHT: 173.8 LBS | HEART RATE: 75 BPM | SYSTOLIC BLOOD PRESSURE: 130 MMHG

## 2023-01-18 DIAGNOSIS — R50.9 FEVER, UNSPECIFIED FEVER CAUSE: ICD-10-CM

## 2023-01-18 DIAGNOSIS — J02.9 SORE THROAT: ICD-10-CM

## 2023-01-18 DIAGNOSIS — J06.9 ACUTE URI: Primary | ICD-10-CM

## 2023-01-18 LAB
EXPIRATION DATE: NORMAL
EXPIRATION DATE: NORMAL
FLUAV AG UPPER RESP QL IA.RAPID: NOT DETECTED
FLUBV AG UPPER RESP QL IA.RAPID: NOT DETECTED
INTERNAL CONTROL: NORMAL
INTERNAL CONTROL: NORMAL
Lab: NORMAL
Lab: NORMAL
S PYO AG THROAT QL: NEGATIVE
SARS-COV-2 AG UPPER RESP QL IA.RAPID: NOT DETECTED

## 2023-01-18 PROCEDURE — 99213 OFFICE O/P EST LOW 20 MIN: CPT | Performed by: NURSE PRACTITIONER

## 2023-01-18 PROCEDURE — 87880 STREP A ASSAY W/OPTIC: CPT | Performed by: NURSE PRACTITIONER

## 2023-01-18 PROCEDURE — 87428 SARSCOV & INF VIR A&B AG IA: CPT | Performed by: NURSE PRACTITIONER

## 2023-01-18 RX ORDER — TIZANIDINE 2 MG/1
2 TABLET ORAL NIGHTLY PRN
Qty: 60 TABLET | Refills: 1 | Status: SHIPPED | OUTPATIENT
Start: 2023-01-18

## 2023-01-18 NOTE — PROGRESS NOTES
Chief Complaint  Earache (right), Sore Throat (Kids had strep last week, still taking meds), and Fever (Unknown but has been warm, she took over the counter cold/flu meds this morning)    Subjective          Minh Drake presents to [facility]  History of Present Illness  She reports waking this am with sore throat. She has right ear pain as well. Her children had strep last week. Her  also has new sx.  She denies vomiting, fever, chills.  Took cold and sinus medication today    She is also having bilateral arm pain, muscle soreness. She has been doing more work recently. Feels like her arms are overworked  Objective   Vital Signs:   /86   Pulse 75   Temp 97.8 °F (36.6 °C)   Wt 78.8 kg (173 lb 12.8 oz)   SpO2 98%   BMI 30.79 kg/m²     Physical Exam  Vitals and nursing note reviewed.   Constitutional:       General: She is not in acute distress.     Appearance: Normal appearance.   HENT:      Head: Normocephalic and atraumatic.      Right Ear: Tympanic membrane, ear canal and external ear normal.      Left Ear: Tympanic membrane, ear canal and external ear normal.      Nose: Rhinorrhea present. No congestion.      Mouth/Throat:      Mouth: Mucous membranes are moist.      Pharynx: No oropharyngeal exudate or posterior oropharyngeal erythema.   Eyes:      Conjunctiva/sclera: Conjunctivae normal.   Cardiovascular:      Rate and Rhythm: Normal rate and regular rhythm.      Pulses: Normal pulses.      Heart sounds: Normal heart sounds. No murmur heard.    No friction rub. No gallop.   Pulmonary:      Effort: Pulmonary effort is normal. No respiratory distress.      Breath sounds: No wheezing, rhonchi or rales.   Musculoskeletal:      Cervical back: Neck supple.   Lymphadenopathy:      Cervical: No cervical adenopathy.   Skin:     General: Skin is warm and dry.   Neurological:      General: No focal deficit present.      Mental Status: She is alert and  oriented to person, place, and time.   Psychiatric:         Mood and Affect: Mood normal.         Behavior: Behavior normal.        Result Review :          Procedures      Assessment and Plan    Diagnoses and all orders for this visit:    1. Acute URI (Primary)  Comments:  discussed likely viral illness including course and supportive care. push fluids, rest. negative flu, covid, and strep    2. Fever, unspecified fever cause  -     POCT SARS-CoV-2 Antigen SERGE    3. Sore throat  -     POC Rapid Strep A    Other orders  -     diclofenac (VOLTAREN) 50 MG EC tablet; Take 1 tablet by mouth 2 (Two) Times a Day As Needed (pain).  Dispense: 60 tablet; Refill: 2  -     tiZANidine (ZANAFLEX) 2 MG tablet; Take 1 tablet by mouth At Night As Needed for Muscle Spasms.  Dispense: 60 tablet; Refill: 1              Follow Up   No follow-ups on file.  Patient was given instructions and counseling regarding her condition or for health maintenance advice. Please see specific information pulled into the AVS if appropriate.

## 2023-01-24 ENCOUNTER — TELEPHONE (OUTPATIENT)
Dept: OBSTETRICS AND GYNECOLOGY | Facility: CLINIC | Age: 38
End: 2023-01-24
Payer: COMMERCIAL

## 2023-01-24 DIAGNOSIS — Z30.44 ENCOUNTER FOR SURVEILLANCE OF VAGINAL RING HORMONAL CONTRACEPTIVE DEVICE: ICD-10-CM

## 2023-01-24 RX ORDER — ETONOGESTREL AND ETHINYL ESTRADIOL 11.7; 2.7 MG/1; MG/1
INSERT, EXTENDED RELEASE VAGINAL
Qty: 1 EACH | Refills: 11 | Status: SHIPPED | OUTPATIENT
Start: 2023-01-24

## 2023-03-02 ENCOUNTER — OFFICE VISIT (OUTPATIENT)
Dept: INTERNAL MEDICINE | Facility: CLINIC | Age: 38
End: 2023-03-02
Payer: COMMERCIAL

## 2023-03-02 VITALS
WEIGHT: 176 LBS | HEIGHT: 63 IN | TEMPERATURE: 98 F | BODY MASS INDEX: 31.18 KG/M2 | HEART RATE: 94 BPM | OXYGEN SATURATION: 97 % | SYSTOLIC BLOOD PRESSURE: 110 MMHG | DIASTOLIC BLOOD PRESSURE: 68 MMHG | RESPIRATION RATE: 18 BRPM

## 2023-03-02 DIAGNOSIS — M54.2 NECK PAIN: Primary | ICD-10-CM

## 2023-03-02 DIAGNOSIS — M79.602 LEFT ARM PAIN: ICD-10-CM

## 2023-03-02 PROCEDURE — 99213 OFFICE O/P EST LOW 20 MIN: CPT | Performed by: NURSE PRACTITIONER

## 2023-03-02 RX ORDER — FLUTICASONE PROPIONATE 50 MCG
2 SPRAY, SUSPENSION (ML) NASAL DAILY
Qty: 16 G | Refills: 5 | Status: SHIPPED | OUTPATIENT
Start: 2023-03-02

## 2023-03-02 RX ORDER — ERGOCALCIFEROL 1.25 MG/1
50000 CAPSULE ORAL WEEKLY
Qty: 13 CAPSULE | Refills: 1 | Status: SHIPPED | OUTPATIENT
Start: 2023-03-02

## 2023-03-02 NOTE — PROGRESS NOTES
"Chief Complaint  Arm Pain (Bilateral lower arm pain- 1 month) and Abdominal Pain (Lightening type of pain- 2 nights ago)    Subjective          Minh Drake presents to [facility]  History of Present Illness  Started last week. Constant muscle pain in left arm ongoing. Now having nerve pain. Radiates in left forearm pain. Went to the chiropractor for other muscle pain. She reports recently long car ride for a few hours prior to onset of pain. She reports pain came while she was lying down on her back. Lasted 2 days ago and then resolved after 2-3 nights.  Taking muscle relaxer and nsaids  Reports occasional pain in neck, most often at night  Denies numbness/tingling  Seeing chiropractor-father in law in [place]    She reports having issues at work  Less work stress  Still does not wish to see counselor at this time but reports having handout  Objective   Vital Signs:   /68 (BP Location: Right arm, Patient Position: Sitting, Cuff Size: Adult)   Pulse 94   Temp 98 °F (36.7 °C)   Resp 18   Ht 160 cm (63\")   Wt 79.8 kg (176 lb)   SpO2 97%   BMI 31.18 kg/m²     Physical Exam  Vitals and nursing note reviewed.   Constitutional:       General: She is not in acute distress.     Appearance: Normal appearance.   HENT:      Head: Normocephalic and atraumatic.      Right Ear: External ear normal.      Left Ear: External ear normal.      Nose: Nose normal.      Mouth/Throat:      Mouth: Mucous membranes are moist.   Eyes:      Conjunctiva/sclera: Conjunctivae normal.   Cardiovascular:      Rate and Rhythm: Normal rate and regular rhythm.      Pulses: Normal pulses.      Heart sounds: Normal heart sounds. No murmur heard.    No friction rub. No gallop.   Pulmonary:      Effort: Pulmonary effort is normal. No respiratory distress.      Breath sounds: No wheezing, rhonchi or rales.   Musculoskeletal:      Left elbow: Normal. Normal range of motion. No tenderness.      Left " forearm: Normal.      Cervical back: Neck supple.      Right lower leg: No edema.      Left lower leg: No edema.      Comments: Pain not reproducible with supination/pronation   Skin:     General: Skin is warm and dry.   Neurological:      General: No focal deficit present.      Mental Status: She is alert and oriented to person, place, and time.   Psychiatric:         Mood and Affect: Mood normal.         Behavior: Behavior normal.        Result Review :          Procedures      Assessment and Plan    Diagnoses and all orders for this visit:    1. Neck pain (Primary)  Comments:  will get xray and send for PT. consider further imaging if not improving  Orders:  -     XR Spine Cervical Complete 4 or 5 View (In Office)  -     Ambulatory Referral to Physical Therapy Evaluate and treat    2. Left arm pain  Comments:  neck xray and PT. consider EMG if not imrpoving   Orders:  -     XR Spine Cervical Complete 4 or 5 View (In Office)  -     Ambulatory Referral to Physical Therapy Evaluate and treat    Other orders  -     vitamin D (ERGOCALCIFEROL) 1.25 MG (94010 UT) capsule capsule; Take 1 capsule by mouth 1 (One) Time Per Week.  Dispense: 13 capsule; Refill: 1  -     fluticasone (FLONASE) 50 MCG/ACT nasal spray; 2 sprays by Each Nare route Daily.  Dispense: 16 g; Refill: 5              Follow Up   Return in about 3 months (around 6/2/2023).  Patient was given instructions and counseling regarding her condition or for health maintenance advice. Please see specific information pulled into the AVS if appropriate.

## 2023-03-08 ENCOUNTER — HOSPITAL ENCOUNTER (OUTPATIENT)
Dept: MRI IMAGING | Facility: HOSPITAL | Age: 38
Discharge: HOME | End: 2023-03-08
Admitting: OBSTETRICS & GYNECOLOGY
Payer: COMMERCIAL

## 2023-03-08 DIAGNOSIS — Z91.89 INCREASED RISK OF BREAST CANCER: ICD-10-CM

## 2023-03-08 PROCEDURE — A9577 INJ MULTIHANCE: HCPCS | Performed by: OBSTETRICS & GYNECOLOGY

## 2023-03-08 PROCEDURE — 77049 MRI BREAST C-+ W/CAD BI: CPT

## 2023-03-08 PROCEDURE — 0 GADOBENATE DIMEGLUMINE 529 MG/ML SOLUTION: Performed by: OBSTETRICS & GYNECOLOGY

## 2023-03-08 RX ADMIN — GADOBENATE DIMEGLUMINE 15 ML: 529 INJECTION, SOLUTION INTRAVENOUS at 09:54

## 2023-03-30 ENCOUNTER — TREATMENT (OUTPATIENT)
Dept: PHYSICAL THERAPY | Facility: CLINIC | Age: 38
End: 2023-03-30
Payer: COMMERCIAL

## 2023-03-30 DIAGNOSIS — R29.898 BILATERAL ARM WEAKNESS: ICD-10-CM

## 2023-03-30 DIAGNOSIS — M54.2 CERVICALGIA OF OCCIPITO-ATLANTO-AXIAL REGION: Primary | ICD-10-CM

## 2023-03-30 DIAGNOSIS — M53.82 NECK MUSCLE WEAKNESS: ICD-10-CM

## 2023-03-30 PROCEDURE — 97110 THERAPEUTIC EXERCISES: CPT

## 2023-03-30 PROCEDURE — 97140 MANUAL THERAPY 1/> REGIONS: CPT

## 2023-03-30 PROCEDURE — 97161 PT EVAL LOW COMPLEX 20 MIN: CPT

## 2023-03-30 NOTE — PROGRESS NOTES
Physical Therapy Initial Evaluation and Plan of Care      [place] [facility]: [address] [place] [address]      Patient: Minh Drake   : 1985  Diagnosis/ICD-10 Code:  Cervicalgia of jxsllqsf-hwpmaxs-mqxkw region [M54.2]  Referring practitioner: KYUNG Thapa  Date of Initial Visit: 3/30/2023  Today's Date: 3/30/2023  Patient seen for 1 sessions           Subjective Questionnaire: NDI:      Subjective   Pt reports their neck and upper thoracic pain which creates headaches that are coupled w/ nausea. Pt reports they did have some nerve pain, but this has since stopped. Pt reports they have used chiro to help, and they did see them yesterday. Pt has been using medication to manage these symptoms. Pt reports this seems to come on when they are working with computer screens. Pt reports some light sensitivity, and they take out their contacts at times to help with this. Pt does not describe their symptoms as much as pain as it is a stiffness which creates a headache. Pt also struggles w/ migraines, motion sickness, and sinus issues as well.         Pt reports they can get nauseated without having a headache.       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     Current Pain: 2-3/10  Best Pain: 0/10  Quality of pain: nausea, pain,     Aggravating Factors: working, cell phone,   Easing Factors: massage pillow, heat, medication,     Past Medical Hx: migraine headaches, sinus trouble, currently having their liver monitored. Denies head, neck, and back trauma.       Objective          Palpation   Left   Tenderness of the suboccipitals and upper trapezius.     Right Tenderness of the suboccipitals and upper trapezius.     Additional Palpation Details  Pt reports their location of 'nausea' comes from R upper trap. Pt has pain w/ B suboccipital palpation.     Tenderness   Cervical Spine   Tenderness in the left 1st rib.   No tenderness in the right 1st rib.     Additional  Tenderness Details  Pt has joint stiffness throughout their upper, middle, and lower cervical spine.     Neurological Testing     Additional Neurological Details  Used to have a sharp pain, now they are no longer having these symptoms.     Pt has intact light touch sensation in B UEs consistent w/ C4-T2 dermatomes.     Active Range of Motion   Cervical/Thoracic Spine   Cervical    Flexion: 52 degrees with pain  Extension: 60 degrees   Left lateral flexion: 57 degrees   Right lateral flexion: 50 degrees with pain  Left rotation: 69 degrees with pain  Right rotation: 65 degrees     Additional Active Range of Motion Details  Pt has some pain w/ thoracic rotation but this is not considered their familiar symptoms. Pt does have familiar symptoms w/ cervical motion.     Strength/Myotome Testing     Left Shoulder     Planes of Motion   Flexion: 4   Abduction: 4+   External rotation at 0°: 5     Right Shoulder     Planes of Motion   Flexion: 4   Abduction: 4+   External rotation at 0°: 5   Internal rotation at 0°: 5     Left Elbow   Flexion: 5  Extension: 5    Right Elbow   Flexion: 5  Extension: 5      See Exercise, Manual, and Modality Logs for complete treatment.       Assessment & Plan     Assessment  Impairments: abnormal coordination, activity intolerance, impaired physical strength, lacks appropriate home exercise program and pain with function  Functional Limitations: sleeping, pushing, uncomfortable because of pain, sitting and unable to perform repetitive tasks  Assessment details: Pt reports to physical therapy w/ complaints of headaches w/ associated nausea. Pt presents w/ decreased strength as well as some joint stiffness in their cervical spine. Pt does have some familiar headache symptom w/ palpation to their suboccipital region. Pt also reports some nausea symptoms w/ R upper trapezius palpation. Pt educated on their current HEP, as well as their sleeping posture and work posture. Pt will benefit from  skilled physical therapy to address their current impairments and limitations in order to perform daily tasks and work task with a decreased frequency of headaches and nausea.   Prognosis: good    Goals  Plan Goals: Plan Goals: Headache and neck pain:    1. The patient complains of neck pain/headache.  LTG 1: 12 weeks:  The patient will report a pain rating of 1/10 or better in order to improve tolerance to activities of daily living and perform computer work tasks.  STATUS:  New  STG 1a: 6 weeks:  The patient will report a pain rating of 2/10 or better consistently.  STATUS:  New  TREATMENT:  Therapeutic exercises, manual therapy, aquatic therapy, home exercise   instruction, and modalities as needed for pain to include:  electrical stimulation, moist heat, and ice.    2. The patient has limited strength of the B shoulder.  LTG 2: 12 weeks:  The patient will demonstrate 5 /5 strength for B shoulder flexion, abduction, external rotation, and internal rotation in order to demonstrate improved shoulder stability.  STATUS:  New  STG 2a: 6 weeks:  The patient will demonstrate 4+ /5 strength for B shoulder flexion, abduction, external rotation, and internal rotation.  STATUS:  New  STG2b:  6 weeks:  The patient will be independent with home exercises.   STATUS:  New  TREATMENT: Manual therapy, therapeutic exercise, home exercise instruction, and modalities as needed to include: electrical stimulation, moist heat, and ice.    3.The patient has limited strength of cervical deep neck flexors.  LTG 3: 12 weeks:  The patient will demonstrate the ability to sustain deep neck flexors without substitution for 30 seconds in supine for improvement in neck stability.  STATUS:  New  STG 3a: 6 weeks:  The patient will demonstrate the ability to sustain deep neck flexors without substitution for 15 seconds in supine for improvement in neck stability.  STATUS:  New  TREATMENT: Manual therapy, therapeutic exercise, home exercise  instruction, and modalities as needed to include: electrical stimulation, ultrasound, moist heat, and ice.    4. Mobility: Carrying/turning head/driving         LTG 4: 12 weeks:  The patient will demonstrate 16 % limitation by achieving a score of 8 on the NDI.  STATUS:  New  STG 4 a: 6 weeks:  The patient will demonstrate 24 % limitation by achieving a score of 12 on the NDI.    STATUS:  New  TREATMENT:  Manual therapy, therapeutic exercise, home exercise instruction, and modalities as needed to include: moist heat, electrical stimulation, and ultrasound.           PLAN:  Therapy options: will receive skilled therapy services  Planned modality interventions: Cryotherapy, Heat and TENS  Planned therapy interventions:ADL retraining, soft tissue mobilization, strengthening, stretching, therapeutic activities, manual therapy, joint mobilization, home exercise program/patient education, functional ROM exercises, flexibility, body mechanics training, postural training and neuromuscular re-education  Frequency: 2x per week  Duration in weeks: 12  Treatment plan discussed with: patient      Visit Diagnoses:    ICD-10-CM ICD-9-CM   1. Cervicalgia of gztoervq-yvkolwa-gnidd region  M54.2 723.1   2. Bilateral arm weakness  R29.898 729.89   3. Neck muscle weakness  M53.82 723.9       History # of Personal Factors and/or Comorbidities: LOW (0)  Examination of Body System(s): # of elements: LOW (1-2)  Clinical Presentation: STABLE   Clinical Decision Making: LOW       Timed:         Manual Therapy:    10     mins  58544;     Therapeutic Exercise:    15     mins  43216;     Neuromuscular Alo:    0    mins  16939;    Therapeutic Activity:     0     mins  78717;     Gait Trainin     mins  79507;     Ultrasound:     0     mins  10687;    Ionto                               0    mins   21154  Self Care                       0     mins   19626  Canalith Repos    0     mins 81475      Un-Timed:  Electrical Stimulation:    0      mins  48469 ( );  Dry Needling     0     mins self-pay  Traction     0     mins 52664  Low Eval     30     Mins  66734  Mod Eval     0     Mins  93484  High Eval                       0     Mins  78739  Re-Eval                           0    mins  28671    Timed Treatment:   25   mins   Total Treatment:     55   mins    PT SIGNATURE: Bryson Matos PT, DPT    Electronically signed 3/30/2023    [place] License: PT - [id number]    Initial Certification  Certification Period: 3/30/2023 thru 6/27/2023  I certify that the therapy services are furnished while this patient is under my care.  The services outlined above are required by this patient, and will be reviewed every 90 days.     PHYSICIAN: Jackelyn Helton APRN  NPI: [id number]      DATE:     Please sign and return via fax to [phone]. Thank you, [facility].

## 2023-04-03 ENCOUNTER — TREATMENT (OUTPATIENT)
Dept: PHYSICAL THERAPY | Facility: CLINIC | Age: 38
End: 2023-04-03
Payer: COMMERCIAL

## 2023-04-03 DIAGNOSIS — M53.82 NECK MUSCLE WEAKNESS: ICD-10-CM

## 2023-04-03 DIAGNOSIS — R29.898 BILATERAL ARM WEAKNESS: ICD-10-CM

## 2023-04-03 DIAGNOSIS — M54.2 CERVICALGIA OF OCCIPITO-ATLANTO-AXIAL REGION: Primary | ICD-10-CM

## 2023-04-03 PROCEDURE — 97530 THERAPEUTIC ACTIVITIES: CPT

## 2023-04-03 PROCEDURE — 97140 MANUAL THERAPY 1/> REGIONS: CPT

## 2023-04-03 PROCEDURE — 97110 THERAPEUTIC EXERCISES: CPT

## 2023-04-03 NOTE — PROGRESS NOTES
Physical Therapy Daily Treatment Note  [place] [facility]: [address]   [place] [address]      Patient: Minh Drake   : 1985  Diagnosis/ICD-10 Code:  Cervicalgia of zkfsyhuu-aswdryd-movog region [M54.2]  Referring practitioner: KYUNG Thapa  Date of Initial Visit: Type: THERAPY  Noted: 3/30/2023  Today's Date: 4/3/2023  Patient seen for 2 sessions           Subjective   The patient reported they are not experiencing a headache today. Pt reports they have not gotten their blue light lenses yet. Pt fell asleep with their massager pillow last night, but the positioning was good for their neck.     Objective   See Exercise, Manual, and Modality Logs for complete treatment.     Assessment/Plan  Pt has increased stiffness and pain in their upper thoracic spine. Pt tolerates manual therapy well today. Pt provided a print out of their updated HEP.  Patient will continue to benefit from skilled physical therapy to further address their deficits in strength and ROM in order to improve upon their functional mobility and to meet their personal goals.          Timed:  Manual Therapy:    14     mins  78928;  Therapeutic Exercise:    8     mins  84006;     Neuromuscular Alo:   0    mins  25358;    Therapeutic Activity:     9     mins  83249;     Gait Trainin     mins  23501;     Aquatics                         0      mins  86366    Un-timed:  Mechanical Traction      0     mins  14447  Electrical Stimulation:    0     mins  49871 ( );      Timed Treatment:   31   mins   Total Treatment:     31   mins    Bryson Matos PT, DPT    Electronically signed 4/3/2023    [place] License: PT - [id number]

## 2023-04-05 ENCOUNTER — TREATMENT (OUTPATIENT)
Dept: PHYSICAL THERAPY | Facility: CLINIC | Age: 38
End: 2023-04-05
Payer: COMMERCIAL

## 2023-04-05 DIAGNOSIS — M54.2 CERVICALGIA OF OCCIPITO-ATLANTO-AXIAL REGION: Primary | ICD-10-CM

## 2023-04-05 DIAGNOSIS — M53.82 NECK MUSCLE WEAKNESS: ICD-10-CM

## 2023-04-05 DIAGNOSIS — R29.898 BILATERAL ARM WEAKNESS: ICD-10-CM

## 2023-04-05 PROCEDURE — 97530 THERAPEUTIC ACTIVITIES: CPT

## 2023-04-05 PROCEDURE — 97140 MANUAL THERAPY 1/> REGIONS: CPT

## 2023-04-05 NOTE — PROGRESS NOTES
Physical Therapy Daily Treatment Note  [place] [facility] [address] [place] [address]    Patient: Minh Noelle   : 1985  Referring practitioner: KYUNG Thapa  Date of Initial Visit: Type: THERAPY  Noted: 3/30/2023  Today's Date: 2023  Patient seen for 3 sessions           Subjective  Minh Inserra reports: she isn't really hurting much today. Minh denies having an headache. During care, she explained thast she does see a massage therapist.       Objective   See Exercise, Manual, and Modality Logs for complete treatment.       Assessment/Plan  PTA educated in postural awareness and use of mirror during full session today to assure correct perfance of stretches.Minh noted she understands the need to allow her body to move and not hold herself so rigidly. Continue per outlined POC to attend to deficits outlined.     Visit Diagnoses:    ICD-10-CM ICD-9-CM   1. Cervicalgia of hykysmja-kuqofst-ulbhr region  M54.2 723.1   2. Bilateral arm weakness  R29.898 729.89   3. Neck muscle weakness  M53.82 723.9       Progress per Plan of Care and Progress strengthening /stabilization /functional activity           Timed:  Manual Therapy:    14     mins  38155;  Therapeutic Exercise:         mins  26477;     Neuromuscular Alo:        mins  43810;    Therapeutic Activity:     12     mins  44768;     Gait Training:           mins  79286;     Ultrasound:          mins  68716;    Electrical Stimulation:         mins  78902 ( );  Aquatics  __   mins   20272    Untimed:  Electrical Stimulation:         mins  99779 ( );  Mechanical Traction:         mins  94655;     Timed Treatment:  26    mins   Total Treatment:    26    mins    Electronically Signed:  Micaela Ulloa PTA  Physical Therapist Assistant    [place] PTA license [id number]

## 2023-04-12 ENCOUNTER — TREATMENT (OUTPATIENT)
Dept: PHYSICAL THERAPY | Facility: CLINIC | Age: 38
End: 2023-04-12
Payer: COMMERCIAL

## 2023-04-12 DIAGNOSIS — R29.898 BILATERAL ARM WEAKNESS: ICD-10-CM

## 2023-04-12 DIAGNOSIS — M54.2 CERVICALGIA OF OCCIPITO-ATLANTO-AXIAL REGION: Primary | ICD-10-CM

## 2023-04-12 DIAGNOSIS — M53.82 NECK MUSCLE WEAKNESS: ICD-10-CM

## 2023-04-12 PROCEDURE — 97140 MANUAL THERAPY 1/> REGIONS: CPT

## 2023-04-12 PROCEDURE — 97530 THERAPEUTIC ACTIVITIES: CPT

## 2023-04-12 PROCEDURE — 97110 THERAPEUTIC EXERCISES: CPT

## 2023-04-12 NOTE — PROGRESS NOTES
"Physical Therapy Daily Treatment Note  [place] [facility] [address] [place] [address]    Patient: Minh Drake   : 1985  Referring practitioner: KYUNG Thapa  Date of Initial Visit: Type: THERAPY  Noted: 3/30/2023  Today's Date: 2023  Patient seen for 4 sessions           Subjective  Minh Noelle reports: no headache. 'I have been doing the stretches and did exercise my legs.\" Minh related she and the family returned from a short trip to [place] where she did a lot of walking.        Objective   See Exercise, Manual, and Modality Logs for complete treatment.       Assessment/Plan    Visit Diagnoses:    ICD-10-CM ICD-9-CM   1. Cervicalgia of wtxlvtzc-mdjcflr-aohiz region  M54.2 723.1   2. Bilateral arm weakness  R29.898 729.89   3. Neck muscle weakness  M53.82 723.9       Progress per Plan of Care and Progress strengthening /stabilization /functional activity           Timed:  Manual Therapy:   8      mins  34349;  Therapeutic Exercise:    13     mins  55143;     Neuromuscular Alo:        mins  03937;    Therapeutic Activity:     10     mins  20661;     Gait Training:           mins  38736;     Ultrasound:          mins  12612;    Electrical Stimulation:         mins  71000 ( );  Aquatics  __   mins   90365    Untimed:  Electrical Stimulation:         mins  54255 ( );  Mechanical Traction:         mins  08924;     Timed Treatment:   31   mins   Total Treatment:     31   mins    Electronically Signed:  Micaela Ulloa PTA  Physical Therapist Assistant    [place] PTA license [id number]            "

## 2023-04-17 ENCOUNTER — OFFICE VISIT (OUTPATIENT)
Dept: INTERNAL MEDICINE | Facility: CLINIC | Age: 38
End: 2023-04-17
Payer: COMMERCIAL

## 2023-04-17 VITALS
SYSTOLIC BLOOD PRESSURE: 108 MMHG | RESPIRATION RATE: 15 BRPM | HEART RATE: 88 BPM | TEMPERATURE: 98.6 F | DIASTOLIC BLOOD PRESSURE: 78 MMHG | WEIGHT: 171 LBS | BODY MASS INDEX: 30.3 KG/M2 | HEIGHT: 63 IN | OXYGEN SATURATION: 97 %

## 2023-04-17 DIAGNOSIS — R19.7 DIARRHEA, UNSPECIFIED TYPE: ICD-10-CM

## 2023-04-17 DIAGNOSIS — R10.13 EPIGASTRIC PAIN: Primary | ICD-10-CM

## 2023-04-17 LAB
ALBUMIN SERPL-MCNC: 4.5 G/DL (ref 3.5–5.2)
ALBUMIN/GLOB SERPL: 1.2 G/DL
ALP SERPL-CCNC: 50 U/L (ref 39–117)
ALT SERPL W P-5'-P-CCNC: 64 U/L (ref 1–33)
ANION GAP SERPL CALCULATED.3IONS-SCNC: 13.8 MMOL/L (ref 5–15)
AST SERPL-CCNC: 63 U/L (ref 1–32)
BASOPHILS # BLD AUTO: 0.07 10*3/MM3 (ref 0–0.2)
BASOPHILS NFR BLD AUTO: 0.9 % (ref 0–1.5)
BILIRUB SERPL-MCNC: <0.2 MG/DL (ref 0–1.2)
BUN SERPL-MCNC: 10 MG/DL (ref 6–20)
BUN/CREAT SERPL: 14.9 (ref 7–25)
CALCIUM SPEC-SCNC: 10.1 MG/DL (ref 8.6–10.5)
CHLORIDE SERPL-SCNC: 104 MMOL/L (ref 98–107)
CO2 SERPL-SCNC: 22.2 MMOL/L (ref 22–29)
CREAT SERPL-MCNC: 0.67 MG/DL (ref 0.57–1)
DEPRECATED RDW RBC AUTO: 40.4 FL (ref 37–54)
EGFRCR SERPLBLD CKD-EPI 2021: 116.3 ML/MIN/1.73
EOSINOPHIL # BLD AUTO: 0.26 10*3/MM3 (ref 0–0.4)
EOSINOPHIL NFR BLD AUTO: 3.5 % (ref 0.3–6.2)
ERYTHROCYTE [DISTWIDTH] IN BLOOD BY AUTOMATED COUNT: 12 % (ref 12.3–15.4)
GLOBULIN UR ELPH-MCNC: 3.7 GM/DL
GLUCOSE SERPL-MCNC: 86 MG/DL (ref 65–99)
HCT VFR BLD AUTO: 44 % (ref 34–46.6)
HGB BLD-MCNC: 14.6 G/DL (ref 12–15.9)
IMM GRANULOCYTES # BLD AUTO: 0.02 10*3/MM3 (ref 0–0.05)
IMM GRANULOCYTES NFR BLD AUTO: 0.3 % (ref 0–0.5)
LIPASE SERPL-CCNC: 35 U/L (ref 13–60)
LYMPHOCYTES # BLD AUTO: 2.5 10*3/MM3 (ref 0.7–3.1)
LYMPHOCYTES NFR BLD AUTO: 33.9 % (ref 19.6–45.3)
MCH RBC QN AUTO: 30.4 PG (ref 26.6–33)
MCHC RBC AUTO-ENTMCNC: 33.2 G/DL (ref 31.5–35.7)
MCV RBC AUTO: 91.5 FL (ref 79–97)
MONOCYTES # BLD AUTO: 0.74 10*3/MM3 (ref 0.1–0.9)
MONOCYTES NFR BLD AUTO: 10 % (ref 5–12)
NEUTROPHILS NFR BLD AUTO: 3.78 10*3/MM3 (ref 1.7–7)
NEUTROPHILS NFR BLD AUTO: 51.4 % (ref 42.7–76)
NRBC BLD AUTO-RTO: 0 /100 WBC (ref 0–0.2)
PLATELET # BLD AUTO: 267 10*3/MM3 (ref 140–450)
PMV BLD AUTO: 9.9 FL (ref 6–12)
POTASSIUM SERPL-SCNC: 4.6 MMOL/L (ref 3.5–5.2)
PROT SERPL-MCNC: 8.2 G/DL (ref 6–8.5)
RBC # BLD AUTO: 4.81 10*6/MM3 (ref 3.77–5.28)
SODIUM SERPL-SCNC: 140 MMOL/L (ref 136–145)
WBC NRBC COR # BLD: 7.37 10*3/MM3 (ref 3.4–10.8)

## 2023-04-17 PROCEDURE — 80053 COMPREHEN METABOLIC PANEL: CPT | Performed by: PHYSICIAN ASSISTANT

## 2023-04-17 PROCEDURE — 85025 COMPLETE CBC W/AUTO DIFF WBC: CPT | Performed by: PHYSICIAN ASSISTANT

## 2023-04-17 PROCEDURE — 83690 ASSAY OF LIPASE: CPT | Performed by: PHYSICIAN ASSISTANT

## 2023-04-17 RX ORDER — DICYCLOMINE HYDROCHLORIDE 10 MG/1
10 CAPSULE ORAL
Qty: 40 CAPSULE | Refills: 0 | Status: SHIPPED | OUTPATIENT
Start: 2023-04-17 | End: 2023-04-27

## 2023-04-17 NOTE — PROGRESS NOTES
"Chief Complaint  Abdominal Pain and Diarrhea (Since yesterday)    Subjective          Minh Drake presents to [facility]  History of Present Illness  Abdominal pain- patient states that over the weekend she had a glass of wine and since then she has had non-stop diarrhea and abdominal pain.  She has taken some tylenol.  She has had low grade fever as well 100.1.  She has been trying to drink plenty of fluids and has tolerated some toast.  The abdominal pain seems to have subsided.  She has not taken any other medications at this time.  She has noticed more bloating in her upper abdomen.        Objective   Vital Signs:   /78   Pulse 88   Temp 98.6 °F (37 °C)   Resp 15   Ht 160 cm (63\")   Wt 77.6 kg (171 lb)   SpO2 97%   BMI 30.29 kg/m²     Physical Exam  Vitals reviewed.   Constitutional:       Appearance: Normal appearance. She is well-developed.   HENT:      Head: Normocephalic and atraumatic.   Eyes:      Conjunctiva/sclera: Conjunctivae normal.      Pupils: Pupils are equal, round, and reactive to light.   Cardiovascular:      Rate and Rhythm: Normal rate and regular rhythm.      Heart sounds: No murmur heard.    No friction rub. No gallop.   Pulmonary:      Effort: Pulmonary effort is normal.      Breath sounds: Normal breath sounds. No wheezing or rhonchi.   Abdominal:      Palpations: Abdomen is soft.      Tenderness: There is abdominal tenderness (epigastric). There is no guarding or rebound.      Comments: Negative Chin's sign   Skin:     General: Skin is warm and dry.   Neurological:      Mental Status: She is alert and oriented to person, place, and time.      Cranial Nerves: No cranial nerve deficit.   Psychiatric:         Mood and Affect: Mood and affect normal.         Behavior: Behavior normal.         Thought Content: Thought content normal.         Judgment: Judgment normal.        Result Review :          Procedures      Assessment " and Plan    Diagnoses and all orders for this visit:    1. Epigastric pain (Primary)  Assessment & Plan:  Could be acute viral gastroenteritis versus reaction to alcohol.  Will check labs for liver function, electrolytes and lipase.  Discussed with patient that since her symptoms are improving we will hold off on any type of imaging such as abdominal CT at this time.  Discussed with patient if her symptoms persist or worsen would suggest further investigation with CT or other workup.  Will send in Bentyl for abdominal cramping and discomfort.  Call for any questions or concerns.    Orders:  -     CBC w AUTO Differential  -     Comprehensive metabolic panel  -     Lipase  -     dicyclomine (BENTYL) 10 MG capsule; Take 1 capsule by mouth 4 (Four) Times a Day Before Meals & at Bedtime for 10 days.  Dispense: 40 capsule; Refill: 0    2. Diarrhea, unspecified type  -     dicyclomine (BENTYL) 10 MG capsule; Take 1 capsule by mouth 4 (Four) Times a Day Before Meals & at Bedtime for 10 days.  Dispense: 40 capsule; Refill: 0            Follow Up   Return if symptoms worsen or fail to improve.  Patient was given instructions and counseling regarding her condition or for health maintenance advice. Please see specific information pulled into the AVS if appropriate.

## 2023-04-17 NOTE — ASSESSMENT & PLAN NOTE
Could be acute viral gastroenteritis versus reaction to alcohol.  Will check labs for liver function, electrolytes and lipase.  Discussed with patient that since her symptoms are improving we will hold off on any type of imaging such as abdominal CT at this time.  Discussed with patient if her symptoms persist or worsen would suggest further investigation with CT or other workup.  Will send in Bentyl for abdominal cramping and discomfort.  Call for any questions or concerns.

## 2023-04-19 ENCOUNTER — TREATMENT (OUTPATIENT)
Dept: PHYSICAL THERAPY | Facility: CLINIC | Age: 38
End: 2023-04-19
Payer: COMMERCIAL

## 2023-04-19 DIAGNOSIS — M54.2 CERVICALGIA OF OCCIPITO-ATLANTO-AXIAL REGION: Primary | ICD-10-CM

## 2023-04-19 DIAGNOSIS — R29.898 BILATERAL ARM WEAKNESS: ICD-10-CM

## 2023-04-19 DIAGNOSIS — M53.82 NECK MUSCLE WEAKNESS: ICD-10-CM

## 2023-04-19 NOTE — PROGRESS NOTES
"Physical Therapy Daily Treatment Note  [facility] [address] [place] [address]    Patient: Minh Zhaotraci   : 1985  Referring practitioner: KYUNG Thapa  Date of Initial Visit: Type: THERAPY  Noted: 3/30/2023  Today's Date: 2023  Patient seen for 5 sessions           Subjective  Minh Insertraci reports:\" I am having other issues but I am okay for what I am seeing you for.\" When Minh as asked about her HEP, she related she hasn't done much due to stomach pain.     \"I saw the massage therapist yesterday. I do see progress.\"    Objective   See Exercise, Manual, and Modality Logs for complete treatment.       Assessment/Plan  Yanci reported progress with reduced pain undergoing massage therapy, seeing her father in law for chiropractic adjustments and the few P.T. sessions she has attended. She returns tomorrow for further strengthening program performance.     Visit Diagnoses:    ICD-10-CM ICD-9-CM   1. Cervicalgia of lwwtxwmh-mlkrgbs-djioy region  M54.2 723.1   2. Bilateral arm weakness  R29.898 729.89   3. Neck muscle weakness  M53.82 723.9       Progress per Plan of Care and Progress strengthening /stabilization /functional activity           Timed:  Manual Therapy:         mins  43010;  Therapeutic Exercise:    23     mins  02993;     Neuromuscular Alo:        mins  95145;    Therapeutic Activity:     8     mins  17306;     Gait Training:           mins  20753;     Ultrasound:          mins  06226;    Electrical Stimulation:         mins  97839 ( );  Aquatics  __   mins   32029    Untimed:  Electrical Stimulation:         mins  99786 ( );  Mechanical Traction:         mins  40408;     Timed Treatment:   31   mins   Total Treatment:     31   mins    Electronically Signed:  Micaela Ulloa PTA  Physical Therapist Assistant    [place] PTA license [id number]            "

## 2023-04-20 ENCOUNTER — TREATMENT (OUTPATIENT)
Dept: PHYSICAL THERAPY | Facility: CLINIC | Age: 38
End: 2023-04-20
Payer: COMMERCIAL

## 2023-04-20 ENCOUNTER — HOSPITAL ENCOUNTER (OUTPATIENT)
Dept: ULTRASOUND IMAGING | Facility: HOSPITAL | Age: 38
Discharge: HOME | End: 2023-04-20
Admitting: NURSE PRACTITIONER
Payer: COMMERCIAL

## 2023-04-20 DIAGNOSIS — R29.898 BILATERAL ARM WEAKNESS: ICD-10-CM

## 2023-04-20 DIAGNOSIS — R79.89 ELEVATED LFTS: ICD-10-CM

## 2023-04-20 DIAGNOSIS — M53.82 NECK MUSCLE WEAKNESS: ICD-10-CM

## 2023-04-20 DIAGNOSIS — M54.2 CERVICALGIA OF OCCIPITO-ATLANTO-AXIAL REGION: Primary | ICD-10-CM

## 2023-04-20 DIAGNOSIS — K76.0 FATTY LIVER: ICD-10-CM

## 2023-04-20 PROCEDURE — 76705 ECHO EXAM OF ABDOMEN: CPT

## 2023-04-20 NOTE — PROGRESS NOTES
"Physical Therapy Daily Treatment Note  [facility] [facility] [address] [place] [address]    Patient: Minh Drake   : 1985  Referring practitioner: KYUNG Thapa  Date of Initial Visit: Type: THERAPY  Noted: 3/30/2023  Today's Date: 2023  Patient seen for 6 sessions           Subjective  Minh Inserra reports: she is still experiencing stomach issues. She denies any of the cervical/UE issues.  She replied she is a workaholic when PTA asked about her many issues. \"I am trying to cut my hours.\"       Objective   See Exercise, Manual, and Modality Logs for complete treatment.       Assessment/Plan  Minh has progressed with her gross strength evident with increased resistance utilized and additional resistances. She c/o the horizontal ABD as terrible but after the completion of exercise she had no c/o. Continue per POC outlined.     Visit Diagnoses:    ICD-10-CM ICD-9-CM   1. Cervicalgia of cwxyydwn-nvlvxhd-jtxrf region  M54.2 723.1   2. Bilateral arm weakness  R29.898 729.89   3. Neck muscle weakness  M53.82 723.9       Progress per Plan of Care and Progress strengthening /stabilization /functional activity           Timed:  Manual Therapy:         mins  65585;  Therapeutic Exercise:    23     mins  34074;     Neuromuscular Alo:        mins  78281;    Therapeutic Activity:     8     mins  99571;     Gait Training:           mins  86989;     Ultrasound:          mins  82902;    Electrical Stimulation:         mins  08395 ( );  Aquatics  __   mins   43051    Untimed:  Electrical Stimulation:         mins  06982 ( );  Mechanical Traction:         mins  97237;     Timed Treatment:   31   mins   Total Treatment:     31   mins    Electronically Signed:  Micaela Ulloa PTA  Physical Therapist Assistant    [place] PTA license [id number]            "

## 2023-04-24 ENCOUNTER — TREATMENT (OUTPATIENT)
Dept: PHYSICAL THERAPY | Facility: CLINIC | Age: 38
End: 2023-04-24
Payer: COMMERCIAL

## 2023-04-24 DIAGNOSIS — M53.82 NECK MUSCLE WEAKNESS: ICD-10-CM

## 2023-04-24 DIAGNOSIS — R29.898 BILATERAL ARM WEAKNESS: ICD-10-CM

## 2023-04-24 DIAGNOSIS — M54.2 CERVICALGIA OF OCCIPITO-ATLANTO-AXIAL REGION: Primary | ICD-10-CM

## 2023-04-27 ENCOUNTER — TREATMENT (OUTPATIENT)
Dept: PHYSICAL THERAPY | Facility: CLINIC | Age: 38
End: 2023-04-27
Payer: COMMERCIAL

## 2023-04-27 DIAGNOSIS — R29.898 BILATERAL ARM WEAKNESS: ICD-10-CM

## 2023-04-27 DIAGNOSIS — M54.2 CERVICALGIA OF OCCIPITO-ATLANTO-AXIAL REGION: Primary | ICD-10-CM

## 2023-04-27 DIAGNOSIS — M53.82 NECK MUSCLE WEAKNESS: ICD-10-CM

## 2023-04-27 NOTE — PROGRESS NOTES
Progress Note / Discharge Summary   [facility] [address] [facility] [place] [address]      Patient: Minh Drake   : 1985  Diagnosis/ICD-10 Code:  Cervicalgia of izqjeysz-czkcpot-ijfll region [M54.2]  Referring practitioner: KYUNG Thapa  Date of Initial Visit: Type: THERAPY  Noted: 3/30/2023  Today's Date: 2023  Patient seen for 8 sessions       Subjective:   Subjective Questionnaire: NDI:  Clinical Progress: improved  Home Program Compliance: Yes  Treatment has included: ADL retraining, soft tissue mobilization, strengthening, stretching, therapeutic activities, manual therapy, joint mobilization, home exercise program/patient education, functional ROM exercises, flexibility, body mechanics training, postural training and neuromuscular re-education    Subjective   Pt feels as though therapy has helped out a lot and feel as though they do not have as much of a need for an adjustment, like they used to. Pt also reports they do not plan on continuing to schedule massage therapy as well. Pt reports they have been worried to pull w/ their theraband at home, due to fear of it not being secured.   Pt reports their current pain is a 1/10.       Objective   Active Range of Motion   Cervical/Thoracic Spine   Cervical     Flexion: 52 degrees with pain  Extension: 60 degrees   Left lateral flexion: 57 degrees   Right lateral flexion: 50 degrees with pain  Left rotation: 69 degrees with pain  Right rotation: 65 degrees      Strength/Myotome Testing     Left Shoulder      Planes of Motion   Flexion: 5   Abduction: 5   External rotation at 0°: 5     Right Shoulder      Planes of Motion   Flexion: 5   Abduction: 5   External rotation at 0°: 5   Internal rotation at 0°: 5      Left Elbow   Flexion: 5  Extension: 5     Right Elbow   Flexion: 5  Extension: 5        See Exercise, Manual, and Modality Logs for complete treatment.     Assessment/Plan   Pt reported to physical therapy w/ complaints of  headaches w/ associated nausea. Today, pt has decreased episodes of headaches as well as decrased nausea. Pt overall has been able to manage their pain w/ their HEP. Pt was further educated on how to perform HEP at home to alleviate fear of band being insecure in door way. Pt is receptive to different methods of connecting band for further band work. Pt has improvement in their strength and their perceived deficits described by NDI. Pt has met all of their goals in therapy. Pt will discharge from physical therapy at this time w/ their current HEP.       Goals  Plan Goals: Plan Goals: Headache and neck pain:    1. The patient complains of neck pain/headache.  LTG 1: 12 weeks:  The patient will report a pain rating of 1/10 or better in order to improve tolerance to activities of daily living and perform computer work tasks.  STATUS:  met  STG 1a: 6 weeks:  The patient will report a pain rating of 2/10 or better consistently.  STATUS:  met  TREATMENT:  Therapeutic exercises, manual therapy, aquatic therapy, home exercise   instruction, and modalities as needed for pain to include:  electrical stimulation, moist heat, and ice.    2. The patient has limited strength of the B shoulder.  LTG 2: 12 weeks:  The patient will demonstrate 5 /5 strength for B shoulder flexion, abduction, external rotation, and internal rotation in order to demonstrate improved shoulder stability.  STATUS:  met  STG 2a: 6 weeks:  The patient will demonstrate 4+ /5 strength for B shoulder flexion, abduction, external rotation, and internal rotation.  STATUS:  met  STG2b:  6 weeks:  The patient will be independent with home exercises.   STATUS:  met  TREATMENT: Manual therapy, therapeutic exercise, home exercise instruction, and modalities as needed to include: electrical stimulation, moist heat, and ice.    3.The patient has limited strength of cervical deep neck flexors.  LTG 3: 12 weeks:  The patient will demonstrate the ability to sustain deep  neck flexors without substitution for 30 seconds in supine for improvement in neck stability.  STATUS:  met  STG 3a: 6 weeks:  The patient will demonstrate the ability to sustain deep neck flexors without substitution for 15 seconds in supine for improvement in neck stability.  STATUS:  met  TREATMENT: Manual therapy, therapeutic exercise, home exercise instruction, and modalities as needed to include: electrical stimulation, ultrasound, moist heat, and ice.    4. Mobility: Carrying/turning head/driving         LTG 4: 12 weeks:  The patient will demonstrate 16 % limitation by achieving a score of 8 on the NDI.  STATUS:  met  STG 4 a: 6 weeks:  The patient will demonstrate 24 % limitation by achieving a score of 12 on the NDI.    STATUS:  met  TREATMENT:  Manual therapy, therapeutic exercise, home exercise instruction, and modalities as needed to include: moist heat, electrical stimulation, and ultrasound.   Progress toward previous goals: All Met      Recommendations: Discharge      PT Signature: Bryson Matos PT, DPT    Electronically signed 2023    [place] License: PT - [id number]         Timed:  Manual Therapy:    0     mins  40996;  Therapeutic Exercise:    23     mins  15163;     Neuromuscular Alo:    0    mins  13957;    Therapeutic Activity:     9     mins  72197;     Gait Trainin     mins  61440;     Aquatics                         0      mins  84693    Un-timed:  Mechanical Traction      0     mins  22068  Dry Needling     0     mins self-pay  Electrical Stimulation:    0     mins  35847 ( );    Timed Treatment:   32   mins   Total Treatment:     32   mins

## 2023-05-08 ENCOUNTER — OFFICE VISIT (OUTPATIENT)
Dept: GASTROENTEROLOGY | Facility: CLINIC | Age: 38
End: 2023-05-08
Payer: COMMERCIAL

## 2023-05-08 VITALS
SYSTOLIC BLOOD PRESSURE: 122 MMHG | DIASTOLIC BLOOD PRESSURE: 78 MMHG | HEIGHT: 63 IN | WEIGHT: 175.2 LBS | BODY MASS INDEX: 31.04 KG/M2 | HEART RATE: 103 BPM

## 2023-05-08 DIAGNOSIS — K76.0 FATTY LIVER: ICD-10-CM

## 2023-05-08 DIAGNOSIS — E66.9 CLASS 1 OBESITY WITH SERIOUS COMORBIDITY AND BODY MASS INDEX (BMI) OF 31.0 TO 31.9 IN ADULT, UNSPECIFIED OBESITY TYPE: ICD-10-CM

## 2023-05-08 DIAGNOSIS — R79.89 ELEVATED LFTS: Primary | ICD-10-CM

## 2023-05-08 PROCEDURE — 99214 OFFICE O/P EST MOD 30 MIN: CPT | Performed by: NURSE PRACTITIONER

## 2023-05-08 RX ORDER — NAPROXEN 375 MG/1
375 TABLET ORAL 2 TIMES DAILY PRN
COMMUNITY

## 2023-05-08 NOTE — PATIENT INSTRUCTIONS
[employer] for help w wt loss    Spoke with pt - she would like a refill of the Estradiol  IN regards to the Fosamax - she was prescribed this years ago, but never took it due to things she read about it, but pt is aware she has osteoporosis and wants to know if she should make an appt with [facility] to discuss it further  Please advise  Thanks!

## 2023-05-08 NOTE — PROGRESS NOTES
Patient Name: Minh Drake   Visit Date: 05/08/2023   Patient ID: [id number]  Provider: KYUNG Aguilar    Sex: female  Location:  Location Address:  Location Phone: [address] [place] [address] [phone]    YOB: 1985  Age: 37 y.o.   Primary Care Provider Jackelyn Helton APRN      Referring Provider: No ref. provider found        Chief Complaint  Elevated LFTs, Abdominal Pain (Epigastric pain started on 4.16.23, stopped on 4.19.23), and Diarrhea (Started on 4.16.23, ended on 4.23.23)    History of Present Illness    Patient initially presented 9/15/2022 with elevated LFTs, reported having alcohol maybe once a month.  Ultrasound of the liver in August showed fatty liver and common bile duct 6.4 mm with gallbladder, no gallstones seen  MRI of the abdomen with and without contrast MRCP 10/14/2022: Significant fatty liver, enlarged to 21 cm, CBD 4 mm, trace bilateral pleural effusions-patient advised to follow-up with primary care for this     Liver work-up negative other than ferritin 842, iron saturation was normal, iron was normal, as a precaution hemochromatosis gene test--  1 gene detected, heterozygous finding usually not associated with increased risk of clinical symptoms    Patient was last seen 12/5/2022, reported trouble losing weight due to moving and also injured her toe.  She did lose 2 pounds from the previous visit.    Ultrasound of the liver 4/20/2023: Liver prominent in size measuring 18.5 cm, hepatic steatosis is noted and mild hepatomegaly, common bile duct 6 mm, gallbladder appears normal    Last LFTs 4/17/2023: ALT 64, AST 63, Alk phos normal at 50, bilirubin less than 0.2,   1/3/2023: Iron saturation low at 14, INR normal at 0.94, Ferritin 718    Patient has gained 3 pounds since being here  Pt states she drank red wine 4/15, had abd pain the next day and diarrhea went on for 7-10 days , had temp 100 also w episode. Pt states she is now back to  "baseline.   No blood in stool     Past Medical History:   Diagnosis Date   • Allergic rhinitis    • Asthma     4th grade   • Broken bones     Elementary   • Migraine headache    • Sinus trouble        Past Surgical History:   Procedure Laterality Date   • WISDOM TOOTH EXTRACTION         Allergies   Allergen Reactions   • Brompheniramine-Pseudoeph Rash   • Cephalexin Rash       Family History   Problem Relation Age of Onset   • Prostate cancer Father    • Other Mother         Cerebrovascular Accident   • Breast cancer Sister    • Colon cancer Neg Hx    • Ovarian cancer Neg Hx    • Uterine cancer Neg Hx         Social History     Tobacco Use   • Smoking status: Never   • Smokeless tobacco: Never   Vaping Use   • Vaping Use: Never used   Substance Use Topics   • Alcohol use: Not Currently     Comment: rarely   • Drug use: Never       Objective     Vital Signs:   /78 (BP Location: Left arm, Patient Position: Sitting, Cuff Size: Adult)   Pulse 103   Ht 160 cm (63\")   Wt 79.5 kg (175 lb 3.2 oz)   BMI 31.04 kg/m²       Physical Exam  Constitutional:       General: The patient is not in acute distress.     Appearance: Normal appearance.   HENT:      Head: Normocephalic and atraumatic.      Nose: Nose normal.   Pulmonary:      Effort: Pulmonary effort is normal. No respiratory distress.   Abdominal:      General: Abdomen is flat.      Palpations: Abdomen is soft. There is no mass.      Tenderness: There is no abdominal tenderness. There is no guarding.   Musculoskeletal:      Cervical back: Neck supple.      Right lower leg: No edema.      Left lower leg: No edema.   Skin:     General: Skin is warm and dry.   Neurological:      General: No focal deficit present.      Mental Status: The patient is alert and oriented to person, place, and time.      Gait: Gait normal.   Psychiatric:         Mood and Affect: Mood normal.         Speech: Speech normal.         Behavior: Behavior normal.         Thought Content: Thought " content normal.     Result Review :   The following data was reviewed by: KYUNG Aguilar on 05/08/2023:    CBC w/diff        7/25/2022    11:37 4/17/2023    12:08   CBC w/Diff   WBC 9.46   7.37     RBC 4.28   4.81     Hemoglobin 13.3   14.6     Hematocrit 38.6   44.0     MCV 90.2   91.5     MCH 31.1   30.4     MCHC 34.5   33.2     RDW 11.7   12.0     Platelets 291   267     Neutrophil Rel % 49.3   51.4     Immature Granulocyte Rel % 0.1   0.3     Lymphocyte Rel % 39.0   33.9     Monocyte Rel % 5.6   10.0     Eosinophil Rel % 4.8   3.5     Basophil Rel % 1.2   0.9       CMP        9/15/2022    09:31 1/3/2023    15:11 4/17/2023    12:08   CMP   Glucose   86     BUN   10     Creatinine   0.67     EGFR   116.3     Sodium   140     Potassium   4.6     Chloride   104     Calcium   10.1     Total Protein 8.0       Total Protein 8.0   7.8   8.2     Albumin 3.7      4.60   4.3   4.5     Globulin 4.3       Globulin   3.7     Total Bilirubin 0.3   <0.2   <0.2     Alkaline Phosphatase 48   52   50     AST (SGOT) 73   66   63     ALT (SGPT) 94   83   64     Albumin/Globulin Ratio   1.2     BUN/Creatinine Ratio   14.9     Anion Gap   13.8         Iron Profile   Iron   Date Value Ref Range Status   01/03/2023 73 37 - 145 mcg/dL Final     TIBC   Date Value Ref Range Status   01/03/2023 532 298 - 536 mcg/dL Final     Iron Saturation   Date Value Ref Range Status   01/03/2023 14 (L) 20 - 50 % Final     Transferrin   Date Value Ref Range Status   01/03/2023 357 200 - 360 mg/dL Final     Ferritin   Ferritin   Date Value Ref Range Status   01/03/2023 718.00 (H) 13.00 - 150.00 ng/mL Final               Assessment and Plan    Diagnoses and all orders for this visit:    1. Elevated LFTs (Primary)  -     CBC (No Diff); Future  -     Hepatic Function Panel; Future  -     Protime-INR; Future  -     US Liver; Future    2. Fatty liver  -     CBC (No Diff); Future  -     Hepatic Function Panel; Future  -     Protime-INR; Future  -      US Liver; Future    3. Class 1 obesity with serious comorbidity and body mass index (BMI) of 31.0 to 31.9 in adult, unspecified obesity type              Follow Up   Return in about 6 months (around 11/8/2023).   Liver US and labs in October  Cont w low carb or low lucas diet diet for wt loss , attempt 2-4 c. Coffee/d (states sometimes it bothers her)  Call if any further diarrhea occurs    Patient was given instructions and counseling regarding her condition or for health maintenance advice. Please see specific information pulled into the AVS if appropriate.

## 2023-05-19 ENCOUNTER — OFFICE VISIT (OUTPATIENT)
Dept: INTERNAL MEDICINE | Facility: CLINIC | Age: 38
End: 2023-05-19
Payer: COMMERCIAL

## 2023-05-19 VITALS
BODY MASS INDEX: 30.79 KG/M2 | DIASTOLIC BLOOD PRESSURE: 64 MMHG | SYSTOLIC BLOOD PRESSURE: 103 MMHG | TEMPERATURE: 96.5 F | WEIGHT: 173.8 LBS

## 2023-05-19 DIAGNOSIS — J30.9 ALLERGIC RHINITIS, UNSPECIFIED SEASONALITY, UNSPECIFIED TRIGGER: Primary | Chronic | ICD-10-CM

## 2023-05-19 DIAGNOSIS — H92.01 EAR PAIN, RIGHT: ICD-10-CM

## 2023-05-19 PROCEDURE — 99213 OFFICE O/P EST LOW 20 MIN: CPT | Performed by: NURSE PRACTITIONER

## 2023-05-19 RX ORDER — CETIRIZINE HYDROCHLORIDE 10 MG/1
10 TABLET ORAL DAILY
Qty: 90 TABLET | Refills: 1 | Status: SHIPPED | OUTPATIENT
Start: 2023-05-19

## 2023-05-19 RX ORDER — HYDROXYZINE HYDROCHLORIDE 25 MG/1
25 TABLET, FILM COATED ORAL
Qty: 30 TABLET | Refills: 0 | Status: SHIPPED | OUTPATIENT
Start: 2023-05-19

## 2023-05-19 NOTE — PROGRESS NOTES
Chief Complaint  Earache (Right ear /Hurting worse when bending over/) and Med Refill (Refill on hydroxyzine )    Subjective         Minh Drake presents to [facility]  Patient reports history of ear pain, typically occurs when she does not take her allergy medications as prescribed.  Patient states right ear pain started yesterday, describes as sharp.  Admits that today the pain is more dull, did have slight dizziness with position changes.  She has not been taking her Flonase or Zyrtec.  Denies changes in vision/speech/hearing/gait, worst headache of her life. Patient is wondering if she can have an antibiotic to help this.  Requesting a refill of hydroxyzine, previously prescribed by dermatology for skin changes on her hands.       Objective     Vitals:    05/19/23 1349   BP: 103/64   Temp: 96.5 °F (35.8 °C)   TempSrc: Temporal   Weight: 78.8 kg (173 lb 12.8 oz)      Body mass index is 30.79 kg/m².    Wt Readings from Last 3 Encounters:   05/19/23 78.8 kg (173 lb 12.8 oz)   05/08/23 79.5 kg (175 lb 3.2 oz)   04/17/23 77.6 kg (171 lb)     BP Readings from Last 3 Encounters:   05/19/23 103/64   05/08/23 122/78   04/17/23 108/78                Physical Exam  Constitutional:       Appearance: Normal appearance.   HENT:      Head: Normocephalic and atraumatic.      Ears:      Comments: Bilateral TM nonpurulent effusion     Nose: Nose normal.      Mouth/Throat:      Mouth: Mucous membranes are moist.      Pharynx: Oropharynx is clear.   Eyes:      Extraocular Movements: Extraocular movements intact.      Conjunctiva/sclera: Conjunctivae normal.      Pupils: Pupils are equal, round, and reactive to light.   Cardiovascular:      Rate and Rhythm: Normal rate and regular rhythm.      Heart sounds: Normal heart sounds.   Pulmonary:      Effort: Pulmonary effort is normal.      Breath sounds: Normal breath sounds.   Skin:     General: Skin is warm and dry.   Neurological:       General: No focal deficit present.      Mental Status: She is alert and oriented to person, place, and time.   Psychiatric:         Mood and Affect: Mood normal.         Behavior: Behavior normal.         Thought Content: Thought content normal.          Result Review :   The following data was reviewed by: KYUNG Roberts on 05/19/2023:      Procedures    Assessment and Plan   Diagnoses and all orders for this visit:    1. Allergic rhinitis, unspecified seasonality, unspecified trigger (Primary)  Assessment & Plan:  Patient to restart Flonase and Zyrtec. Discussed with patient that she does not have an ear infection and that antibiotics would not be indicated at this time. She will monitor symptoms and call or return to clinic with concerns.       2. Ear pain, right    Other orders  -     cetirizine (zyrTEC) 10 MG tablet; Take 1 tablet by mouth Daily.  Dispense: 90 tablet; Refill: 1  -     hydrOXYzine (ATARAX) 25 MG tablet; Take 1 tablet by mouth every night at bedtime.  Dispense: 30 tablet; Refill: 0        Follow Up   Return if symptoms worsen or fail to improve.  Patient was given instructions and counseling regarding her condition or for health maintenance advice. Please see specific information pulled into the AVS if appropriate.

## 2023-05-19 NOTE — ASSESSMENT & PLAN NOTE
Patient to restart Flonase and Zyrtec. Discussed with patient that she does not have an ear infection and that antibiotics would not be indicated at this time. She will monitor symptoms and call or return to clinic with concerns.

## 2023-05-24 DIAGNOSIS — G47.00 INSOMNIA, UNSPECIFIED TYPE: Chronic | ICD-10-CM

## 2023-05-24 RX ORDER — TRAZODONE HYDROCHLORIDE 50 MG/1
50 TABLET ORAL NIGHTLY
Qty: 90 TABLET | Refills: 1 | Status: SHIPPED | OUTPATIENT
Start: 2023-05-24

## 2023-07-24 ENCOUNTER — HOSPITAL ENCOUNTER (OUTPATIENT)
Dept: ULTRASOUND IMAGING | Facility: HOSPITAL | Age: 38
Discharge: HOME | End: 2023-07-24
Admitting: NURSE PRACTITIONER
Payer: COMMERCIAL

## 2023-07-24 DIAGNOSIS — E04.9 ENLARGED THYROID: ICD-10-CM

## 2023-07-24 PROCEDURE — 76536 US EXAM OF HEAD AND NECK: CPT

## 2023-07-28 ENCOUNTER — LAB (OUTPATIENT)
Dept: LAB | Facility: HOSPITAL | Age: 38
End: 2023-07-28
Payer: COMMERCIAL

## 2023-07-28 DIAGNOSIS — Z00.00 ANNUAL PHYSICAL EXAM: ICD-10-CM

## 2023-07-28 LAB
ALBUMIN SERPL-MCNC: 4.3 G/DL (ref 3.5–5.2)
ALBUMIN/GLOB SERPL: 1.5 G/DL
ALP SERPL-CCNC: 47 U/L (ref 39–117)
ALT SERPL W P-5'-P-CCNC: 43 U/L (ref 1–33)
ANION GAP SERPL CALCULATED.3IONS-SCNC: 12.8 MMOL/L (ref 5–15)
AST SERPL-CCNC: 59 U/L (ref 1–32)
BASOPHILS # BLD AUTO: 0.11 10*3/MM3 (ref 0–0.2)
BASOPHILS NFR BLD AUTO: 1 % (ref 0–1.5)
BILIRUB SERPL-MCNC: 0.3 MG/DL (ref 0–1.2)
BUN SERPL-MCNC: 8 MG/DL (ref 6–20)
BUN/CREAT SERPL: 11.3 (ref 7–25)
CALCIUM SPEC-SCNC: 9.5 MG/DL (ref 8.6–10.5)
CHLORIDE SERPL-SCNC: 106 MMOL/L (ref 98–107)
CHOLEST SERPL-MCNC: 200 MG/DL (ref 0–200)
CO2 SERPL-SCNC: 21.2 MMOL/L (ref 22–29)
CREAT SERPL-MCNC: 0.71 MG/DL (ref 0.57–1)
DEPRECATED RDW RBC AUTO: 38 FL (ref 37–54)
EGFRCR SERPLBLD CKD-EPI 2021: 113.2 ML/MIN/1.73
EOSINOPHIL # BLD AUTO: 0.44 10*3/MM3 (ref 0–0.4)
EOSINOPHIL NFR BLD AUTO: 4 % (ref 0.3–6.2)
ERYTHROCYTE [DISTWIDTH] IN BLOOD BY AUTOMATED COUNT: 11.9 % (ref 12.3–15.4)
GLOBULIN UR ELPH-MCNC: 2.9 GM/DL
GLUCOSE SERPL-MCNC: 85 MG/DL (ref 65–99)
HCT VFR BLD AUTO: 40.4 % (ref 34–46.6)
HDLC SERPL-MCNC: 48 MG/DL (ref 40–60)
HGB BLD-MCNC: 13.5 G/DL (ref 12–15.9)
IMM GRANULOCYTES # BLD AUTO: 0.04 10*3/MM3 (ref 0–0.05)
IMM GRANULOCYTES NFR BLD AUTO: 0.4 % (ref 0–0.5)
LDLC SERPL CALC-MCNC: 113 MG/DL (ref 0–100)
LDLC/HDLC SERPL: 2.23 {RATIO}
LYMPHOCYTES # BLD AUTO: 4.04 10*3/MM3 (ref 0.7–3.1)
LYMPHOCYTES NFR BLD AUTO: 37.2 % (ref 19.6–45.3)
MCH RBC QN AUTO: 29.7 PG (ref 26.6–33)
MCHC RBC AUTO-ENTMCNC: 33.4 G/DL (ref 31.5–35.7)
MCV RBC AUTO: 89 FL (ref 79–97)
MONOCYTES # BLD AUTO: 0.62 10*3/MM3 (ref 0.1–0.9)
MONOCYTES NFR BLD AUTO: 5.7 % (ref 5–12)
NEUTROPHILS NFR BLD AUTO: 5.62 10*3/MM3 (ref 1.7–7)
NEUTROPHILS NFR BLD AUTO: 51.7 % (ref 42.7–76)
NRBC BLD AUTO-RTO: 0 /100 WBC (ref 0–0.2)
PLATELET # BLD AUTO: 297 10*3/MM3 (ref 140–450)
PMV BLD AUTO: 9.8 FL (ref 6–12)
POTASSIUM SERPL-SCNC: 4.4 MMOL/L (ref 3.5–5.2)
PROT SERPL-MCNC: 7.2 G/DL (ref 6–8.5)
RBC # BLD AUTO: 4.54 10*6/MM3 (ref 3.77–5.28)
SODIUM SERPL-SCNC: 140 MMOL/L (ref 136–145)
TRIGL SERPL-MCNC: 224 MG/DL (ref 0–150)
TSH SERPL DL<=0.05 MIU/L-ACNC: 2.64 UIU/ML (ref 0.27–4.2)
VLDLC SERPL-MCNC: 39 MG/DL (ref 5–40)
WBC NRBC COR # BLD: 10.87 10*3/MM3 (ref 3.4–10.8)

## 2023-07-28 PROCEDURE — 80061 LIPID PANEL: CPT

## 2023-07-28 PROCEDURE — 80050 GENERAL HEALTH PANEL: CPT

## 2023-07-31 RX ORDER — HYDROXYZINE HYDROCHLORIDE 25 MG/1
25 TABLET, FILM COATED ORAL
Qty: 30 TABLET | Refills: 0 | Status: SHIPPED | OUTPATIENT
Start: 2023-07-31

## 2023-08-16 ENCOUNTER — OFFICE VISIT (OUTPATIENT)
Dept: INTERNAL MEDICINE | Facility: CLINIC | Age: 38
End: 2023-08-16
Payer: COMMERCIAL

## 2023-08-16 VITALS
OXYGEN SATURATION: 98 % | HEIGHT: 64 IN | DIASTOLIC BLOOD PRESSURE: 70 MMHG | TEMPERATURE: 97.3 F | RESPIRATION RATE: 18 BRPM | HEART RATE: 87 BPM | SYSTOLIC BLOOD PRESSURE: 118 MMHG | BODY MASS INDEX: 30.05 KG/M2 | WEIGHT: 176 LBS

## 2023-08-16 DIAGNOSIS — J02.0 STREP PHARYNGITIS: ICD-10-CM

## 2023-08-16 DIAGNOSIS — E04.1 THYROID NODULE: ICD-10-CM

## 2023-08-16 DIAGNOSIS — G47.00 INSOMNIA, UNSPECIFIED TYPE: Chronic | ICD-10-CM

## 2023-08-16 DIAGNOSIS — Z20.818 EXPOSURE TO STREP THROAT: Primary | ICD-10-CM

## 2023-08-16 LAB
EXPIRATION DATE: NORMAL
INTERNAL CONTROL: NORMAL
Lab: 7376
S PYO AG THROAT QL: NEGATIVE

## 2023-08-16 PROCEDURE — 87081 CULTURE SCREEN ONLY: CPT | Performed by: NURSE PRACTITIONER

## 2023-08-16 RX ORDER — CETIRIZINE HYDROCHLORIDE 10 MG/1
10 TABLET ORAL DAILY
Qty: 90 TABLET | Refills: 1 | Status: SHIPPED | OUTPATIENT
Start: 2023-08-16

## 2023-08-16 RX ORDER — HYDROXYZINE HYDROCHLORIDE 25 MG/1
25 TABLET, FILM COATED ORAL
Qty: 90 TABLET | Refills: 1 | Status: SHIPPED | OUTPATIENT
Start: 2023-08-16

## 2023-08-16 RX ORDER — ERGOCALCIFEROL 1.25 MG/1
50000 CAPSULE ORAL WEEKLY
Qty: 13 CAPSULE | Refills: 1 | Status: SHIPPED | OUTPATIENT
Start: 2023-08-16

## 2023-08-16 RX ORDER — AMOXICILLIN 500 MG/1
500 CAPSULE ORAL 3 TIMES DAILY
Qty: 30 CAPSULE | Refills: 0 | Status: SHIPPED | OUTPATIENT
Start: 2023-08-16 | End: 2023-08-26

## 2023-08-16 NOTE — PROGRESS NOTES
"Chief Complaint  Thyroid Problem (US thyroid follow up )    Subjective          Minh Drake presents to [facility]  History of Present Illness  Thyroid US reviewed  No further follow up eval needed at this time  Pt reports hx of hyperthyroid/basedow's    Son diagnosed with strep yesterday  Would like to  be tested    Insomnia-sleeping somewhat better with hydroxyzine. Taking prn  Objective   Vital Signs:   /70 (BP Location: Left arm, Patient Position: Sitting, Cuff Size: Adult)   Pulse 87   Temp 97.3 øF (36.3 øC)   Resp 18   Ht 161.3 cm (63.5\")   Wt 79.8 kg (176 lb)   SpO2 98%   BMI 30.69 kg/mý     Physical Exam  Vitals and nursing note reviewed.   Constitutional:       General: She is not in acute distress.     Appearance: Normal appearance.   HENT:      Head: Normocephalic and atraumatic.      Right Ear: External ear normal.      Left Ear: External ear normal.      Nose: Nose normal.      Mouth/Throat:      Mouth: Mucous membranes are moist.      Pharynx: Posterior oropharyngeal erythema present.   Eyes:      Conjunctiva/sclera: Conjunctivae normal.   Cardiovascular:      Rate and Rhythm: Normal rate and regular rhythm.      Pulses: Normal pulses.      Heart sounds: Normal heart sounds. No murmur heard.    No friction rub. No gallop.   Pulmonary:      Effort: Pulmonary effort is normal. No respiratory distress.      Breath sounds: No wheezing, rhonchi or rales.   Musculoskeletal:      Cervical back: Neck supple.      Right lower leg: No edema.      Left lower leg: No edema.   Lymphadenopathy:      Cervical: Cervical adenopathy present.   Skin:     General: Skin is warm and dry.   Neurological:      General: No focal deficit present.      Mental Status: She is alert and oriented to person, place, and time.   Psychiatric:         Mood and Affect: Mood normal.         Behavior: Behavior normal.      Result Review :          Procedures      Assessment " and Plan    Diagnoses and all orders for this visit:    1. Exposure to strep throat (Primary)  -     POCT rapid strep A    2. Strep pharyngitis  Comments:  treating for strep based on exam findings. neg rapid, sending back up    3. Thyroid nodule  Comments:  TIRADs 4. no further imaging at this time    4. Insomnia, unspecified type  Comments:  reviewed sleep hygeine    Other orders  -     cetirizine (zyrTEC) 10 MG tablet; Take 1 tablet by mouth Daily.  Dispense: 90 tablet; Refill: 1  -     hydrOXYzine (ATARAX) 25 MG tablet; Take 1 tablet by mouth every night at bedtime.  Dispense: 90 tablet; Refill: 1  -     vitamin D (ERGOCALCIFEROL) 1.25 MG (43020 UT) capsule capsule; Take 1 capsule by mouth 1 (One) Time Per Week.  Dispense: 13 capsule; Refill: 1  -     amoxicillin (AMOXIL) 500 MG capsule; Take 1 capsule by mouth 3 (Three) Times a Day for 10 days.  Dispense: 30 capsule; Refill: 0              Follow Up   Return in about 3 months (around 11/16/2023) for Annual physical.  Patient was given instructions and counseling regarding her condition or for health maintenance advice. Please see specific information pulled into the AVS if appropriate.

## 2023-08-18 LAB — BACTERIA SPEC AEROBE CULT: NORMAL

## 2023-09-12 ENCOUNTER — OFFICE VISIT (OUTPATIENT)
Dept: INTERNAL MEDICINE | Facility: CLINIC | Age: 38
End: 2023-09-12
Payer: COMMERCIAL

## 2023-09-12 VITALS
TEMPERATURE: 97.4 F | HEIGHT: 64 IN | RESPIRATION RATE: 14 BRPM | WEIGHT: 176.2 LBS | SYSTOLIC BLOOD PRESSURE: 122 MMHG | OXYGEN SATURATION: 97 % | HEART RATE: 92 BPM | DIASTOLIC BLOOD PRESSURE: 84 MMHG | BODY MASS INDEX: 30.08 KG/M2

## 2023-09-12 DIAGNOSIS — J06.9 VIRAL URI WITH COUGH: Primary | ICD-10-CM

## 2023-09-12 DIAGNOSIS — J02.9 SORE THROAT: ICD-10-CM

## 2023-09-12 LAB
EXPIRATION DATE: NORMAL
EXPIRATION DATE: NORMAL
FLUAV AG UPPER RESP QL IA.RAPID: NOT DETECTED
FLUBV AG UPPER RESP QL IA.RAPID: NOT DETECTED
INTERNAL CONTROL: NORMAL
INTERNAL CONTROL: NORMAL
Lab: 6887
Lab: 8208
S PYO AG THROAT QL: NEGATIVE
SARS-COV-2 AG UPPER RESP QL IA.RAPID: NOT DETECTED

## 2023-09-12 PROCEDURE — 87880 STREP A ASSAY W/OPTIC: CPT | Performed by: NURSE PRACTITIONER

## 2023-09-12 PROCEDURE — 99213 OFFICE O/P EST LOW 20 MIN: CPT | Performed by: NURSE PRACTITIONER

## 2023-09-12 PROCEDURE — 87428 SARSCOV & INF VIR A&B AG IA: CPT | Performed by: NURSE PRACTITIONER

## 2023-09-12 RX ORDER — TIZANIDINE 2 MG/1
2 TABLET ORAL EVERY 6 HOURS PRN
COMMUNITY
Start: 2023-08-25

## 2023-09-12 NOTE — PROGRESS NOTES
"Chief Complaint  Fatigue and Other (Cold Symptoms )    Subjective        Minh Drake presents to [facility] for concerns for mild cough, congestion, fatigue.  Symptoms have been ongoing for almost 2 weeks, however over the weekend after an exposure at work to COVID-19 on Friday, patient's symptoms got significantly worse.  Patient is requesting testing before going back to work.  She has used some over-the-counter medication, which helps minimally.  No other concerns today.    Objective   Vital Signs:   /84 (BP Location: Right arm, Patient Position: Sitting, Cuff Size: Adult)   Pulse 92   Temp 97.4 °F (36.3 °C) (Temporal)   Resp 14   Ht 161.3 cm (63.5\")   Wt 79.9 kg (176 lb 3.2 oz)   SpO2 97%   BMI 30.72 kg/m²     Physical Exam  Vitals and nursing note reviewed.   Constitutional:       Appearance: Normal appearance.   HENT:      Head: Normocephalic and atraumatic.      Right Ear: Tympanic membrane, ear canal and external ear normal.      Left Ear: Tympanic membrane, ear canal and external ear normal.      Nose: Congestion present.      Mouth/Throat:      Mouth: Mucous membranes are moist.      Pharynx: Oropharynx is clear.   Eyes:      Conjunctiva/sclera: Conjunctivae normal.      Pupils: Pupils are equal, round, and reactive to light.   Cardiovascular:      Rate and Rhythm: Normal rate and regular rhythm.      Pulses: Normal pulses.      Heart sounds: Normal heart sounds.   Pulmonary:      Effort: Pulmonary effort is normal.      Breath sounds: Normal breath sounds.   Musculoskeletal:      Cervical back: Normal range of motion and neck supple.   Skin:     General: Skin is warm.      Capillary Refill: Capillary refill takes less than 2 seconds.   Neurological:      Mental Status: She is alert.   Psychiatric:         Mood and Affect: Mood normal.      Result Review :  {The following data was reviewed by KYUNG Washburn on 09/12/23                Diagnoses and all orders " for this visit:    1. Viral URI with cough (Primary)    2. Sore throat  -     POCT rapid strep A  -     POCT SARS-CoV-2 Antigen SERGE    COVID, flu, strep all negative in office.  Most likely viral etiology, we discussed the potential of it being COVID or flu, but more unlikely with negative testing.  Excuse from work for yesterday, today, tomorrow.  Return Thursday if symptoms are improving.  Continue with over-the-counter medications as she has been, can use Tylenol and/or Motrin for discomfort.  Rest, hydrate.  Follow-up if worsening or persistent.      Follow Up   No follow-ups on file.  Patient was given instructions and counseling regarding her condition or for health maintenance advice. Please see specific information pulled into the AVS if appropriate.     KYUNG Washburn  9/12/2023  This note was electronically signed.

## 2023-10-16 ENCOUNTER — HOSPITAL ENCOUNTER (OUTPATIENT)
Dept: ULTRASOUND IMAGING | Facility: HOSPITAL | Age: 38
Discharge: HOME | End: 2023-10-16
Admitting: NURSE PRACTITIONER
Payer: COMMERCIAL

## 2023-10-16 DIAGNOSIS — K76.0 FATTY LIVER: ICD-10-CM

## 2023-10-16 DIAGNOSIS — R79.89 ELEVATED LFTS: ICD-10-CM

## 2023-10-16 PROCEDURE — 76705 ECHO EXAM OF ABDOMEN: CPT

## 2023-11-01 ENCOUNTER — TELEPHONE (OUTPATIENT)
Dept: GASTROENTEROLOGY | Facility: CLINIC | Age: 38
End: 2023-11-01
Payer: COMMERCIAL

## 2023-11-03 ENCOUNTER — LAB (OUTPATIENT)
Dept: LAB | Facility: HOSPITAL | Age: 38
End: 2023-11-03
Payer: COMMERCIAL

## 2023-11-03 DIAGNOSIS — R79.89 ELEVATED LFTS: ICD-10-CM

## 2023-11-03 DIAGNOSIS — K76.0 FATTY LIVER: ICD-10-CM

## 2023-11-03 LAB
ALBUMIN SERPL-MCNC: 4.5 G/DL (ref 3.5–5.2)
ALP SERPL-CCNC: 55 U/L (ref 39–117)
ALT SERPL W P-5'-P-CCNC: 33 U/L (ref 1–33)
AST SERPL-CCNC: 37 U/L (ref 1–32)
BILIRUB CONJ SERPL-MCNC: <0.2 MG/DL (ref 0–0.3)
BILIRUB INDIRECT SERPL-MCNC: ABNORMAL MG/DL
BILIRUB SERPL-MCNC: 0.2 MG/DL (ref 0–1.2)
DEPRECATED RDW RBC AUTO: 38.1 FL (ref 37–54)
ERYTHROCYTE [DISTWIDTH] IN BLOOD BY AUTOMATED COUNT: 11.7 % (ref 12.3–15.4)
HCT VFR BLD AUTO: 41.2 % (ref 34–46.6)
HGB BLD-MCNC: 14.1 G/DL (ref 12–15.9)
INR PPP: 1.04 (ref 0.86–1.15)
MCH RBC QN AUTO: 30.6 PG (ref 26.6–33)
MCHC RBC AUTO-ENTMCNC: 34.2 G/DL (ref 31.5–35.7)
MCV RBC AUTO: 89.4 FL (ref 79–97)
PLATELET # BLD AUTO: 327 10*3/MM3 (ref 140–450)
PMV BLD AUTO: 9.9 FL (ref 6–12)
PROT SERPL-MCNC: 7.9 G/DL (ref 6–8.5)
PROTHROMBIN TIME: 13.7 SECONDS (ref 11.8–14.9)
RBC # BLD AUTO: 4.61 10*6/MM3 (ref 3.77–5.28)
WBC NRBC COR # BLD: 9.7 10*3/MM3 (ref 3.4–10.8)

## 2023-11-03 PROCEDURE — 85027 COMPLETE CBC AUTOMATED: CPT

## 2023-11-03 PROCEDURE — 36415 COLL VENOUS BLD VENIPUNCTURE: CPT

## 2023-11-03 PROCEDURE — 85610 PROTHROMBIN TIME: CPT

## 2023-11-03 PROCEDURE — 80076 HEPATIC FUNCTION PANEL: CPT

## 2023-11-20 NOTE — PROGRESS NOTES
"GYN Problem/Follow Up Visit    Chief Complaint   Patient presents with    Follow-up     Right ovary pain          HPI  Minh Drake is a 38 y.o. female, , who presents for right lower pelvic sharp pain, began 4 days ago, pain was intermittent over course of 2 days. Last 1-2 minutes when it hit.   Use of nuvaring continuous, no menstruation  Desires pelvic imaging    The pain has resolved.    Normal bowel and bladder habits    PDF Report (2022 13:56)PDF Report (2022 13:56)     Additional OB/GYN History   No LMP recorded (lmp unknown). Patient has had an implant.  Current contraception: contraceptive methods: NuvaRing vaginal inserts    Past Medical History:   Diagnosis Date    Allergic rhinitis     Asthma     4th grade    Broken bones     Elementary    Migraine headache     Sinus trouble       Past Surgical History:   Procedure Laterality Date    WISDOM TOOTH EXTRACTION        Family History   Problem Relation Age of Onset    Prostate cancer Father     Other Mother         Cerebrovascular Accident    Breast cancer Sister 44    Colon cancer Neg Hx     Ovarian cancer Neg Hx     Uterine cancer Neg Hx      Allergies as of 2023 - Reviewed 2023   Allergen Reaction Noted    Brompheniramine-pseudoeph Rash 2022    Cephalexin Rash 08/10/2021      The additional following portions of the patient's history were reviewed and updated as appropriate: allergies, current medications, past family history, past medical history, past social history, past surgical history, and problem list.    Review of Systems    See HPI for pertinent ROS    Objective   /88   Pulse 93   Ht 161.3 cm (63.5\")   Wt 78.9 kg (174 lb)   LMP  (LMP Unknown) Comment: Ring  BMI 30.34 kg/m²     Physical Exam  Vitals and nursing note reviewed. Exam conducted with a chaperone present.   Constitutional:       Appearance: Normal appearance.   Cardiovascular:      Rate and Rhythm: Normal rate.   Pulmonary:      Effort: " Pulmonary effort is normal.   Abdominal:      General: There is no distension.      Palpations: Abdomen is soft.      Tenderness: There is no right CVA tenderness or left CVA tenderness.   Genitourinary:     General: Normal vulva.      Vagina: Normal. Foreign body: nuvaring in place in vaginal cavity.      Cervix: Normal.      Uterus: Normal.       Adnexa: Right adnexa normal and left adnexa normal.   Lymphadenopathy:      Lower Body: No right inguinal adenopathy. No left inguinal adenopathy.   Skin:     General: Skin is warm and dry.   Neurological:      Mental Status: She is alert and oriented to person, place, and time.        Assessment and Plan    Diagnoses and all orders for this visit:    1. Pelvic pain (Primary)  -     POC Pregnancy, Urine  -     Urinalysis With Culture If Indicated - Urine, Clean Catch  -     US Pelvis Transvaginal Non OB; Future      HCG, Urine, QL   Date Value Ref Range Status   11/21/2023 Negative Negative Final       Counseling:  Complete workup, continue nuvaring. TORSION precautions.  To ER immediately.  Questions answered.  She agrees to FU prn worsening or persistent pain.      Follow Up:  Return if symptoms worsen or fail to improve, for will call wiht results of ultrasound.        Ivis Jain, APRN  11/21/2023

## 2023-11-21 ENCOUNTER — OFFICE VISIT (OUTPATIENT)
Dept: OBSTETRICS AND GYNECOLOGY | Facility: CLINIC | Age: 38
End: 2023-11-21
Payer: COMMERCIAL

## 2023-11-21 ENCOUNTER — OFFICE VISIT (OUTPATIENT)
Dept: INTERNAL MEDICINE | Facility: CLINIC | Age: 38
End: 2023-11-21
Payer: COMMERCIAL

## 2023-11-21 VITALS
TEMPERATURE: 97.1 F | OXYGEN SATURATION: 97 % | HEART RATE: 89 BPM | SYSTOLIC BLOOD PRESSURE: 118 MMHG | RESPIRATION RATE: 18 BRPM | HEIGHT: 64 IN | BODY MASS INDEX: 29.71 KG/M2 | DIASTOLIC BLOOD PRESSURE: 72 MMHG | WEIGHT: 174 LBS

## 2023-11-21 VITALS
HEIGHT: 64 IN | SYSTOLIC BLOOD PRESSURE: 127 MMHG | WEIGHT: 174 LBS | HEART RATE: 93 BPM | BODY MASS INDEX: 29.71 KG/M2 | DIASTOLIC BLOOD PRESSURE: 88 MMHG

## 2023-11-21 DIAGNOSIS — E55.9 VITAMIN D DEFICIENCY: ICD-10-CM

## 2023-11-21 DIAGNOSIS — Z00.00 ANNUAL PHYSICAL EXAM: Primary | ICD-10-CM

## 2023-11-21 DIAGNOSIS — F41.1 GAD (GENERALIZED ANXIETY DISORDER): ICD-10-CM

## 2023-11-21 DIAGNOSIS — G47.00 INSOMNIA, UNSPECIFIED TYPE: Chronic | ICD-10-CM

## 2023-11-21 DIAGNOSIS — R10.2 PELVIC PAIN: Primary | ICD-10-CM

## 2023-11-21 DIAGNOSIS — F33.0 MAJOR DEPRESSIVE DISORDER, RECURRENT, MILD: ICD-10-CM

## 2023-11-21 DIAGNOSIS — R10.31 RLQ ABDOMINAL PAIN: ICD-10-CM

## 2023-11-21 LAB
25(OH)D3 SERPL-MCNC: 44.3 NG/ML (ref 30–100)
ALBUMIN SERPL-MCNC: 4.7 G/DL (ref 3.5–5.2)
ALBUMIN/GLOB SERPL: 1.6 G/DL
ALP SERPL-CCNC: 57 U/L (ref 39–117)
ALT SERPL W P-5'-P-CCNC: 47 U/L (ref 1–33)
AMYLASE SERPL-CCNC: 189 U/L (ref 28–100)
ANION GAP SERPL CALCULATED.3IONS-SCNC: 12 MMOL/L (ref 5–15)
AST SERPL-CCNC: 66 U/L (ref 1–32)
B-HCG UR QL: NEGATIVE
BACTERIA UR QL AUTO: ABNORMAL /HPF
BASOPHILS # BLD AUTO: 0.1 10*3/MM3 (ref 0–0.2)
BASOPHILS NFR BLD AUTO: 1.1 % (ref 0–1.5)
BILIRUB SERPL-MCNC: 0.2 MG/DL (ref 0–1.2)
BILIRUB UR QL STRIP: NEGATIVE
BUN SERPL-MCNC: 9 MG/DL (ref 6–20)
BUN/CREAT SERPL: 12.3 (ref 7–25)
CALCIUM SPEC-SCNC: 10 MG/DL (ref 8.6–10.5)
CHLORIDE SERPL-SCNC: 104 MMOL/L (ref 98–107)
CHOLEST SERPL-MCNC: 213 MG/DL (ref 0–200)
CLARITY UR: CLEAR
CO2 SERPL-SCNC: 24 MMOL/L (ref 22–29)
COLOR UR: YELLOW
CREAT SERPL-MCNC: 0.73 MG/DL (ref 0.57–1)
DEPRECATED RDW RBC AUTO: 39.5 FL (ref 37–54)
EGFRCR SERPLBLD CKD-EPI 2021: 108.8 ML/MIN/1.73
EOSINOPHIL # BLD AUTO: 0.46 10*3/MM3 (ref 0–0.4)
EOSINOPHIL NFR BLD AUTO: 5.1 % (ref 0.3–6.2)
ERYTHROCYTE [DISTWIDTH] IN BLOOD BY AUTOMATED COUNT: 11.9 % (ref 12.3–15.4)
EXPIRATION DATE: NORMAL
GLOBULIN UR ELPH-MCNC: 2.9 GM/DL
GLUCOSE SERPL-MCNC: 110 MG/DL (ref 65–99)
GLUCOSE UR STRIP-MCNC: NEGATIVE MG/DL
HCT VFR BLD AUTO: 41.5 % (ref 34–46.6)
HDLC SERPL-MCNC: 50 MG/DL (ref 40–60)
HGB BLD-MCNC: 13.8 G/DL (ref 12–15.9)
HGB UR QL STRIP.AUTO: NEGATIVE
HOLD SPECIMEN: NORMAL
HYALINE CASTS UR QL AUTO: ABNORMAL /LPF
IMM GRANULOCYTES # BLD AUTO: 0.02 10*3/MM3 (ref 0–0.05)
IMM GRANULOCYTES NFR BLD AUTO: 0.2 % (ref 0–0.5)
INTERNAL NEGATIVE CONTROL: NORMAL
INTERNAL POSITIVE CONTROL: NORMAL
KETONES UR QL STRIP: NEGATIVE
LDLC SERPL CALC-MCNC: 121 MG/DL (ref 0–100)
LDLC/HDLC SERPL: 2.3 {RATIO}
LEUKOCYTE ESTERASE UR QL STRIP.AUTO: ABNORMAL
LIPASE SERPL-CCNC: 37 U/L (ref 13–60)
LYMPHOCYTES # BLD AUTO: 2.86 10*3/MM3 (ref 0.7–3.1)
LYMPHOCYTES NFR BLD AUTO: 32 % (ref 19.6–45.3)
Lab: NORMAL
MCH RBC QN AUTO: 30.2 PG (ref 26.6–33)
MCHC RBC AUTO-ENTMCNC: 33.3 G/DL (ref 31.5–35.7)
MCV RBC AUTO: 90.8 FL (ref 79–97)
MONOCYTES # BLD AUTO: 0.44 10*3/MM3 (ref 0.1–0.9)
MONOCYTES NFR BLD AUTO: 4.9 % (ref 5–12)
NEUTROPHILS NFR BLD AUTO: 5.06 10*3/MM3 (ref 1.7–7)
NEUTROPHILS NFR BLD AUTO: 56.7 % (ref 42.7–76)
NITRITE UR QL STRIP: NEGATIVE
NRBC BLD AUTO-RTO: 0 /100 WBC (ref 0–0.2)
PH UR STRIP.AUTO: 7 [PH] (ref 5–8)
PLATELET # BLD AUTO: 299 10*3/MM3 (ref 140–450)
PMV BLD AUTO: 10.2 FL (ref 6–12)
POTASSIUM SERPL-SCNC: 4.5 MMOL/L (ref 3.5–5.2)
PROT SERPL-MCNC: 7.6 G/DL (ref 6–8.5)
PROT UR QL STRIP: NEGATIVE
RBC # BLD AUTO: 4.57 10*6/MM3 (ref 3.77–5.28)
RBC # UR STRIP: ABNORMAL /HPF
REF LAB TEST METHOD: ABNORMAL
SODIUM SERPL-SCNC: 140 MMOL/L (ref 136–145)
SP GR UR STRIP: 1.02 (ref 1–1.03)
SQUAMOUS #/AREA URNS HPF: ABNORMAL /HPF
TRIGL SERPL-MCNC: 239 MG/DL (ref 0–150)
TSH SERPL DL<=0.05 MIU/L-ACNC: 2.83 UIU/ML (ref 0.27–4.2)
UROBILINOGEN UR QL STRIP: ABNORMAL
VLDLC SERPL-MCNC: 42 MG/DL (ref 5–40)
WBC # UR STRIP: ABNORMAL /HPF
WBC NRBC COR # BLD AUTO: 8.94 10*3/MM3 (ref 3.4–10.8)

## 2023-11-21 PROCEDURE — 80050 GENERAL HEALTH PANEL: CPT | Performed by: NURSE PRACTITIONER

## 2023-11-21 PROCEDURE — 80061 LIPID PANEL: CPT | Performed by: NURSE PRACTITIONER

## 2023-11-21 PROCEDURE — 82306 VITAMIN D 25 HYDROXY: CPT | Performed by: NURSE PRACTITIONER

## 2023-11-21 PROCEDURE — 83690 ASSAY OF LIPASE: CPT | Performed by: NURSE PRACTITIONER

## 2023-11-21 PROCEDURE — 82150 ASSAY OF AMYLASE: CPT | Performed by: NURSE PRACTITIONER

## 2023-11-21 PROCEDURE — 81001 URINALYSIS AUTO W/SCOPE: CPT | Performed by: NURSE PRACTITIONER

## 2023-11-21 RX ORDER — TIZANIDINE 2 MG/1
2 TABLET ORAL EVERY 8 HOURS PRN
Qty: 30 TABLET | Refills: 0 | Status: SHIPPED | OUTPATIENT
Start: 2023-11-21

## 2023-11-21 RX ORDER — TRAZODONE HYDROCHLORIDE 50 MG/1
50 TABLET ORAL NIGHTLY
Qty: 90 TABLET | Refills: 1 | Status: SHIPPED | OUTPATIENT
Start: 2023-11-21

## 2023-11-21 RX ORDER — ERGOCALCIFEROL 1.25 MG/1
50000 CAPSULE ORAL WEEKLY
Qty: 13 CAPSULE | Refills: 1 | Status: SHIPPED | OUTPATIENT
Start: 2023-11-21

## 2023-11-21 NOTE — PROGRESS NOTES
"Chief Complaint  Annual Exam and Depression (This past weekend she had feeling of depression and wants to discuss this. No history of depression. Changes in environment at work and home. Patient has friend/coworker who is in hospice care currently and has really got her thinking about her life.)    Subjective          Minh Drake presents to [facility]  History of Present Illness  Annual physical    Depression-she reports having more friends with health issues. She reports having a friend in hospice currently. She reports no hx of depression but has had some episodes of feeling down at times. She has not previously taken medication.  She reports trying counseling back in elementary school and did not like it at that time. She reports stress with her parents being in for a visit for a few mths. She reports her  also had a job change. He has been having some expected travel over the last few mths. She reports this has put more stress on her marriage. She also reports additional stress with kids sports. She reports not sleeping well and not getting her \"me time\". She also reports concern for employment. She reports having a rough day on Sunday but she feels like she is now better. Denies SI/HI    Right abdominal pain-she reports pain in the last 2 days. She has had similar pain previously. She feels like this was related to anxiety/stress. She reports pain is currently better  She has made an appt with GYN  Patient has follow-up today with GYN for right ovarian cyst    Doing PT for neck pain.  She reports using tizanidine as needed which helps.  Objective   Vital Signs:   /72 (BP Location: Left arm, Patient Position: Sitting, Cuff Size: Adult)   Pulse 89   Temp 97.1 °F (36.2 °C)   Resp 18   Ht 161.3 cm (63.5\")   Wt 78.9 kg (174 lb)   SpO2 97%   BMI 30.34 kg/m²     Physical Exam  Vitals and nursing note reviewed.   Constitutional:       General: She " is not in acute distress.     Appearance: Normal appearance.   HENT:      Head: Normocephalic and atraumatic.      Right Ear: External ear normal.      Left Ear: External ear normal.      Nose: Nose normal.      Mouth/Throat:      Mouth: Mucous membranes are moist.   Eyes:      Conjunctiva/sclera: Conjunctivae normal.   Cardiovascular:      Rate and Rhythm: Normal rate and regular rhythm.      Pulses: Normal pulses.      Heart sounds: Normal heart sounds. No murmur heard.     No friction rub. No gallop.   Pulmonary:      Effort: Pulmonary effort is normal. No respiratory distress.      Breath sounds: No wheezing, rhonchi or rales.   Abdominal:      General: Bowel sounds are normal.      Palpations: Abdomen is soft.      Tenderness: There is abdominal tenderness (RLQ). There is no right CVA tenderness, left CVA tenderness or rebound.   Musculoskeletal:      Cervical back: Neck supple.      Right lower leg: No edema.      Left lower leg: No edema.   Skin:     General: Skin is warm and dry.   Neurological:      General: No focal deficit present.      Mental Status: She is alert and oriented to person, place, and time.   Psychiatric:         Mood and Affect: Mood normal.         Behavior: Behavior normal.        Result Review :          Procedures      Assessment and Plan    Diagnoses and all orders for this visit:    1. Annual physical exam (Primary)  -     Lipid Panel    2. Insomnia, unspecified type  Comments:  Checking labs today.  She will continue to work on sleep hygiene and use trazodone as needed for sleep.  Orders:  -     TSH    3. Major depressive disorder, recurrent, mild    4. RLQ abdominal pain  Comments:  Follow-up as scheduled with GYN.  She will call with any new or worsening symptoms.  Orders:  -     CBC Auto Differential  -     Comprehensive Metabolic Panel  -     Amylase  -     Lipase    5. Vitamin D deficiency  -     Vitamin D,25-Hydroxy    6. EDEN (generalized anxiety disorder)    Other orders  -      traZODone (DESYREL) 50 MG tablet; Take 1 tablet by mouth Every Night.  Dispense: 90 tablet; Refill: 1  -     vitamin D (ERGOCALCIFEROL) 1.25 MG (05513 UT) capsule capsule; Take 1 capsule by mouth 1 (One) Time Per Week.  Dispense: 13 capsule; Refill: 1  -     tiZANidine (ZANAFLEX) 2 MG tablet; Take 1 tablet by mouth Every 8 (Eight) Hours As Needed for Muscle Spasms.  Dispense: 30 tablet; Refill: 0    Discussed anxiety and depression symptoms at length today.  Discussed medication options as well as recommendation for counseling.  At this time she does not wish to try any medications for this and feels like things are likely going to improve.  Low suspicion that she will do counseling given negative experience when she was young. Will f/u to reassess in 2 mth        19 to 39: Counseling/Anticipatory Guidance Discussed: nutrition, family planning/contraception, physical activity, healthy weight, injury prevention, mental health, and immunizations      Follow Up   Return in about 2 months (around 1/21/2024).  Patient was given instructions and counseling regarding her condition or for health maintenance advice. Please see specific information pulled into the AVS if appropriate.        Name band;

## 2023-11-28 ENCOUNTER — TELEPHONE (OUTPATIENT)
Dept: INTERNAL MEDICINE | Facility: CLINIC | Age: 38
End: 2023-11-28

## 2023-11-28 NOTE — TELEPHONE ENCOUNTER
Caller: Minh Drake    Relationship: Self    Best call back number: [phone]     What form or medical record are you requesting: [employer]    Who is requesting this form or medical record from you: EMPLOYER    How would you like to receive the form or medical records (pick-up, mail, fax): MADE AVAILABLE ON [employer]    Timeframe paperwork needed: AS SOON AS POSSIBLE    Additional notes: PATIENT WAS IN THE OFFICE ON 11.21.2023 FOR APPOINTMENT AND FOR LABS. PATIENT GAVE PAPERWORK TO NATHAN CAICEDO DURING APPOINTMENT AND WAS ADVISED THAT IT WOULD BE FILLED OUT AND READY TO  THE NEXT DAY. PATIENT NEVER RECEIVED A PHONE CALL LETTING HER KNOW WHEN IT WAS READY TO . PATIENT WANTING TO HAVE PAPERWORK MADE AVAILABLE FOR HER TO DOWNLOAD ON [employer] IF POSSIBLE. PLEASE ADVISE.       PATIENT STATES THAT SHE ASKED THE NURSE ABOUT COVID VACCINATION OPINION. WAS EXPECTED TO GET SOME FEEDBACK FROM NURSE VIA [facility] OR PHONE CALL AND PATIENT HAS NOT HEARD ANYTHING BACK ABOUT IT. PATIENT WANTING A PHONE CALL OR MESSAGE TO ADVISE ON WHETHER OR NOT HER AND HER  SHOULD GET THE LATEST BOOSTER VACCINE.

## 2023-11-30 ENCOUNTER — PATIENT MESSAGE (OUTPATIENT)
Dept: INTERNAL MEDICINE | Facility: CLINIC | Age: 38
End: 2023-11-30
Payer: COMMERCIAL

## 2023-11-30 NOTE — TELEPHONE ENCOUNTER
PLEASE FAX PAPERWORK -[phone]    PATIENT STATES THE [employer]M IS THE NAME OF THE PAPERWORK TO BE FAXED    [phone] IS HER NUMBER IF YOU NEED TO CALL HER

## 2023-12-05 RX ORDER — HYDROXYZINE HYDROCHLORIDE 25 MG/1
25 TABLET, FILM COATED ORAL
Qty: 90 TABLET | Refills: 1 | Status: SHIPPED | OUTPATIENT
Start: 2023-12-05

## 2023-12-05 NOTE — TELEPHONE ENCOUNTER
From: Minh Drake  To: Jackelyn Helton  Sent: 11/30/2023 9:11 PM EST  Subject: Prescription Refill Request & COVID Vaccination Input    Ms. Hayden,    I am sorry but I failed to request refills of Hydroxyzine HCL 25ml tablet during my annual visit. My skin is still not well and I need to take it almost daily. I am using the last refill on the current prescription as it just ran out last weekend.     Additionally I would like advice on the COVID vaccination shots. My record in My Chart is up to date and it has been a while since I took the last one. I have noticed COVID is around again and I was hoping to learn your opinion on getting another shot. Thank you very much in advance.    Minh Drake

## 2023-12-07 ENCOUNTER — OFFICE VISIT (OUTPATIENT)
Dept: GASTROENTEROLOGY | Facility: CLINIC | Age: 38
End: 2023-12-07
Payer: COMMERCIAL

## 2023-12-07 ENCOUNTER — HOSPITAL ENCOUNTER (OUTPATIENT)
Dept: ULTRASOUND IMAGING | Facility: HOSPITAL | Age: 38
Discharge: HOME | End: 2023-12-07
Admitting: NURSE PRACTITIONER
Payer: COMMERCIAL

## 2023-12-07 VITALS
SYSTOLIC BLOOD PRESSURE: 126 MMHG | DIASTOLIC BLOOD PRESSURE: 81 MMHG | HEART RATE: 92 BPM | HEIGHT: 64 IN | BODY MASS INDEX: 30.66 KG/M2 | WEIGHT: 179.6 LBS

## 2023-12-07 DIAGNOSIS — R19.7 DIARRHEA, UNSPECIFIED TYPE: ICD-10-CM

## 2023-12-07 DIAGNOSIS — K76.0 FATTY LIVER: ICD-10-CM

## 2023-12-07 DIAGNOSIS — R79.89 ELEVATED FERRITIN: ICD-10-CM

## 2023-12-07 DIAGNOSIS — E66.9 CLASS 1 OBESITY WITH SERIOUS COMORBIDITY AND BODY MASS INDEX (BMI) OF 31.0 TO 31.9 IN ADULT, UNSPECIFIED OBESITY TYPE: ICD-10-CM

## 2023-12-07 DIAGNOSIS — R10.31 RIGHT LOWER QUADRANT ABDOMINAL PAIN: ICD-10-CM

## 2023-12-07 DIAGNOSIS — R10.2 PELVIC PAIN: ICD-10-CM

## 2023-12-07 DIAGNOSIS — R79.89 ELEVATED LFTS: Primary | ICD-10-CM

## 2023-12-07 PROCEDURE — 76830 TRANSVAGINAL US NON-OB: CPT

## 2023-12-07 PROCEDURE — 76856 US EXAM PELVIC COMPLETE: CPT

## 2023-12-07 RX ORDER — VITAMIN E 268 MG
400 CAPSULE ORAL 2 TIMES DAILY
Qty: 60 CAPSULE | Refills: 5 | Status: SHIPPED | OUTPATIENT
Start: 2023-12-07 | End: 2023-12-11

## 2023-12-07 NOTE — PROGRESS NOTES
"Well Woman Visit    CC: Scheduled annual well gyn visit  Chief Complaint   Patient presents with    Gynecologic Exam       [facility] intake in the past?: Yes    DATE PERFORMED: 2021 Result: [place] Lifetime breast cancer risk >20% 22.8%    Contraception:  NuvaRing used continuously  PDF Report (2022 13:56)  HPI:   38 y.o.     Menses:   q 0 days, lasts 0 days, changes products q 0 hrs on heaviest days.     Pain:  Mild, OTC meds control discomfort    PCP: does manage PMHx and preventative labs  History: PMHx, Meds, Allergies, PSHx, Social Hx, and POBHx all reviewed and updated.    Pt has no complaints today.    PHYSICAL EXAM:  /81   Pulse 81   Ht 161.3 cm (63.5\")   Wt 79.5 kg (175 lb 3.2 oz)   LMP  (LMP Unknown) Comment: Ring  BMI 30.55 kg/m²  Not found.  General- NAD, alert and oriented, appropriate  Psych- Normal mood, good memory  Neck- No masses, no thyroid enlargement  CV- Regular rhythm, no murnurs  Resp- CTA to bases, no wheezes  Abdomen- Soft, non distended, non tender, no masses    Breast left-  Bilaterally symmetrical, no masses, non tender, no nipple discharge  Breast right- Bilaterally symmetrical, no masses, non tender, no nipple discharge    External genitalia- Normal female, no lesions  Urethra/meatus- Normal, no masses, non tender  Bladder- Normal, no masses, non tender  Vagina- Normal, no atrophy, no lesions, no discharge.    Cvx- Normal, no lesions, no discharge, No cervical motion tenderness  Uterus- Normal size, shape & consistency.  Non tender, mobile, & no prolapse  Adnexa- No mass, non tender  Anus/Rectum/Perineum- Not performed    Lymphatic- No palpable neck, axillary, or groin nodes  Ext- No edema, no cyanosis    Skin- No lesions, no rashes, no acanthosis nigricans    US Pelvis Transvaginal Non OB (2023 12:48)  ASSESSMENT and PLAN:    Diagnoses and all orders for this visit:    1. Well woman exam with routine gynecological exam (Primary)    2. Encounter for " surveillance of vaginal ring hormonal contraceptive device  -     etonogestrel-ethinyl estradiol (EluRyng) 0.12-0.015 MG/24HR vaginal ring; INSERT 1 RING VAGINALLY ON 1ST, AND REMOVE RING ON 31ST  Dispense: 1 each; Refill: 11    3. Endocervical polyp  Assessment & Plan:  Patient recently had an appointment for pelvic pain.  Her pelvic pain spontaneous resolved.  Patient had a transvaginal ultrasound that showed a possible endocervical polyp measuring 5 x 7 mm.  Will repeat pelvic ultrasound in 6 weeks.    Orders:  -     US Pelvis Transvaginal Non OB; Future    4. Encounter for screening mammogram for malignant neoplasm of breast  -     Mammo Screening Digital Tomosynthesis Bilateral With CAD; Future        Preventative:  BREAST HEALTH- Monthly self breast exam importance and how to reviewed. MMG and/or MRI (prn) reviewed per society guidelines and her individual history. Screen: Pt will call to schedule  CERVICAL CANCER Screening- Reviewed current ASCCP guidelines for screening w and wo cotest HPV, age specific.  Screen: Updated today  COLON CANCER Screening- Reviewed current medical society guidelines and options.  Screen:  Not medically needed  Follow up PCP/Specialist PMHx and/or Labs      She understands the importance of having any ordered tests to be performed in a timely fashion.  The risks of not performing them include, but are not limited to, advanced cancer stages, bone loss from osteoporosis and/or subsequent increase in morbidity and/or mortality.  She is encouraged to review her results online and/or contact or office if she has questions.     Follow Up:  Return for post ultrasound.            Emily Escudero MD  12/11/2023    [facility]  [address] [place] [address]  Dept: [phone]  Dept Fax: [phone]  Loc: [phone]  Loc Fax: [phone]

## 2023-12-07 NOTE — PROGRESS NOTES
Patient Name: Minh Drake   Visit Date: 12/07/2023   Patient ID: [id number]  Provider: KYUNG Aguilar    Sex: female  Location:  Location Address:  Location Phone: [address]  [place] [address] [phone]    YOB: 1985  Age: 38 y.o.   Primary Care Provider Jackelyn Helton APRN      Referring Provider: No ref. provider found        Chief Complaint  Fatty Liver and Diarrhea (Improved since last visit (had an episode of diarrhea last night).)    History of Present Illness    Patient initially presented 9/15/2022 with elevated LFTs, reported having alcohol maybe once a month.  Ultrasound of the liver in August showed fatty liver and common bile duct 6.4 mm with gallbladder, no gallstones seen  MRI of the abdomen with and without contrast MRCP 10/14/2022: Significant fatty liver, enlarged to 21 cm, CBD 4 mm, trace bilateral pleural effusions-patient advised to follow-up with primary care for this     Liver work-up negative other than ferritin 842, iron saturation was normal, iron was normal, as a precaution hemochromatosis gene test--  1 gene detected, heterozygous finding usually not associated with increased risk of clinical symptoms     Patient was last seen 5/8/2023, she had gained 3 pounds since visit, reported an episode of diarrhea for 7 to 10 days and abdominal pain that started after drinking red wine she also had temp of 100 with episode but reported everything resolved    Liver ultrasound 10/16/2023: Fatty liver  11/21/2023: ALT 47, AST 66-previously ALT 33 AST 37 on 11/3  11/21/2023: CBC unremarkable, INR 1.0    Pt states rarely having diarrhea, pt states int pain in RLQ, almost points to right groin - states seeing GYN and has pelvic US set up today.   Pt states sometimes when she feels stressed she has pain throughout her body and has diarrhea.   Denies pain w meals or assoc w BM's  No ETOH since April.   Pt has gained 4# since last visit  Pt concerned about elev  "LFTs      Past Medical History:   Diagnosis Date    Allergic rhinitis     Asthma     4th grade    Broken bones     Elementary    Migraine headache     Sinus trouble        Past Surgical History:   Procedure Laterality Date    WISDOM TOOTH EXTRACTION         Allergies   Allergen Reactions    Brompheniramine-Pseudoeph Rash    Cephalexin Rash       Family History   Problem Relation Age of Onset    Prostate cancer Father     Other Mother         Cerebrovascular Accident    Breast cancer Sister 44    Colon cancer Neg Hx     Ovarian cancer Neg Hx     Uterine cancer Neg Hx         Social History     Tobacco Use    Smoking status: Never    Smokeless tobacco: Never   Vaping Use    Vaping Use: Never used   Substance Use Topics    Alcohol use: Not Currently     Comment: rarely    Drug use: Never       Objective     Vital Signs:   /81 (BP Location: Left arm, Patient Position: Sitting, Cuff Size: Small Adult)   Pulse 92   Ht 161.3 cm (63.5\")   Wt 81.5 kg (179 lb 9.6 oz)   BMI 31.32 kg/m²       Physical Exam  Constitutional:       General: The patient is not in acute distress.     Appearance: Normal appearance.   HENT:      Head: Normocephalic and atraumatic.      Nose: Nose normal.   Pulmonary:      Effort: Pulmonary effort is normal. No respiratory distress.   Abdominal:      General: Abdomen is flat.      Palpations: Abdomen is soft. There is no mass.      Tenderness: There is no abdominal tenderness. There is no guarding.   Musculoskeletal:      Cervical back: Neck supple.      Right lower leg: No edema.      Left lower leg: No edema.   Skin:     General: Skin is warm and dry.   Neurological:      General: No focal deficit present.      Mental Status: The patient is alert and oriented to person, place, and time.      Gait: Gait normal.   Psychiatric:         Mood and Affect: Mood normal.         Speech: Speech normal.         Behavior: Behavior normal.         Thought Content: Thought content normal.     Result " Review :   The following data was reviewed by: KYUNG Aguilar on 12/07/2023:    CBC w/diff          7/28/2023    09:23 11/3/2023    08:20 11/21/2023    08:39   CBC w/Diff   WBC 10.87  9.70  8.94    RBC 4.54  4.61  4.57    Hemoglobin 13.5  14.1  13.8    Hematocrit 40.4  41.2  41.5    MCV 89.0  89.4  90.8    MCH 29.7  30.6  30.2    MCHC 33.4  34.2  33.3    RDW 11.9  11.7  11.9    Platelets 297  327  299    Neutrophil Rel % 51.7   56.7    Immature Granulocyte Rel % 0.4   0.2    Lymphocyte Rel % 37.2   32.0    Monocyte Rel % 5.7   4.9    Eosinophil Rel % 4.0   5.1    Basophil Rel % 1.0   1.1      CMP          7/28/2023    09:23 11/3/2023    08:20 11/21/2023    08:39   CMP   Glucose 85   110    BUN 8   9    Creatinine 0.71   0.73    EGFR 113.2   108.8    Sodium 140   140    Potassium 4.4   4.5    Chloride 106   104    Calcium 9.5   10.0    Total Protein 7.2  7.9  7.6    Albumin 4.3  4.5  4.7    Globulin 2.9   2.9    Total Bilirubin 0.3  0.2  0.2    Alkaline Phosphatase 47  55  57    AST (SGOT) 59  37  66    ALT (SGPT) 43  33  47    Albumin/Globulin Ratio 1.5   1.6    BUN/Creatinine Ratio 11.3   12.3    Anion Gap 12.8   12.0        Liver Workup   A-1 Antitrypsin   Date Value Ref Range Status   09/15/2022 211 (H) 100 - 188 mg/dL Final     dsDNA   Date Value Ref Range Status   09/15/2022 Negative Negative Final     Expanded ANSHU Screen   Date Value Ref Range Status   09/15/2022 Negative Negative Final     Smooth Muscle Ab   Date Value Ref Range Status   09/15/2022 9 0 - 19 Units Final     Comment:                      Negative                     0 - 19                   Weak positive               20 - 30                   Moderate to strong positive     >30   Actin Antibodies are found in 52-85% of patients with   autoimmune hepatitis or chronic active hepatitis and   in 22% of patients with primary biliary cirrhosis.     Ceruloplasmin   Date Value Ref Range Status   09/15/2022 41 (H) 19 - 39 mg/dL Final      Ferritin   Date Value Ref Range Status   01/03/2023 718.00 (H) 13.00 - 150.00 ng/mL Final     IgG   Date Value Ref Range Status   09/15/2022 1343 586 - 1602 mg/dL Final     IgA   Date Value Ref Range Status   09/15/2022 219 87 - 352 mg/dL Final     IgM   Date Value Ref Range Status   09/15/2022 65 26 - 217 mg/dL Final     Iron   Date Value Ref Range Status   01/03/2023 73 37 - 145 mcg/dL Final     TIBC   Date Value Ref Range Status   01/03/2023 532 298 - 536 mcg/dL Final     Iron Saturation (TSAT)   Date Value Ref Range Status   01/03/2023 14 (L) 20 - 50 % Final     Transferrin   Date Value Ref Range Status   01/03/2023 357 200 - 360 mg/dL Final     Mitochondrial Ab   Date Value Ref Range Status   09/15/2022 <20.0 0.0 - 20.0 Units Final     Comment:                                     Negative    0.0 - 20.0                                  Equivocal  20.1 - 24.9                                  Positive         >24.9  Mitochondrial (M2) Antibodies are found in 90-96% of  patients with primary biliary cirrhosis.     Protime   Date Value Ref Range Status   11/03/2023 13.7 11.8 - 14.9 Seconds Final     INR   Date Value Ref Range Status   11/03/2023 1.04 0.86 - 1.15 Final     ALPHA-FETOPROTEIN   Date Value Ref Range Status   09/15/2022 2.86 0 - 8.3 ng/mL Final     Acute HEP Panel   Hepatitis B Surface Ag   Date Value Ref Range Status   09/15/2022 Non-Reactive Non-Reactive Final     Hep A IgM   Date Value Ref Range Status   09/15/2022 Non-Reactive Non-Reactive Final     Hep B C IgM   Date Value Ref Range Status   09/15/2022 Non-Reactive Non-Reactive Final     Hepatitis C Ab   Date Value Ref Range Status   09/15/2022 Non-Reactive Non-Reactive Final               Assessment and Plan    Diagnoses and all orders for this visit:    1. Elevated LFTs (Primary)  -     CBC (No Diff); Future  -     Hepatic Function Panel; Future  -     Protime-INR; Future  -     US Liver; Future  -     Iron Profile; Future  -     Ferritin;  Future    2. Fatty liver  -     CBC (No Diff); Future  -     Hepatic Function Panel; Future  -     Protime-INR; Future  -     US Liver; Future  -     Iron Profile; Future  -     Ferritin; Future    3. Diarrhea, unspecified type  -     Case Request; Standing  -     Case Request    4. Class 1 obesity with serious comorbidity and body mass index (BMI) of 31.0 to 31.9 in adult, unspecified obesity type    5. Elevated ferritin    6. Right lower quadrant abdominal pain  -     Case Request; Standing  -     Case Request    Other orders  -     vitamin E 400 UNIT capsule; Take 1 capsule by mouth 2 (Two) Times a Day.  Dispense: 60 capsule; Refill: 5  -     polyethylene glycol (GoLYTELY) 236 g solution; Take per office instructions  Dispense: 4000 mL; Refill: 0  -     hyoscyamine (LEVSIN) 0.125 MG SL tablet; Take 1 tablet by mouth Every 4 (Four) Hours As Needed for Cramping.  Dispense: 90 tablet; Refill: 1  -     Follow Anesthesia Guidelines / Protocol; Standing  -     Obtain Informed Consent; Standing  -     Follow Anesthesia Guidelines / Protocol; Future  -     Obtain Informed Consent; Future  -     Verify NPO; Standing              Follow Up   Return for f/u after testing.  -Repeat Iron profile, ferritin , re-check LFTs in Jan   -Vit E 400 IU BID   -Liver US April - pt requests May  -Cont low carb diet and pursue weight loss, 2-4 c. Coffee/d  -If pelvic US negative, recommended colonoscopy for RLQ discomfort and intermittent diarrhea. Pt wanted to go ahead and arrange.  Surgical Risk and Benefits: Possible risks/complications, benefits, and alternatives to surgical or invasive procedure have been explained to patient and/or legal guardian; risks include bleeding, infection, and perforation. Patient has been evaluated and can tolerate anesthesia and/or sedation. Risks, benefits, and alternatives to anesthesia and sedation have been explained to patient and/or legal guardian.   -Will try hyoscyamine when pt feels  cramping/diarrhea starting, she mainly associates this w anxiety        Patient was given instructions and counseling regarding her condition or for health maintenance advice. Please see specific information pulled into the AVS if appropriate.

## 2023-12-11 ENCOUNTER — OFFICE VISIT (OUTPATIENT)
Dept: OBSTETRICS AND GYNECOLOGY | Facility: CLINIC | Age: 38
End: 2023-12-11
Payer: COMMERCIAL

## 2023-12-11 ENCOUNTER — ANESTHESIA EVENT (OUTPATIENT)
Dept: GASTROENTEROLOGY | Facility: HOSPITAL | Age: 38
End: 2023-12-11
Payer: COMMERCIAL

## 2023-12-11 VITALS
HEIGHT: 64 IN | DIASTOLIC BLOOD PRESSURE: 81 MMHG | BODY MASS INDEX: 29.91 KG/M2 | HEART RATE: 81 BPM | SYSTOLIC BLOOD PRESSURE: 141 MMHG | WEIGHT: 175.2 LBS

## 2023-12-11 DIAGNOSIS — Z30.44 ENCOUNTER FOR SURVEILLANCE OF VAGINAL RING HORMONAL CONTRACEPTIVE DEVICE: ICD-10-CM

## 2023-12-11 DIAGNOSIS — N84.1 ENDOCERVICAL POLYP: ICD-10-CM

## 2023-12-11 DIAGNOSIS — Z12.31 ENCOUNTER FOR SCREENING MAMMOGRAM FOR MALIGNANT NEOPLASM OF BREAST: ICD-10-CM

## 2023-12-11 DIAGNOSIS — Z01.419 WELL WOMAN EXAM WITH ROUTINE GYNECOLOGICAL EXAM: Primary | ICD-10-CM

## 2023-12-11 PROCEDURE — 99395 PREV VISIT EST AGE 18-39: CPT | Performed by: OBSTETRICS & GYNECOLOGY

## 2023-12-11 RX ORDER — ETONOGESTREL AND ETHINYL ESTRADIOL VAGINAL .015; .12 MG/D; MG/D
RING VAGINAL
Qty: 1 EACH | Refills: 11 | Status: SHIPPED | OUTPATIENT
Start: 2023-12-11

## 2023-12-11 NOTE — ASSESSMENT & PLAN NOTE
Patient recently had an appointment for pelvic pain.  Her pelvic pain spontaneous resolved.  Patient had a transvaginal ultrasound that showed a possible endocervical polyp measuring 5 x 7 mm.  Will repeat pelvic ultrasound in 6 weeks.

## 2023-12-11 NOTE — ANESTHESIA PREPROCEDURE EVALUATION
Anesthesia Evaluation     Patient summary reviewed and Nursing notes reviewed   NPO Solid Status: > 8 hours  NPO Liquid Status: > 6 hours           Airway   Mallampati: I  TM distance: >3 FB  Neck ROM: full  No difficulty expected  Dental - normal exam     Pulmonary - normal exam    breath sounds clear to auscultation  (+) asthma (childhood only - no issues now),  Cardiovascular - normal exam  Exercise tolerance: good (4-7 METS)    Rhythm: regular  Rate: normal        Neuro/Psych  (+) headaches  GI/Hepatic/Renal/Endo      Musculoskeletal     Abdominal    Substance History      OB/GYN          Other      history of cancer                Anesthesia Plan    ASA 2     general   total IV anesthesia  intravenous induction     Anesthetic plan, risks, benefits, and alternatives have been provided, discussed and informed consent has been obtained with: patient.  Pre-procedure education provided  Plan discussed with CRNA.    CODE STATUS:

## 2023-12-12 ENCOUNTER — HOSPITAL ENCOUNTER (OUTPATIENT)
Facility: HOSPITAL | Age: 38
Setting detail: HOSPITAL OUTPATIENT SURGERY
Discharge: HOME | End: 2023-12-12
Attending: INTERNAL MEDICINE | Admitting: INTERNAL MEDICINE
Payer: COMMERCIAL

## 2023-12-12 ENCOUNTER — ANESTHESIA (OUTPATIENT)
Dept: GASTROENTEROLOGY | Facility: HOSPITAL | Age: 38
End: 2023-12-12
Payer: COMMERCIAL

## 2023-12-12 VITALS
DIASTOLIC BLOOD PRESSURE: 75 MMHG | HEART RATE: 86 BPM | OXYGEN SATURATION: 96 % | BODY MASS INDEX: 29.45 KG/M2 | RESPIRATION RATE: 16 BRPM | WEIGHT: 168.87 LBS | TEMPERATURE: 97.7 F | SYSTOLIC BLOOD PRESSURE: 99 MMHG

## 2023-12-12 DIAGNOSIS — R19.7 DIARRHEA, UNSPECIFIED TYPE: ICD-10-CM

## 2023-12-12 LAB — B-HCG UR QL: NEGATIVE

## 2023-12-12 PROCEDURE — 88305 TISSUE EXAM BY PATHOLOGIST: CPT | Performed by: INTERNAL MEDICINE

## 2023-12-12 PROCEDURE — 25810000003 LACTATED RINGERS PER 1000 ML

## 2023-12-12 PROCEDURE — 81025 URINE PREGNANCY TEST: CPT

## 2023-12-12 PROCEDURE — 25010000002 PROPOFOL 10 MG/ML EMULSION: Performed by: NURSE ANESTHETIST, CERTIFIED REGISTERED

## 2023-12-12 RX ORDER — SODIUM CHLORIDE, SODIUM LACTATE, POTASSIUM CHLORIDE, CALCIUM CHLORIDE 600; 310; 30; 20 MG/100ML; MG/100ML; MG/100ML; MG/100ML
30 INJECTION, SOLUTION INTRAVENOUS CONTINUOUS
Status: DISCONTINUED | OUTPATIENT
Start: 2023-12-12 | End: 2023-12-12 | Stop reason: HOSPADM

## 2023-12-12 RX ORDER — LIDOCAINE HYDROCHLORIDE 20 MG/ML
INJECTION, SOLUTION EPIDURAL; INFILTRATION; INTRACAUDAL; PERINEURAL AS NEEDED
Status: DISCONTINUED | OUTPATIENT
Start: 2023-12-12 | End: 2023-12-12 | Stop reason: SURG

## 2023-12-12 RX ORDER — PROPOFOL 10 MG/ML
VIAL (ML) INTRAVENOUS AS NEEDED
Status: DISCONTINUED | OUTPATIENT
Start: 2023-12-12 | End: 2023-12-12 | Stop reason: SURG

## 2023-12-12 RX ADMIN — PROPOFOL 100 MG: 10 INJECTION, EMULSION INTRAVENOUS at 14:40

## 2023-12-12 RX ADMIN — PROPOFOL 225 MCG/KG/MIN: 10 INJECTION, EMULSION INTRAVENOUS at 14:40

## 2023-12-12 RX ADMIN — SODIUM CHLORIDE, POTASSIUM CHLORIDE, SODIUM LACTATE AND CALCIUM CHLORIDE 30 ML/HR: 600; 310; 30; 20 INJECTION, SOLUTION INTRAVENOUS at 13:54

## 2023-12-12 RX ADMIN — LIDOCAINE HYDROCHLORIDE 50 MG: 20 INJECTION, SOLUTION EPIDURAL; INFILTRATION; INTRACAUDAL; PERINEURAL at 14:40

## 2023-12-12 NOTE — H&P
Pre Procedure History & Physical    Chief Complaint:   Chronic diarrhea    Subjective     HPI:   Chronic diarrhea    Past Medical History:   Past Medical History:   Diagnosis Date    Allergic rhinitis     Asthma     4th grade    Broken bones     Elementary    Migraine headache     Sinus trouble        Past Surgical History:  Past Surgical History:   Procedure Laterality Date    WISDOM TOOTH EXTRACTION         Family History:  Family History   Problem Relation Age of Onset    Prostate cancer Father     Other Mother         Cerebrovascular Accident    Breast cancer Sister 44    Colon cancer Neg Hx     Ovarian cancer Neg Hx     Uterine cancer Neg Hx        Social History:   reports that she has never smoked. She has never used smokeless tobacco. She reports that she does not currently use alcohol. She reports that she does not use drugs.    Medications:   Medications Prior to Admission   Medication Sig Dispense Refill Last Dose    cetirizine (zyrTEC) 10 MG tablet Take 1 tablet by mouth Daily. 90 tablet 1     etonogestrel-ethinyl estradiol (EluRyng) 0.12-0.015 MG/24HR vaginal ring INSERT 1 RING VAGINALLY ON 1ST, AND REMOVE RING ON 31ST 1 each 11     hydrOXYzine (ATARAX) 25 MG tablet Take 1 tablet by mouth every night at bedtime. 90 tablet 1     hyoscyamine (LEVSIN) 0.125 MG SL tablet Take 1 tablet by mouth Every 4 (Four) Hours As Needed for Cramping. (Patient not taking: Reported on 12/11/2023) 90 tablet 1     traZODone (DESYREL) 50 MG tablet Take 1 tablet by mouth Every Night. 90 tablet 1     triamcinolone (KENALOG) 0.1 % cream        vitamin D (ERGOCALCIFEROL) 1.25 MG (64454 UT) capsule capsule Take 1 capsule by mouth 1 (One) Time Per Week. 13 capsule 1        Allergies:  Brompheniramine-pseudoeph and Cephalexin        Objective     Weight 76.6 kg (168 lb 14 oz), not currently breastfeeding.    Physical Exam   Constitutional: Pt is oriented to person, place, and time and well-developed, well-nourished, and in no  distress.   Mouth/Throat: Oropharynx is clear and moist.   Neck: Normal range of motion.   Cardiovascular: Normal rate, regular rhythm and normal heart sounds.    Pulmonary/Chest: Effort normal and breath sounds normal.   Abdominal: Soft. Nontender  Skin: Skin is warm and dry.   Psychiatric: Mood, memory, affect and judgment normal.     Assessment & Plan     Diagnosis:  Chronic diarrhea    Anticipated Surgical Procedure:  Colonoscopy    The risks, benefits, and alternatives of this procedure have been discussed with the patient or the responsible party- the patient understands and agrees to proceed.

## 2023-12-12 NOTE — ANESTHESIA POSTPROCEDURE EVALUATION
Patient: Minh Drake    Procedure Summary       Date: 12/12/23 Room / Location: [facility] 4 / [facility]    Anesthesia Start: 1436 Anesthesia Stop: 1501    Procedure: COLONOSCOPY WITH BIOPSIES Diagnosis:       Diarrhea, unspecified type      (Diarrhea, unspecified type [R19.7])    Surgeons: Tee Melo MD Provider: Senthil Romano CRNA    Anesthesia Type: general ASA Status: 2            Anesthesia Type: general    Vitals  Vitals Value Taken Time   /71 12/12/23 1525   Temp 36.5 °C (97.7 °F) 12/12/23 1500   Pulse 84 12/12/23 1527   Resp 16 12/12/23 1520   SpO2 97 % 12/12/23 1527   Vitals shown include unfiled device data.        Post Anesthesia Care and Evaluation    Patient location during evaluation: bedside  Patient participation: complete - patient participated  Level of consciousness: awake  Pain score: 0  Pain management: adequate    Airway patency: patent  Anesthetic complications: No anesthetic complications  PONV Status: controlled  Cardiovascular status: acceptable and stable  Respiratory status: acceptable, spontaneous ventilation and room air  Hydration status: acceptable

## 2023-12-14 LAB
CYTO UR: NORMAL
LAB AP CASE REPORT: NORMAL
LAB AP CLINICAL INFORMATION: NORMAL
PATH REPORT.FINAL DX SPEC: NORMAL
PATH REPORT.GROSS SPEC: NORMAL

## 2023-12-22 ENCOUNTER — HOSPITAL ENCOUNTER (OUTPATIENT)
Dept: MAMMOGRAPHY | Facility: HOSPITAL | Age: 38
Discharge: HOME | End: 2023-12-22
Admitting: OBSTETRICS & GYNECOLOGY
Payer: COMMERCIAL

## 2023-12-22 DIAGNOSIS — Z12.31 ENCOUNTER FOR SCREENING MAMMOGRAM FOR MALIGNANT NEOPLASM OF BREAST: ICD-10-CM

## 2023-12-22 PROCEDURE — 77067 SCR MAMMO BI INCL CAD: CPT

## 2023-12-22 PROCEDURE — 77063 BREAST TOMOSYNTHESIS BI: CPT

## 2023-12-29 ENCOUNTER — LAB (OUTPATIENT)
Dept: LAB | Facility: HOSPITAL | Age: 38
End: 2023-12-29
Payer: COMMERCIAL

## 2023-12-29 DIAGNOSIS — K76.0 FATTY LIVER: ICD-10-CM

## 2023-12-29 DIAGNOSIS — R79.89 ELEVATED LFTS: ICD-10-CM

## 2023-12-29 LAB
ALBUMIN SERPL-MCNC: 4.3 G/DL (ref 3.5–5.2)
ALP SERPL-CCNC: 53 U/L (ref 39–117)
ALT SERPL W P-5'-P-CCNC: 28 U/L (ref 1–33)
AST SERPL-CCNC: 35 U/L (ref 1–32)
BILIRUB CONJ SERPL-MCNC: <0.2 MG/DL (ref 0–0.3)
BILIRUB INDIRECT SERPL-MCNC: ABNORMAL MG/DL
BILIRUB SERPL-MCNC: <0.2 MG/DL (ref 0–1.2)
DEPRECATED RDW RBC AUTO: 38 FL (ref 37–54)
ERYTHROCYTE [DISTWIDTH] IN BLOOD BY AUTOMATED COUNT: 11.7 % (ref 12.3–15.4)
FERRITIN SERPL-MCNC: 664 NG/ML (ref 13–150)
HCT VFR BLD AUTO: 38 % (ref 34–46.6)
HGB BLD-MCNC: 13 G/DL (ref 12–15.9)
INR PPP: 0.9 (ref 0.86–1.15)
IRON 24H UR-MRATE: 132 MCG/DL (ref 37–145)
IRON SATN MFR SERPL: 25 % (ref 20–50)
MCH RBC QN AUTO: 30.7 PG (ref 26.6–33)
MCHC RBC AUTO-ENTMCNC: 34.2 G/DL (ref 31.5–35.7)
MCV RBC AUTO: 89.6 FL (ref 79–97)
PLATELET # BLD AUTO: 285 10*3/MM3 (ref 140–450)
PMV BLD AUTO: 9.9 FL (ref 6–12)
PROT SERPL-MCNC: 7.7 G/DL (ref 6–8.5)
PROTHROMBIN TIME: 12.3 SECONDS (ref 11.8–14.9)
RBC # BLD AUTO: 4.24 10*6/MM3 (ref 3.77–5.28)
TIBC SERPL-MCNC: 529 MCG/DL (ref 298–536)
TRANSFERRIN SERPL-MCNC: 355 MG/DL (ref 200–360)
WBC NRBC COR # BLD AUTO: 10.13 10*3/MM3 (ref 3.4–10.8)

## 2023-12-29 PROCEDURE — 36415 COLL VENOUS BLD VENIPUNCTURE: CPT

## 2023-12-29 PROCEDURE — 83540 ASSAY OF IRON: CPT

## 2023-12-29 PROCEDURE — 85610 PROTHROMBIN TIME: CPT

## 2023-12-29 PROCEDURE — 85027 COMPLETE CBC AUTOMATED: CPT

## 2023-12-29 PROCEDURE — 84466 ASSAY OF TRANSFERRIN: CPT

## 2023-12-29 PROCEDURE — 82728 ASSAY OF FERRITIN: CPT

## 2023-12-29 PROCEDURE — 80076 HEPATIC FUNCTION PANEL: CPT

## 2024-01-10 NOTE — PROGRESS NOTES
GYN Visit    CC: Endocervical polyp    HPI:   38 y.o. Contraception or HRT: Contraception:  NuvaRing      Pt has no complaints today.          History: PMHx, Meds, Allergies, PSHx, Social Hx, and POBHx all reviewed and updated.  Physical Exam   PHYSICAL EXAM:  /85   Pulse 83   Wt 77.1 kg (170 lb)   Breastfeeding No   BMI 29.75 kg/m²   General- NAD, alert and oriented, appropriate  Psych- Normal mood, good memory      ASSESSMENT AND PLAN:  Diagnoses and all orders for this visit:    1. Endocervical polyp (Primary)  Assessment & Plan:  Patient has not had any abnormal uterine bleeding today on transvaginal ultrasound today there is a 3 mm endometrial lining there are some very small possible endocervical polyps.  No further intervention is warranted at this time                  Follow Up:  Return for Annual physical.          Emily Escudero MD  2024

## 2024-01-11 ENCOUNTER — OFFICE VISIT (OUTPATIENT)
Dept: OBSTETRICS AND GYNECOLOGY | Facility: CLINIC | Age: 39
End: 2024-01-11
Payer: COMMERCIAL

## 2024-01-11 VITALS
BODY MASS INDEX: 29.75 KG/M2 | DIASTOLIC BLOOD PRESSURE: 85 MMHG | HEART RATE: 83 BPM | WEIGHT: 170 LBS | SYSTOLIC BLOOD PRESSURE: 122 MMHG

## 2024-01-11 DIAGNOSIS — N84.1 ENDOCERVICAL POLYP: Primary | ICD-10-CM

## 2024-01-11 NOTE — ASSESSMENT & PLAN NOTE
Patient has not had any abnormal uterine bleeding today on transvaginal ultrasound today there is a 3 mm endometrial lining there are some very small possible endocervical polyps.  No further intervention is warranted at this time

## 2024-01-22 ENCOUNTER — OFFICE VISIT (OUTPATIENT)
Dept: INTERNAL MEDICINE | Facility: CLINIC | Age: 39
End: 2024-01-22
Payer: COMMERCIAL

## 2024-01-22 VITALS
DIASTOLIC BLOOD PRESSURE: 80 MMHG | WEIGHT: 173.2 LBS | HEIGHT: 63 IN | TEMPERATURE: 97.7 F | RESPIRATION RATE: 14 BRPM | BODY MASS INDEX: 30.69 KG/M2 | OXYGEN SATURATION: 98 % | SYSTOLIC BLOOD PRESSURE: 108 MMHG | HEART RATE: 90 BPM

## 2024-01-22 DIAGNOSIS — J30.9 ALLERGIC RHINITIS, UNSPECIFIED SEASONALITY, UNSPECIFIED TRIGGER: ICD-10-CM

## 2024-01-22 DIAGNOSIS — T14.8XXA MUSCLE STRAIN: Primary | ICD-10-CM

## 2024-01-22 PROCEDURE — 99213 OFFICE O/P EST LOW 20 MIN: CPT | Performed by: NURSE PRACTITIONER

## 2024-01-22 RX ORDER — METHOCARBAMOL 500 MG/1
500 TABLET, FILM COATED ORAL 3 TIMES DAILY PRN
Qty: 60 TABLET | Refills: 0 | Status: SHIPPED | OUTPATIENT
Start: 2024-01-22

## 2024-01-22 RX ORDER — VITAMIN E 268 MG
400 CAPSULE ORAL 2 TIMES DAILY
COMMUNITY

## 2024-01-22 RX ORDER — NAPROXEN SODIUM 550 MG/1
550 TABLET ORAL 2 TIMES DAILY WITH MEALS
COMMUNITY

## 2024-01-22 RX ORDER — CETIRIZINE HYDROCHLORIDE 10 MG/1
10 TABLET ORAL DAILY
Qty: 90 TABLET | Refills: 1 | Status: SHIPPED | OUTPATIENT
Start: 2024-01-22

## 2024-01-22 NOTE — PROGRESS NOTES
"Chief Complaint  Follow-up (2 month follow up) and Upper body muscle ache    Subjective          Minh Drake presents to [facility]  History of Present Illness  Upper body muscle aches in the last few months. She reports shoulders and sides of neck. Worsened in dec/jan. She reports seems to have worsened with work load  She denies any heavy lifting. 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 She has tried ice, naproxen.   She has tried message therapy  Has not tried muscle relaxer  She has been doing some exercises at home    Sinus drainage ongoing  Ear pain improved  Objective   Vital Signs:   /80 (BP Location: Left arm, Patient Position: Sitting, Cuff Size: Adult)   Pulse 90   Temp 97.7 °F (36.5 °C) (Temporal)   Resp 14   Ht 161 cm (63.39\")   Wt 78.6 kg (173 lb 3.2 oz)   SpO2 98%   BMI 30.31 kg/m²     Physical Exam  Vitals and nursing note reviewed.   Constitutional:       General: She is not in acute distress.     Appearance: Normal appearance.   HENT:      Head: Normocephalic and atraumatic.      Right Ear: Tympanic membrane, ear canal and external ear normal.      Left Ear: Tympanic membrane, ear canal and external ear normal.      Nose: Nose normal. Rhinorrhea present.      Mouth/Throat:      Mouth: Mucous membranes are moist.      Pharynx: No posterior oropharyngeal erythema.   Eyes:      Conjunctiva/sclera: Conjunctivae normal.   Cardiovascular:      Rate and Rhythm: Normal rate and regular rhythm.      Pulses: Normal pulses.      Heart sounds: Normal heart sounds. No murmur heard.     No friction rub. No gallop.   Pulmonary:      Effort: Pulmonary effort is normal. No respiratory distress.      Breath sounds: No wheezing, rhonchi or rales.   Musculoskeletal:      Cervical back: Normal and neck supple.      Thoracic back: Normal.        Back:       Right lower leg: No edema.      Left lower leg: No edema.      Comments: Muscle tenderness "   Lymphadenopathy:      Cervical: No cervical adenopathy.   Skin:     General: Skin is warm and dry.   Neurological:      General: No focal deficit present.      Mental Status: She is alert and oriented to person, place, and time.   Psychiatric:         Mood and Affect: Mood normal.         Behavior: Behavior normal.        Result Review :          Procedures      Assessment and Plan    Diagnoses and all orders for this visit:    1. Muscle strain (Primary)  Comments:  will try robaxin and cont naproxen. recommend PT at home at this time.ice/heat as needed    2. Allergic rhinitis, unspecified seasonality, unspecified trigger  Comments:  continue zyrtec    Other orders  -     methocarbamol (ROBAXIN) 500 MG tablet; Take 1 tablet by mouth 3 (Three) Times a Day As Needed for Muscle Spasms.  Dispense: 60 tablet; Refill: 0  -     cetirizine (zyrTEC) 10 MG tablet; Take 1 tablet by mouth Daily.  Dispense: 90 tablet; Refill: 1              Follow Up   No follow-ups on file.  Patient was given instructions and counseling regarding her condition or for health maintenance advice. Please see specific information pulled into the AVS if appropriate.

## 2024-01-25 ENCOUNTER — TELEPHONE (OUTPATIENT)
Dept: INTERNAL MEDICINE | Facility: CLINIC | Age: 39
End: 2024-01-25
Payer: COMMERCIAL

## 2024-01-25 ENCOUNTER — OFFICE VISIT (OUTPATIENT)
Dept: GASTROENTEROLOGY | Facility: CLINIC | Age: 39
End: 2024-01-25
Payer: COMMERCIAL

## 2024-01-25 VITALS
BODY MASS INDEX: 31.04 KG/M2 | DIASTOLIC BLOOD PRESSURE: 87 MMHG | HEART RATE: 88 BPM | HEIGHT: 63 IN | SYSTOLIC BLOOD PRESSURE: 126 MMHG | WEIGHT: 175.2 LBS

## 2024-01-25 DIAGNOSIS — K76.0 FATTY LIVER: Primary | ICD-10-CM

## 2024-01-25 DIAGNOSIS — E66.9 CLASS 1 OBESITY WITH SERIOUS COMORBIDITY AND BODY MASS INDEX (BMI) OF 30.0 TO 30.9 IN ADULT, UNSPECIFIED OBESITY TYPE: ICD-10-CM

## 2024-01-25 DIAGNOSIS — R79.89 ELEVATED LFTS: ICD-10-CM

## 2024-01-25 NOTE — PROGRESS NOTES
Patient Name: Minh Zhao   Visit Date: 01/25/2024   Patient ID: [id number]  Provider: KYUNG Aguilar    Sex: female  Location:  Location Address:  Location Phone: [phone]  [place] [address] [phone]    YOB: 1985  Age: 38 y.o.   Primary Care Provider Jackelyn Helton APRN      Referring Provider: No ref. provider found        Chief Complaint  Colonoscopy (Follow up) and Fatty Liver    History of Present Illness    Patient initially presented 9/15/2022 with elevated LFTs, reported having alcohol maybe once a month.  Ultrasound of the liver in August 2022 showed fatty liver and common bile duct 6.4 mm with gallbladder, no gallstones seen  MRI of the abdomen with and without contrast MRCP 10/14/2022: Significant fatty liver, enlarged to 21 cm, CBD 4 mm, trace bilateral pleural effusions-patient advised to follow-up with primary care for this     Liver work-up negative other than ferritin 842, iron saturation was normal, iron was normal, as a precaution hemochromatosis gene test--  1 gene detected, heterozygous finding usually not associated with increased risk of clinical symptoms    Liver ultrasound 10/16/2023: Fatty liver  11/21/2023: ALT 47, AST 66-previously ALT 33 AST 37 on 11/3  11/21/2023: CBC unremarkable, INR 1.0     Patient was last seen 12/7/2023 and had complaints of intermittent right lower quadrant pain, hyoscyamine was prescribed for this.  Vitamin E twice daily prescribed for fatty liver and elevated LFTs    Colonoscopy 12/12/2023:  Normal colonoscopy, good prep--Random colon biopsies were negative    Pt states RLQ pain has resolved. She did not try Hyoscyamine, states she didn't need. Saw GYN and told negative   Pt has lost 4# since being here. Pt has tried intermittent fasting.   LFTs with some improvement since starting Vit E  Pt has c/o muscle aches and feeling pain throughout her body. States she's seen PCP and plans to  go back.   Past Medical History:  "  Diagnosis Date    Allergic rhinitis     Asthma     4th grade    Broken bones     Elementary    Migraine headache     Sinus trouble        Past Surgical History:   Procedure Laterality Date    COLONOSCOPY N/A 12/12/2023    Procedure: COLONOSCOPY WITH BIOPSIES;  Surgeon: Tee Melo MD;  Location: [facility];  Service: Gastroenterology;  Laterality: N/A;  NORMAL    WISDOM TOOTH EXTRACTION         Allergies   Allergen Reactions    Brompheniramine-Pseudoeph Rash    Cephalexin Rash       Family History   Problem Relation Age of Onset    Prostate cancer Father     Other Mother         Cerebrovascular Accident    Breast cancer Sister 44    Colon cancer Neg Hx     Ovarian cancer Neg Hx     Uterine cancer Neg Hx         Social History     Tobacco Use    Smoking status: Never    Smokeless tobacco: Never   Vaping Use    Vaping Use: Never used   Substance Use Topics    Alcohol use: Never    Drug use: Never       Objective     Vital Signs:   /87 (BP Location: Left arm, Patient Position: Sitting, Cuff Size: Adult)   Pulse 88   Ht 160.9 cm (63.36\")   Wt 79.5 kg (175 lb 3.2 oz)   BMI 30.68 kg/m²       Physical Exam  Constitutional:       General: The patient is not in acute distress.     Appearance: Normal appearance.   HENT:      Head: Normocephalic and atraumatic.      Nose: Nose normal.   Pulmonary:      Effort: Pulmonary effort is normal. No respiratory distress.   Abdominal:      General: Abdomen is flat.      Palpations: Abdomen is soft. There is no mass.      Tenderness: There is no abdominal tenderness. There is no guarding.   Musculoskeletal:      Cervical back: Neck supple.      Right lower leg: No edema.      Left lower leg: No edema.   Skin:     General: Skin is warm and dry.   Neurological:      General: No focal deficit present.      Mental Status: The patient is alert and oriented to person, place, and time.      Gait: Gait normal.   Psychiatric:         Mood and Affect: Mood normal.        "  Speech: Speech normal.         Behavior: Behavior normal.         Thought Content: Thought content normal.     Result Review :   The following data was reviewed by: KYUNG Aguilar on 01/25/2024:    CBC w/diff          11/3/2023    08:20 11/21/2023    08:39 12/29/2023    10:16   CBC w/Diff   WBC 9.70  8.94  10.13    RBC 4.61  4.57  4.24    Hemoglobin 14.1  13.8  13.0    Hematocrit 41.2  41.5  38.0    MCV 89.4  90.8  89.6    MCH 30.6  30.2  30.7    MCHC 34.2  33.3  34.2    RDW 11.7  11.9  11.7    Platelets 327  299  285    Neutrophil Rel %  56.7     Immature Granulocyte Rel %  0.2     Lymphocyte Rel %  32.0     Monocyte Rel %  4.9     Eosinophil Rel %  5.1     Basophil Rel %  1.1       CMP          11/3/2023    08:20 11/21/2023    08:39 12/29/2023    10:16   CMP   Glucose  110     BUN  9     Creatinine  0.73     EGFR  108.8     Sodium  140     Potassium  4.5     Chloride  104     Calcium  10.0     Total Protein 7.9  7.6  7.7    Albumin 4.5  4.7  4.3    Globulin  2.9     Total Bilirubin 0.2  0.2  <0.2    Alkaline Phosphatase 55  57  53    AST (SGOT) 37  66  35    ALT (SGPT) 33  47  28    Albumin/Globulin Ratio  1.6     BUN/Creatinine Ratio  12.3     Anion Gap  12.0         PT/INR   Protime   Date Value Ref Range Status   12/29/2023 12.3 11.8 - 14.9 Seconds Final     INR   Date Value Ref Range Status   12/29/2023 0.90 0.86 - 1.15 Final               Assessment and Plan    Diagnoses and all orders for this visit:    1. Fatty liver (Primary)    2. Class 1 obesity with serious comorbidity and body mass index (BMI) of 30.0 to 30.9 in adult, unspecified obesity type    3. Elevated LFTs  Comments:  improved              Follow Up   Return in about 6 months (around 7/25/2024).  Cont low carb diet and weight loss, 2-4 c. Coffee/d  Liver US May  Labs June  Encouraged pt to f/u w PCP regarding myalgias.     Patient was given instructions and counseling regarding her condition or for health maintenance advice.  Please see specific information pulled into the AVS if appropriate.

## 2024-01-25 NOTE — TELEPHONE ENCOUNTER
Caller: Minh Drake    Relationship: Self    Best call back number: [phone]     What orders are you requesting (i.e. lab or imaging): BLOOD WORK     In what timeframe would the patient need to come in: AS SOON AS POSSIBLE     Where will you receive your lab/imaging services: [facility]     Additional notes: PLEASE CALL AND ADVISE. UPPER BODY AND LOWER BODY IS IN A LOT OF PAIN.

## 2024-01-25 NOTE — TELEPHONE ENCOUNTER
Hub staff attempted to follow warm transfer process and was unsuccessful     Caller: Minh Drake    Relationship to patient: Self    Best call back number: [phone]    Patient is needing: PATIENT IS RETURNING CALL

## 2024-01-25 NOTE — TELEPHONE ENCOUNTER
Left detailed message to call the office back regarding pain and blood work. Patient was seen in office on 1/22/24 for muscle strain will patient was advised to try robaxin and cont naproxen. recommend PT at home at this time.ice/heat as needed. In patients chart there was lab work done previously by another provider.

## 2024-02-05 ENCOUNTER — OFFICE VISIT (OUTPATIENT)
Dept: INTERNAL MEDICINE | Facility: CLINIC | Age: 39
End: 2024-02-05
Payer: COMMERCIAL

## 2024-02-05 VITALS
TEMPERATURE: 97.8 F | HEIGHT: 63 IN | WEIGHT: 172 LBS | BODY MASS INDEX: 30.48 KG/M2 | DIASTOLIC BLOOD PRESSURE: 80 MMHG | OXYGEN SATURATION: 98 % | HEART RATE: 97 BPM | SYSTOLIC BLOOD PRESSURE: 114 MMHG

## 2024-02-05 DIAGNOSIS — J30.9 ALLERGIC RHINITIS, UNSPECIFIED SEASONALITY, UNSPECIFIED TRIGGER: ICD-10-CM

## 2024-02-05 DIAGNOSIS — M79.10 MYALGIA: Primary | ICD-10-CM

## 2024-02-05 DIAGNOSIS — G43.909 MIGRAINE WITHOUT STATUS MIGRAINOSUS, NOT INTRACTABLE, UNSPECIFIED MIGRAINE TYPE: ICD-10-CM

## 2024-02-05 LAB
ALBUMIN SERPL-MCNC: 4.6 G/DL (ref 3.5–5.2)
ALBUMIN/GLOB SERPL: 1.2 G/DL
ALP SERPL-CCNC: 58 U/L (ref 39–117)
ALT SERPL W P-5'-P-CCNC: 16 U/L (ref 1–33)
ANION GAP SERPL CALCULATED.3IONS-SCNC: 13.5 MMOL/L (ref 5–15)
AST SERPL-CCNC: 17 U/L (ref 1–32)
BILIRUB SERPL-MCNC: 0.2 MG/DL (ref 0–1.2)
BUN SERPL-MCNC: 9 MG/DL (ref 6–20)
BUN/CREAT SERPL: 25.7 (ref 7–25)
CALCIUM SPEC-SCNC: 9.7 MG/DL (ref 8.6–10.5)
CHLORIDE SERPL-SCNC: 103 MMOL/L (ref 98–107)
CK SERPL-CCNC: 69 U/L (ref 20–180)
CO2 SERPL-SCNC: 23.5 MMOL/L (ref 22–29)
CREAT SERPL-MCNC: 0.35 MG/DL (ref 0.57–1)
CRP SERPL-MCNC: 0.74 MG/DL (ref 0–0.5)
EGFRCR SERPLBLD CKD-EPI 2021: 135.2 ML/MIN/1.73
ERYTHROCYTE [SEDIMENTATION RATE] IN BLOOD: 5 MM/HR (ref 0–20)
GLOBULIN UR ELPH-MCNC: 3.9 GM/DL
GLUCOSE SERPL-MCNC: 82 MG/DL (ref 65–99)
IRON 24H UR-MRATE: 121 MCG/DL (ref 37–145)
IRON SATN MFR SERPL: 21 % (ref 20–50)
MAGNESIUM SERPL-MCNC: 2.2 MG/DL (ref 1.6–2.6)
POTASSIUM SERPL-SCNC: 4.7 MMOL/L (ref 3.5–5.2)
PROT SERPL-MCNC: 8.5 G/DL (ref 6–8.5)
SODIUM SERPL-SCNC: 140 MMOL/L (ref 136–145)
TIBC SERPL-MCNC: 578 MCG/DL (ref 298–536)
TRANSFERRIN SERPL-MCNC: 388 MG/DL (ref 200–360)
VIT B12 BLD-MCNC: 452 PG/ML (ref 211–946)

## 2024-02-05 PROCEDURE — 85652 RBC SED RATE AUTOMATED: CPT | Performed by: NURSE PRACTITIONER

## 2024-02-05 PROCEDURE — 82550 ASSAY OF CK (CPK): CPT | Performed by: NURSE PRACTITIONER

## 2024-02-05 PROCEDURE — 80053 COMPREHEN METABOLIC PANEL: CPT | Performed by: NURSE PRACTITIONER

## 2024-02-05 PROCEDURE — 84466 ASSAY OF TRANSFERRIN: CPT | Performed by: NURSE PRACTITIONER

## 2024-02-05 PROCEDURE — 82607 VITAMIN B-12: CPT | Performed by: NURSE PRACTITIONER

## 2024-02-05 PROCEDURE — 83735 ASSAY OF MAGNESIUM: CPT | Performed by: NURSE PRACTITIONER

## 2024-02-05 PROCEDURE — 99214 OFFICE O/P EST MOD 30 MIN: CPT | Performed by: NURSE PRACTITIONER

## 2024-02-05 PROCEDURE — 86140 C-REACTIVE PROTEIN: CPT | Performed by: NURSE PRACTITIONER

## 2024-02-05 PROCEDURE — 83540 ASSAY OF IRON: CPT | Performed by: NURSE PRACTITIONER

## 2024-02-05 RX ORDER — RIMEGEPANT SULFATE 75 MG/75MG
75 TABLET, ORALLY DISINTEGRATING ORAL
Qty: 16 TABLET | Refills: 2 | Status: SHIPPED | OUTPATIENT
Start: 2024-02-05

## 2024-02-05 NOTE — PROGRESS NOTES
"Chief Complaint  Generalized Body Aches (Last visit she had pain in her upper body area //Now having pain in her thighs and calfs and also in her feet would start at like 2-3 on a scale and at night it would be 6-7 //Would told to come back if the pain would move else where to get labs done ) and Migraine    Subjective          Minh Drake presents to [facility]  History of Present Illness  She reports having headache, nausea in the last 2-3 days. Hx of migraines and feels like a typical migraine  Tried flonase and zyrtec due to concern for sinus symptoms but not noticing any improvement.  Headache primarily on the left side  Some runny nose which she thinks is just allergies.    She reports having body aches ongoing for several weeks. She reports soreness in thighs, calves, and feet. She has tried taking robaxin which has helps a little. She is also taking naproxen often.  She reports that pain is occurring at times when she has not recently exercised.  She reports that pain will occur 2 to 3 days after exercising   Can be severe even if workout with light.  Objective   Vital Signs:   /80 (BP Location: Right arm, Patient Position: Sitting, Cuff Size: Adult)   Pulse 97   Temp 97.8 °F (36.6 °C) (Temporal)   Ht 160 cm (63\")   Wt 78 kg (172 lb)   SpO2 98%   BMI 30.47 kg/m²     Physical Exam  Vitals and nursing note reviewed.   Constitutional:       General: She is not in acute distress.     Appearance: Normal appearance.   HENT:      Head: Normocephalic and atraumatic.      Right Ear: External ear normal.      Left Ear: External ear normal.      Nose: Nose normal.      Mouth/Throat:      Mouth: Mucous membranes are moist.   Eyes:      Conjunctiva/sclera: Conjunctivae normal.   Cardiovascular:      Rate and Rhythm: Normal rate and regular rhythm.      Pulses: Normal pulses.      Heart sounds: Normal heart sounds. No murmur heard.     No friction rub. No " "gallop.   Pulmonary:      Effort: Pulmonary effort is normal. No respiratory distress.      Breath sounds: No wheezing, rhonchi or rales.   Musculoskeletal:      Cervical back: Neck supple.      Right lower leg: No edema.      Left lower leg: No edema.   Skin:     General: Skin is warm and dry.   Neurological:      General: No focal deficit present.      Mental Status: She is alert and oriented to person, place, and time.      Cranial Nerves: No cranial nerve deficit.      Motor: No weakness.   Psychiatric:         Mood and Affect: Mood normal.         Behavior: Behavior normal.        Result Review :          Procedures      Assessment and Plan    Diagnoses and all orders for this visit:    1. Myalgia (Primary)  Comments:  Will pursue further workup with labs at this time.  Discussed rest, hydration  Orders:  -     CK  -     Comprehensive Metabolic Panel  -     Magnesium  -     Iron Profile  -     Sedimentation Rate  -     C-reactive Protein  -     Vitamin B12    2. Migraine without status migrainosus, not intractable, unspecified migraine type    3. Allergic rhinitis, unspecified seasonality, unspecified trigger  Comments:  She will continue with Zyrtec at this time    Other orders  -     Rimegepant Sulfate (Nurtec) 75 MG tablet dispersible tablet; Take 1 tablet by mouth Every Other Day As Needed (headache).  Dispense: 16 tablet; Refill: 2    Discussed ddx for headaches.  Discussed risk for rebound headache with frequent use of NSAID/caffeine.  Encouraged patient to keep a headache diary.  Patient will go to the ER with \" worst headache of life\", vision loss, unilateral weakness, slurred speech, confusion, or syncope.  Will discuss further work-up such as MRI if no improvement with conservative treatment.  Sending Nurtec in for her to try, out of samples in the office          Follow Up   Return in about 2 weeks (around 2/19/2024).  Patient was given instructions and counseling regarding her condition or for " health maintenance advice. Please see specific information pulled into the AVS if appropriate.

## 2024-02-19 ENCOUNTER — OFFICE VISIT (OUTPATIENT)
Dept: INTERNAL MEDICINE | Facility: CLINIC | Age: 39
End: 2024-02-19
Payer: COMMERCIAL

## 2024-02-19 VITALS
WEIGHT: 172.8 LBS | HEART RATE: 89 BPM | SYSTOLIC BLOOD PRESSURE: 128 MMHG | DIASTOLIC BLOOD PRESSURE: 82 MMHG | OXYGEN SATURATION: 98 % | TEMPERATURE: 98.4 F | HEIGHT: 63 IN | BODY MASS INDEX: 30.62 KG/M2

## 2024-02-19 DIAGNOSIS — G43.909 MIGRAINE WITHOUT STATUS MIGRAINOSUS, NOT INTRACTABLE, UNSPECIFIED MIGRAINE TYPE: ICD-10-CM

## 2024-02-19 DIAGNOSIS — R09.81 NASAL CONGESTION: Primary | ICD-10-CM

## 2024-02-19 DIAGNOSIS — M79.10 MYALGIA: ICD-10-CM

## 2024-02-19 DIAGNOSIS — J06.9 ACUTE URI: ICD-10-CM

## 2024-02-19 DIAGNOSIS — J30.9 ALLERGIC RHINITIS, UNSPECIFIED SEASONALITY, UNSPECIFIED TRIGGER: ICD-10-CM

## 2024-02-19 LAB
EXPIRATION DATE: NORMAL
EXPIRATION DATE: NORMAL
FLUAV AG UPPER RESP QL IA.RAPID: NOT DETECTED
FLUBV AG UPPER RESP QL IA.RAPID: NOT DETECTED
INTERNAL CONTROL: NORMAL
INTERNAL CONTROL: NORMAL
Lab: 8725
Lab: 9151
S PYO AG THROAT QL: NEGATIVE
SARS-COV-2 AG UPPER RESP QL IA.RAPID: NOT DETECTED

## 2024-02-19 PROCEDURE — 99214 OFFICE O/P EST MOD 30 MIN: CPT | Performed by: NURSE PRACTITIONER

## 2024-02-19 PROCEDURE — 87880 STREP A ASSAY W/OPTIC: CPT | Performed by: NURSE PRACTITIONER

## 2024-02-19 PROCEDURE — 87428 SARSCOV & INF VIR A&B AG IA: CPT | Performed by: NURSE PRACTITIONER

## 2024-02-19 RX ORDER — TRIAMCINOLONE ACETONIDE 55 UG/1
2 SPRAY, METERED NASAL DAILY
Qty: 16.5 G | Refills: 2 | Status: SHIPPED | OUTPATIENT
Start: 2024-02-19

## 2024-02-19 RX ORDER — RIZATRIPTAN BENZOATE 5 MG/1
5 TABLET, ORALLY DISINTEGRATING ORAL AS NEEDED
Qty: 12 TABLET | Refills: 1 | Status: SHIPPED | OUTPATIENT
Start: 2024-02-19

## 2024-02-19 NOTE — PROGRESS NOTES
"Chief Complaint  myalgia (F/u), Nasal Congestion (Been exposed to covid), and Headache (Mild head ache)    Subjective          Minh Drake presents to [facility]  History of Present Illness  She reports that she started feeling bad in the last few days. She reports headache and nasal congestion in the last few days. She reports feeling like she had a cold 2 wks ago. Was feeling better prior to onset of new symptoms. He  currently has covid. Her son currently has strep.    Myalgia  Has been taking robaxin  Has also started b12 in the last week    Migraine-insurance did not cover migraine medication  She is still having headaches often  May be having worsening with flonase use  Objective   Vital Signs:   /82 (BP Location: Right arm, Patient Position: Sitting, Cuff Size: Adult)   Pulse 89   Temp 98.4 °F (36.9 °C) (Temporal)   Ht 160 cm (62.99\")   Wt 78.4 kg (172 lb 12.8 oz)   SpO2 98%   BMI 30.62 kg/m²     Physical Exam  Vitals and nursing note reviewed.   Constitutional:       General: She is not in acute distress.     Appearance: Normal appearance.   HENT:      Head: Normocephalic and atraumatic.      Right Ear: Tympanic membrane, ear canal and external ear normal.      Left Ear: Tympanic membrane, ear canal and external ear normal.      Nose: Nose normal.      Mouth/Throat:      Mouth: Mucous membranes are moist.   Eyes:      Conjunctiva/sclera: Conjunctivae normal.   Cardiovascular:      Rate and Rhythm: Normal rate and regular rhythm.      Pulses: Normal pulses.      Heart sounds: Normal heart sounds. No murmur heard.     No friction rub. No gallop.   Pulmonary:      Effort: Pulmonary effort is normal. No respiratory distress.      Breath sounds: No wheezing, rhonchi or rales.   Musculoskeletal:      Cervical back: Neck supple.      Right lower leg: No edema.      Left lower leg: No edema.   Lymphadenopathy:      Cervical: No cervical " adenopathy.   Skin:     General: Skin is warm and dry.   Neurological:      General: No focal deficit present.      Mental Status: She is alert and oriented to person, place, and time.   Psychiatric:         Mood and Affect: Mood normal.         Behavior: Behavior normal.        Result Review :          Procedures      Assessment and Plan    Diagnoses and all orders for this visit:    1. Nasal congestion (Primary)  -     POCT SARS-CoV-2 Antigen SERGE + Flu  -     POCT rapid strep A    2. Allergic rhinitis, unspecified seasonality, unspecified trigger  Comments:  will stop flonase and switch to nasacort    3. Acute URI    4. Migraine without status migrainosus, not intractable, unspecified migraine type  Comments:  will try maxalt. r/b discussed. nurtec not covered at this time    5. Myalgia  Comments:  improved but difficult to say given recent serial viral illnesses. will reassess in 4 wks    Other orders  -     rizatriptan MLT (MAXALT-MLT) 5 MG disintegrating tablet; Place 1 tablet on the tongue As Needed for Migraine. May repeat in 2 hours if needed  Dispense: 12 tablet; Refill: 1  -     Triamcinolone Acetonide (Nasacort Allergy 24HR) 55 MCG/ACT nasal inhaler; 2 sprays into the nostril(s) as directed by provider Daily.  Dispense: 16.5 g; Refill: 2  -     Coenzyme Q10 (Co Q 10) 100 MG capsule; Take 1 capsule by mouth Daily.  Dispense: 90 capsule; Refill: 0    Likely viral URI. Discussed negative covid, flu and strep tests. Discussed continuing to monitor for new or worsening sx. Discussed expected improvement and continued supportive care.          Follow Up   Return in about 4 weeks (around 3/18/2024).  Patient was given instructions and counseling regarding her condition or for health maintenance advice. Please see specific information pulled into the AVS if appropriate.

## 2024-03-18 ENCOUNTER — OFFICE VISIT (OUTPATIENT)
Dept: INTERNAL MEDICINE | Facility: CLINIC | Age: 39
End: 2024-03-18
Payer: COMMERCIAL

## 2024-03-18 VITALS
DIASTOLIC BLOOD PRESSURE: 64 MMHG | HEIGHT: 63 IN | BODY MASS INDEX: 30.94 KG/M2 | HEART RATE: 88 BPM | SYSTOLIC BLOOD PRESSURE: 112 MMHG | RESPIRATION RATE: 18 BRPM | WEIGHT: 174.6 LBS | OXYGEN SATURATION: 98 % | TEMPERATURE: 97.9 F

## 2024-03-18 DIAGNOSIS — M79.10 MYALGIA: Primary | ICD-10-CM

## 2024-03-18 DIAGNOSIS — E55.9 VITAMIN D DEFICIENCY: ICD-10-CM

## 2024-03-18 DIAGNOSIS — R51.9 NONINTRACTABLE HEADACHE, UNSPECIFIED CHRONICITY PATTERN, UNSPECIFIED HEADACHE TYPE: ICD-10-CM

## 2024-03-18 DIAGNOSIS — R76.8 POSITIVE ANA (ANTINUCLEAR ANTIBODY): ICD-10-CM

## 2024-03-18 DIAGNOSIS — J45.909 ASTHMA, UNSPECIFIED ASTHMA SEVERITY, UNSPECIFIED WHETHER COMPLICATED, UNSPECIFIED WHETHER PERSISTENT: Chronic | ICD-10-CM

## 2024-03-18 LAB
25(OH)D3 SERPL-MCNC: 30.6 NG/ML (ref 30–100)
CHROMATIN AB SERPL-ACNC: <10 IU/ML (ref 0–14)
CRP SERPL-MCNC: 0.75 MG/DL (ref 0–0.5)
ERYTHROCYTE [SEDIMENTATION RATE] IN BLOOD: 27 MM/HR (ref 0–20)

## 2024-03-18 PROCEDURE — 82306 VITAMIN D 25 HYDROXY: CPT | Performed by: NURSE PRACTITIONER

## 2024-03-18 PROCEDURE — 86200 CCP ANTIBODY: CPT | Performed by: NURSE PRACTITIONER

## 2024-03-18 PROCEDURE — 86225 DNA ANTIBODY NATIVE: CPT | Performed by: NURSE PRACTITIONER

## 2024-03-18 PROCEDURE — 86140 C-REACTIVE PROTEIN: CPT | Performed by: NURSE PRACTITIONER

## 2024-03-18 PROCEDURE — 85652 RBC SED RATE AUTOMATED: CPT | Performed by: NURSE PRACTITIONER

## 2024-03-18 PROCEDURE — 86038 ANTINUCLEAR ANTIBODIES: CPT | Performed by: NURSE PRACTITIONER

## 2024-03-18 PROCEDURE — 86431 RHEUMATOID FACTOR QUANT: CPT | Performed by: NURSE PRACTITIONER

## 2024-03-18 RX ORDER — ALBUTEROL SULFATE 90 UG/1
2 AEROSOL, METERED RESPIRATORY (INHALATION) EVERY 4 HOURS PRN
Qty: 6.7 G | Refills: 0 | Status: SHIPPED | OUTPATIENT
Start: 2024-03-18

## 2024-03-18 RX ORDER — HYDROXYZINE HYDROCHLORIDE 25 MG/1
25 TABLET, FILM COATED ORAL
Qty: 90 TABLET | Refills: 1 | Status: SHIPPED | OUTPATIENT
Start: 2024-03-18

## 2024-03-18 RX ORDER — ERGOCALCIFEROL 1.25 MG/1
50000 CAPSULE ORAL WEEKLY
Qty: 13 CAPSULE | Refills: 1 | Status: SHIPPED | OUTPATIENT
Start: 2024-03-18

## 2024-03-18 NOTE — PROGRESS NOTES
"Chief Complaint  Myalgia (4 week follow up ) and Migraine    Subjective          Minh Drake presents to [facility]  History of Present Illness  Myalgia-She reports that she has noticed some improvement. She is taking muscle  relaxer which helps. She is doing PT stretches at home with helps.   Denies fever, chills, weight loss, rash    Migraines-nurtec not covered  She has not been taking maxalt due to having less headaches recently.  She reports trying it once but unknown improvement    Asthma-mild, intermittent-she reports having some soa with exposure to 2nd hand smoke recently  No recent use of albuterol  Objective   Vital Signs:   /64 (BP Location: Left arm, Patient Position: Sitting, Cuff Size: Adult)   Pulse 88   Temp 97.9 °F (36.6 °C)   Resp 18   Ht 160 cm (62.99\")   Wt 79.2 kg (174 lb 9.6 oz)   SpO2 98%   BMI 30.94 kg/m²     Physical Exam  Vitals and nursing note reviewed.   Constitutional:       General: She is not in acute distress.     Appearance: Normal appearance.   HENT:      Head: Normocephalic and atraumatic.      Right Ear: External ear normal.      Left Ear: External ear normal.      Nose: Nose normal.      Mouth/Throat:      Mouth: Mucous membranes are moist.   Eyes:      Conjunctiva/sclera: Conjunctivae normal.   Cardiovascular:      Rate and Rhythm: Normal rate and regular rhythm.      Pulses: Normal pulses.      Heart sounds: Normal heart sounds. No murmur heard.     No friction rub. No gallop.   Pulmonary:      Effort: Pulmonary effort is normal. No respiratory distress.      Breath sounds: No wheezing, rhonchi or rales.   Musculoskeletal:      Cervical back: Neck supple.      Right lower leg: No edema.      Left lower leg: No edema.   Skin:     General: Skin is warm and dry.   Neurological:      General: No focal deficit present.      Mental Status: She is alert and oriented to person, place, and time.   Psychiatric:         " Mood and Affect: Mood normal.         Behavior: Behavior normal.        Result Review :          Procedures      Assessment and Plan    Diagnoses and all orders for this visit:    1. Myalgia (Primary)  Comments:  labs reviewed. additional labs today for autoimmune work up. she will cont with isolated PT stretches  Orders:  -     STEFANI  -     Cyclic Citrul Peptide Antibody, IgG / IgA  -     C-reactive Protein  -     Rheumatoid Factor  -     Sedimentation Rate    2. Vitamin D deficiency  Comments:  rechecking today  Orders:  -     Vitamin D,25-Hydroxy    3. Asthma, unspecified asthma severity, unspecified whether complicated, unspecified whether persistent  Comments:  previously well controlled. refilling albuterol. discussed appropriate use    4. Nonintractable headache, unspecified chronicity pattern, unspecified headache type  Comments:  currently improved. discussed maxalt use with recurrence/worsening    Other orders  -     hydrOXYzine (ATARAX) 25 MG tablet; Take 1 tablet by mouth every night at bedtime.  Dispense: 90 tablet; Refill: 1  -     vitamin D (ERGOCALCIFEROL) 1.25 MG (58751 UT) capsule capsule; Take 1 capsule by mouth 1 (One) Time Per Week.  Dispense: 13 capsule; Refill: 1  -     albuterol sulfate  (90 Base) MCG/ACT inhaler; Inhale 2 puffs Every 4 (Four) Hours As Needed for Wheezing.  Dispense: 6.7 g; Refill: 0              Follow Up   Return in about 6 weeks (around 4/29/2024).  Patient was given instructions and counseling regarding her condition or for health maintenance advice. Please see specific information pulled into the AVS if appropriate.

## 2024-03-19 LAB
ANA SER QL: POSITIVE
CCP IGA+IGG SERPL IA-ACNC: 0 UNITS (ref 0–19)
DSDNA AB SER-ACNC: 1 IU/ML (ref 0–9)
Lab: NORMAL

## 2024-04-08 DIAGNOSIS — Z30.44 ENCOUNTER FOR SURVEILLANCE OF VAGINAL RING HORMONAL CONTRACEPTIVE DEVICE: Primary | ICD-10-CM

## 2024-04-08 RX ORDER — ETONOGESTREL/ETHINYL ESTRADIOL .12-.015MG
1 RING, VAGINAL VAGINAL
Qty: 3 EACH | Refills: 3 | Status: SHIPPED | OUTPATIENT
Start: 2024-04-08 | End: 2025-04-08

## 2024-04-11 ENCOUNTER — OFFICE VISIT (OUTPATIENT)
Dept: INTERNAL MEDICINE | Facility: CLINIC | Age: 39
End: 2024-04-11
Payer: COMMERCIAL

## 2024-04-11 VITALS
TEMPERATURE: 96.6 F | WEIGHT: 174.2 LBS | DIASTOLIC BLOOD PRESSURE: 76 MMHG | SYSTOLIC BLOOD PRESSURE: 130 MMHG | OXYGEN SATURATION: 97 % | BODY MASS INDEX: 30.87 KG/M2 | HEART RATE: 103 BPM

## 2024-04-11 DIAGNOSIS — R51.9 NONINTRACTABLE EPISODIC HEADACHE, UNSPECIFIED HEADACHE TYPE: ICD-10-CM

## 2024-04-11 DIAGNOSIS — M79.10 GENERALIZED MUSCLE ACHE: ICD-10-CM

## 2024-04-11 DIAGNOSIS — J02.9 SORE THROAT: ICD-10-CM

## 2024-04-11 DIAGNOSIS — H92.01 RIGHT EAR PAIN: Primary | ICD-10-CM

## 2024-04-11 LAB
EXPIRATION DATE: NORMAL
EXPIRATION DATE: NORMAL
FLUAV AG UPPER RESP QL IA.RAPID: NOT DETECTED
FLUBV AG UPPER RESP QL IA.RAPID: NOT DETECTED
INTERNAL CONTROL: NORMAL
INTERNAL CONTROL: NORMAL
Lab: 9737
Lab: NORMAL
S PYO AG THROAT QL: NEGATIVE
SARS-COV-2 AG UPPER RESP QL IA.RAPID: NOT DETECTED

## 2024-04-11 PROCEDURE — 90480 ADMN SARSCOV2 VAC 1/ONLY CMP: CPT | Performed by: STUDENT IN AN ORGANIZED HEALTH CARE EDUCATION/TRAINING PROGRAM

## 2024-04-11 PROCEDURE — 87428 SARSCOV & INF VIR A&B AG IA: CPT | Performed by: STUDENT IN AN ORGANIZED HEALTH CARE EDUCATION/TRAINING PROGRAM

## 2024-04-11 PROCEDURE — 91320 SARSCV2 VAC 30MCG TRS-SUC IM: CPT | Performed by: STUDENT IN AN ORGANIZED HEALTH CARE EDUCATION/TRAINING PROGRAM

## 2024-04-11 PROCEDURE — 87880 STREP A ASSAY W/OPTIC: CPT | Performed by: STUDENT IN AN ORGANIZED HEALTH CARE EDUCATION/TRAINING PROGRAM

## 2024-04-11 PROCEDURE — 99214 OFFICE O/P EST MOD 30 MIN: CPT | Performed by: STUDENT IN AN ORGANIZED HEALTH CARE EDUCATION/TRAINING PROGRAM

## 2024-04-11 RX ORDER — METHOCARBAMOL 500 MG/1
500 TABLET, FILM COATED ORAL 3 TIMES DAILY PRN
Qty: 60 TABLET | Refills: 0 | Status: SHIPPED | OUTPATIENT
Start: 2024-04-11

## 2024-04-11 RX ORDER — AMOXICILLIN 875 MG/1
875 TABLET, COATED ORAL 2 TIMES DAILY
Qty: 14 TABLET | Refills: 0 | Status: SHIPPED | OUTPATIENT
Start: 2024-04-11

## 2024-04-11 NOTE — PROGRESS NOTES
"Chief Complaint  Earache (Right ear, having headache on right side as well. Ear pain started last night, hard to swallow, due to pain. ) and Diarrhea (Started 4/10)    Subjective            Minh Drake presents to [facility]  History of Present Illness  Right ear symptoms:  Started experiencing sharp pain from the back of her throat to the base of the right ear last year.  Worse when she swallows.   No fevers.   She does not feel congested.   States she has been slacking on her nasal spray.   Endorses diarrhea since yesterday, about 3 episodes since yesterday, and 1 today, described as stools being \"too soft\".   Since this AM shei s also having \"spikeing headache, sharp\". Pain comes an goes.   She is also endorsing generalized muscle pain, \"not the joints\", and no swelling. Generalized muscle pain has been ongoing for many months. States even normal exercise/activity level would make it less.       Past Medical History:   Diagnosis Date    Allergic rhinitis     Asthma     4th grade    Broken bones     Elementary    Migraine headache     Sinus trouble        Allergies:   Allergies   Allergen Reactions    Brompheniramine-Pseudoeph Rash    Cephalexin Rash          Past Surgical History:   Procedure Laterality Date    COLONOSCOPY N/A 12/12/2023    Procedure: COLONOSCOPY WITH BIOPSIES;  Surgeon: Tee Melo MD;  Location: [facility] ENDOSCOPY;  Service: Gastroenterology;  Laterality: N/A;  NORMAL    WISDOM TOOTH EXTRACTION            Social History     Socioeconomic History    Marital status:     Number of children: 2   Tobacco Use    Smoking status: Never    Smokeless tobacco: Never   Vaping Use    Vaping status: Never Used   Substance and Sexual Activity    Alcohol use: Never    Drug use: Never    Sexual activity: Yes     Partners: Male     Birth control/protection: Vaginal contraceptive ring         Family History   Problem Relation Age of Onset    " Prostate cancer Father     Other Mother         Cerebrovascular Accident    Breast cancer Sister 44    Colon cancer Neg Hx     Ovarian cancer Neg Hx     Uterine cancer Neg Hx           Health Maintenance Due   Topic Date Due    Pneumococcal Vaccine 0-64 (1 of 2 - PCV) Never done    BMI FOLLOWUP  03/02/2024            Current Outpatient Medications:     albuterol sulfate  (90 Base) MCG/ACT inhaler, Inhale 2 puffs Every 4 (Four) Hours As Needed for Wheezing., Disp: 6.7 g, Rfl: 0    cetirizine (zyrTEC) 10 MG tablet, Take 1 tablet by mouth Daily., Disp: 90 tablet, Rfl: 1    hydrOXYzine (ATARAX) 25 MG tablet, Take 1 tablet by mouth every night at bedtime., Disp: 90 tablet, Rfl: 1    methocarbamol (ROBAXIN) 500 MG tablet, Take 1 tablet by mouth 3 (Three) Times a Day As Needed for Muscle Spasms., Disp: 60 tablet, Rfl: 0    naproxen sodium (ANAPROX) 550 MG tablet, Take 1 tablet by mouth 2 (Two) Times a Day With Meals., Disp: , Rfl:     NuvaRing 0.12-0.015 MG/24HR vaginal ring, Insert 1 each into the vagina Every 28 (Twenty-Eight) Days. Insert vaginally and leave in place for 3 consecutive weeks, then remove for 1 week., Disp: 3 each, Rfl: 3    traZODone (DESYREL) 50 MG tablet, Take 1 tablet by mouth Every Night., Disp: 90 tablet, Rfl: 1    vitamin D (ERGOCALCIFEROL) 1.25 MG (01841 UT) capsule capsule, Take 1 capsule by mouth 1 (One) Time Per Week., Disp: 13 capsule, Rfl: 1    amoxicillin (AMOXIL) 875 MG tablet, Take 1 tablet by mouth 2 (Two) Times a Day., Disp: 14 tablet, Rfl: 0    Coenzyme Q10 (Co Q 10) 100 MG capsule, Take 1 capsule by mouth Daily. (Patient not taking: Reported on 4/11/2024), Disp: 90 capsule, Rfl: 0    triamcinolone (KENALOG) 0.1 % cream, , Disp: , Rfl:     Triamcinolone Acetonide (Nasacort Allergy 24HR) 55 MCG/ACT nasal inhaler, 2 sprays into the nostril(s) as directed by provider Daily. (Patient not taking: Reported on 4/11/2024), Disp: 16.5 g, Rfl: 2    VITAMIN E 400 UNIT capsule, Take 1  capsule by mouth 2 (Two) Times a Day. (Patient not taking: Reported on 4/11/2024), Disp: , Rfl:       Immunization History   Administered Date(s) Administered    COVID-19 (PFIZER) BIVALENT 12+YRS 01/03/2023    COVID-19 (PFIZER) Purple Cap Monovalent 04/15/2021, 05/06/2021, 01/10/2022    COVID-19 F23 (PFIZER) 12YRS+ (COMIRNATY) 04/11/2024    Flu Vaccine Quad PF >36MO 10/05/2020    Fluzone (or Fluarix & Flulaval for VFC) >6mos 10/05/2020, 09/16/2022, 09/16/2023    Influenza Quad Vaccine (Inpatient) 09/23/2016    Influenza TIV (IM) 10/08/2021    Influenza, Unspecified 10/08/2021    Tdap 02/05/2016         Review of Systems       Objective       Vitals:    04/11/24 0931   BP: 130/76   BP Location: Right arm   Patient Position: Sitting   Cuff Size: Adult   Pulse: 103   Temp: 96.6 °F (35.9 °C)   TempSrc: Temporal   SpO2: 97%   Weight: 79 kg (174 lb 3.2 oz)     Body mass index is 30.87 kg/m².      Physical Exam  Vitals reviewed.   Constitutional:       Appearance: Normal appearance.   HENT:      Head: Normocephalic and atraumatic.      Right Ear: Tympanic membrane, ear canal and external ear normal. A middle ear effusion is present. There is no impacted cerumen. Tympanic membrane is injected, erythematous and bulging (mild).      Left Ear: Tympanic membrane, ear canal and external ear normal. There is no impacted cerumen. Tympanic membrane is not injected, erythematous or bulging.      Nose: Nose normal.   Eyes:      Extraocular Movements: Extraocular movements intact.      Conjunctiva/sclera: Conjunctivae normal.   Cardiovascular:      Rate and Rhythm: Normal rate and regular rhythm.      Pulses: Normal pulses.      Heart sounds: Normal heart sounds.   Pulmonary:      Effort: Pulmonary effort is normal. No respiratory distress.      Breath sounds: Normal breath sounds.   Musculoskeletal:         General: Normal range of motion.   Skin:     General: Skin is warm and dry.   Neurological:      General: No focal deficit  present.      Mental Status: She is alert and oriented to person, place, and time.      Cranial Nerves: No cranial nerve deficit.   Psychiatric:         Mood and Affect: Mood normal.         Behavior: Behavior normal.         Thought Content: Thought content normal.             Result Review :                           Assessment and Plan      Diagnoses and all orders for this visit:    1. Right ear pain (Primary)  -     Cancel: COVID-19 and FLU A/B PCR, 1 HR TAT - Swab, Nasopharynx  -     amoxicillin (AMOXIL) 875 MG tablet; Take 1 tablet by mouth 2 (Two) Times a Day.  Dispense: 14 tablet; Refill: 0    2. Sore throat  -     POC Rapid Strep A  -     POCT SARS-CoV-2 + Flu Antigen SERGE    3. Generalized muscle ache  -     methocarbamol (ROBAXIN) 500 MG tablet; Take 1 tablet by mouth 3 (Three) Times a Day As Needed for Muscle Spasms.  Dispense: 60 tablet; Refill: 0  -     Cancel: COVID-19 and FLU A/B PCR, 1 HR TAT - Swab, Nasopharynx    4. Nonintractable episodic headache, unspecified headache type  -     POCT SARS-CoV-2 + Flu Antigen SERGE    Other orders  -     COVID-19 F23 (Pfizer) 12yrs+ (COMIRNATY)          Orders Placed This Encounter   Procedures    COVID-19 F23 (Pfizer) 12yrs+ (COMIRNATY)    POC Rapid Strep A    POCT SARS-CoV-2 + Flu Antigen SERGE     New Medications Ordered This Visit   Medications    methocarbamol (ROBAXIN) 500 MG tablet     Sig: Take 1 tablet by mouth 3 (Three) Times a Day As Needed for Muscle Spasms.     Dispense:  60 tablet     Refill:  0    amoxicillin (AMOXIL) 875 MG tablet     Sig: Take 1 tablet by mouth 2 (Two) Times a Day.     Dispense:  14 tablet     Refill:  0       Pt presenting w/ constellation of symptoms. POCT returned negative. Found to have rt early AOM on exam. Amoxicillin sent in. Worrisome s/s warranting rtc discussed.   Refilled meds.         Follow Up     No follow-ups on file.    Patient was given instructions and counseling regarding her condition or for health maintenance  advice. Please see specific information pulled into the AVS if appropriate.     Dottie Rodriguez MD   Internal Medicine-Pediatrics

## 2024-05-01 ENCOUNTER — TELEPHONE (OUTPATIENT)
Dept: INTERNAL MEDICINE | Facility: CLINIC | Age: 39
End: 2024-05-01

## 2024-05-01 ENCOUNTER — HOSPITAL ENCOUNTER (OUTPATIENT)
Dept: ULTRASOUND IMAGING | Facility: HOSPITAL | Age: 39
Discharge: HOME | End: 2024-05-01
Admitting: NURSE PRACTITIONER
Payer: COMMERCIAL

## 2024-05-01 DIAGNOSIS — R79.89 ELEVATED LFTS: ICD-10-CM

## 2024-05-01 DIAGNOSIS — K76.0 FATTY LIVER: ICD-10-CM

## 2024-05-01 PROCEDURE — 76705 ECHO EXAM OF ABDOMEN: CPT

## 2024-05-01 NOTE — TELEPHONE ENCOUNTER
Caller: Minh Drake    Relationship to patient: Self    Best call back number: [phone]     Chief complaint: PATIENT NEEDING TO FOLLOW UP WITH NATHAN CAICEDO AFTER SPECIALIST APPOINTMENT ON 05.03.2024    Type of visit: OFFICE VISIT    Requested date: AFTER 05.03.2024 BUT BEFORE 05.10.2024     If rescheduling, when is the original appointment: 05.10.2024     Additional notes:PATIENT WAS ADVISED DURING LAST VISIT WITH DR. CARBAJAL ON 04.11.2024 TO FOLLOW UP AFTER SPECIALIST APPOINTMENT WITH NATHAN CAICEDO AS SOON AS SHE COULD.

## 2024-05-09 ENCOUNTER — OFFICE VISIT (OUTPATIENT)
Dept: INTERNAL MEDICINE | Facility: CLINIC | Age: 39
End: 2024-05-09
Payer: COMMERCIAL

## 2024-05-09 VITALS
DIASTOLIC BLOOD PRESSURE: 68 MMHG | HEIGHT: 63 IN | RESPIRATION RATE: 18 BRPM | WEIGHT: 176 LBS | BODY MASS INDEX: 31.18 KG/M2 | HEART RATE: 86 BPM | OXYGEN SATURATION: 98 % | SYSTOLIC BLOOD PRESSURE: 112 MMHG | TEMPERATURE: 97 F

## 2024-05-09 DIAGNOSIS — G47.00 INSOMNIA, UNSPECIFIED TYPE: Chronic | ICD-10-CM

## 2024-05-09 DIAGNOSIS — M79.10 GENERALIZED MUSCLE ACHE: ICD-10-CM

## 2024-05-09 DIAGNOSIS — M79.10 MYALGIA: ICD-10-CM

## 2024-05-09 DIAGNOSIS — G43.909 MIGRAINE WITHOUT STATUS MIGRAINOSUS, NOT INTRACTABLE, UNSPECIFIED MIGRAINE TYPE: Primary | ICD-10-CM

## 2024-05-09 PROCEDURE — 99214 OFFICE O/P EST MOD 30 MIN: CPT | Performed by: NURSE PRACTITIONER

## 2024-05-09 RX ORDER — DULOXETIN HYDROCHLORIDE 30 MG/1
30 CAPSULE, DELAYED RELEASE ORAL DAILY
Qty: 90 CAPSULE | Refills: 0 | Status: SHIPPED | OUTPATIENT
Start: 2024-05-09

## 2024-05-09 RX ORDER — PHENOL 1.4 %
1 AEROSOL, SPRAY (ML) MUCOUS MEMBRANE NIGHTLY PRN
COMMUNITY

## 2024-05-09 RX ORDER — METHOCARBAMOL 500 MG/1
500 TABLET, FILM COATED ORAL 3 TIMES DAILY PRN
Qty: 60 TABLET | Refills: 1 | Status: SHIPPED | OUTPATIENT
Start: 2024-05-09

## 2024-05-09 RX ORDER — TRAZODONE HYDROCHLORIDE 50 MG/1
50 TABLET ORAL NIGHTLY
Qty: 90 TABLET | Refills: 1 | Status: SHIPPED | OUTPATIENT
Start: 2024-05-09

## 2024-05-09 RX ORDER — LANOLIN ALCOHOL/MO/W.PET/CERES
1000 CREAM (GRAM) TOPICAL DAILY
COMMUNITY

## 2024-06-28 ENCOUNTER — OFFICE VISIT (OUTPATIENT)
Dept: INTERNAL MEDICINE | Facility: CLINIC | Age: 39
End: 2024-06-28
Payer: COMMERCIAL

## 2024-06-28 VITALS
SYSTOLIC BLOOD PRESSURE: 118 MMHG | WEIGHT: 178 LBS | BODY MASS INDEX: 30.39 KG/M2 | TEMPERATURE: 97.5 F | DIASTOLIC BLOOD PRESSURE: 68 MMHG | RESPIRATION RATE: 18 BRPM | HEART RATE: 81 BPM | OXYGEN SATURATION: 97 % | HEIGHT: 64 IN

## 2024-06-28 DIAGNOSIS — F41.1 GAD (GENERALIZED ANXIETY DISORDER): ICD-10-CM

## 2024-06-28 DIAGNOSIS — F33.0 MAJOR DEPRESSIVE DISORDER, RECURRENT, MILD: ICD-10-CM

## 2024-06-28 DIAGNOSIS — L85.3 DRY SKIN DERMATITIS: ICD-10-CM

## 2024-06-28 DIAGNOSIS — M79.10 MYALGIA: Primary | ICD-10-CM

## 2024-06-28 PROCEDURE — 99214 OFFICE O/P EST MOD 30 MIN: CPT | Performed by: NURSE PRACTITIONER

## 2024-06-28 RX ORDER — TRIAMCINOLONE ACETONIDE 1 MG/G
1 CREAM TOPICAL 2 TIMES DAILY
Qty: 15 G | Refills: 1 | Status: SHIPPED | OUTPATIENT
Start: 2024-06-28

## 2024-06-28 RX ORDER — HYDROXYZINE HYDROCHLORIDE 25 MG/1
25 TABLET, FILM COATED ORAL
Qty: 90 TABLET | Refills: 1 | Status: SHIPPED | OUTPATIENT
Start: 2024-06-28

## 2024-06-28 RX ORDER — METHOCARBAMOL 500 MG/1
500 TABLET, FILM COATED ORAL 3 TIMES DAILY PRN
Qty: 60 TABLET | Refills: 1 | Status: SHIPPED | OUTPATIENT
Start: 2024-06-28

## 2024-06-28 RX ORDER — DULOXETIN HYDROCHLORIDE 60 MG/1
60 CAPSULE, DELAYED RELEASE ORAL DAILY
Qty: 90 CAPSULE | Refills: 0 | Status: SHIPPED | OUTPATIENT
Start: 2024-06-28
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
## 2024-07-17 ENCOUNTER — PATIENT MESSAGE (OUTPATIENT)
Dept: INTERNAL MEDICINE | Facility: CLINIC | Age: 39
End: 2024-07-17
Payer: COMMERCIAL

## 2024-07-24 NOTE — PROGRESS NOTES
Patient Name: Minh [facility]   Visit Date: 07/25/2024   Patient ID: [id number]  Provider: KYUNG Aguilar    Sex: female  Location:  Location Address:  Location Phone: [phone]  [place] [address] [phone]    YOB: 1985  Age: 38 y.o.   Primary Care Provider Jackelyn Helton APRN      Referring Provider: No ref. provider found        Chief Complaint  Fatty Liver (Follow up )    History of Present Illness    Patient initially presented 9/15/2022 with elevated LFTs, reported having alcohol maybe once a month.  Ultrasound of the liver in August showed fatty liver and common bile duct 6.4 mm with gallbladder, no gallstones seen  MRI of the abdomen with and without contrast MRCP 10/14/2022: Significant fatty liver, enlarged to 21 cm, CBD 4 mm, trace bilateral pleural effusions-patient advised to follow-up with primary care for this     Liver work-up negative other than ferritin 842, iron saturation was normal, iron was normal, as a precaution hemochromatosis gene test--  1 gene detected, heterozygous finding usually not associated with increased risk of clinical symptoms     Patient was last seen 12/7/2023, reported rarely having diarrhea but intermittent pain in right lower quadrant and near groin, had follow-up later with GYN same day.  Reported no alcohol since April.  Patient concerned about elevated LFTs, vitamin E 400 twice daily prescribed hyoscyamine prescribed for intermittent diarrhea and right lower quadrant pain    Colonoscopy 12/12/2023: Normal colonoscopy, good prep  Random colon biopsies were negative  Last liver ultrasound 5/1/2024:Hepatic steatosis otherwise negative    Pt states she has been overloaded at work and very stressed, states counseling recommended to her.  She has gained 5#  Pt states RLQ pain resolved, she has only had a few times thinks was r/t menses.   Pt states Vitamin E was expensive so she didn't get   LFTs improved , last check in Feb was  "normal  Past Medical History:   Diagnosis Date    Allergic rhinitis     Asthma     4th grade    Broken bones     Elementary    Migraine headache     Sinus trouble     Strep throat 07/06/2024       Past Surgical History:   Procedure Laterality Date    COLONOSCOPY N/A 12/12/2023    Procedure: COLONOSCOPY WITH BIOPSIES;  Surgeon: Tee Melo MD;  Location: [facility] ENDOSCOPY;  Service: Gastroenterology;  Laterality: N/A;  NORMAL    WISDOM TOOTH EXTRACTION         Allergies   Allergen Reactions    Brompheniramine-Pseudoeph Rash    Cephalexin Rash       Family History   Problem Relation Age of Onset    Prostate cancer Father     Other Mother         Cerebrovascular Accident    Breast cancer Sister 44    Colon cancer Neg Hx     Ovarian cancer Neg Hx     Uterine cancer Neg Hx         Social History     Tobacco Use    Smoking status: Never    Smokeless tobacco: Never   Vaping Use    Vaping status: Never Used   Substance Use Topics    Alcohol use: Never    Drug use: Never       Objective     Vital Signs:   /89 (BP Location: Left arm, Patient Position: Sitting, Cuff Size: Adult)   Pulse 84   Ht 162.6 cm (64\")   Wt 81.6 kg (180 lb)   BMI 30.90 kg/m²       Physical Exam  Constitutional:       General: The patient is not in acute distress.     Appearance: Normal appearance.   HENT:      Head: Normocephalic and atraumatic.      Nose: Nose normal.   Pulmonary:      Effort: Pulmonary effort is normal. No respiratory distress.   Abdominal:      General: Abdomen is flat.      Palpations: Abdomen is soft. There is no mass.      Tenderness: There is no abdominal tenderness. There is no guarding.   Musculoskeletal:      Cervical back: Neck supple.      Right lower leg: No edema.      Left lower leg: No edema.   Skin:     General: Skin is warm and dry.   Neurological:      General: No focal deficit present.      Mental Status: The patient is alert and oriented to person, place, and time.      Gait: Gait normal. "   Psychiatric:         Mood and Affect: Mood normal.         Speech: Speech normal.         Behavior: Behavior normal.         Thought Content: Thought content normal.     Result Review :   The following data was reviewed by: KYUNG Aguilar on 07/25/2024:    CBC w/diff          11/3/2023    08:20 11/21/2023    08:39 12/29/2023    10:16   CBC w/Diff   WBC 9.70  8.94  10.13    RBC 4.61  4.57  4.24    Hemoglobin 14.1  13.8  13.0    Hematocrit 41.2  41.5  38.0    MCV 89.4  90.8  89.6    MCH 30.6  30.2  30.7    MCHC 34.2  33.3  34.2    RDW 11.7  11.9  11.7    Platelets 327  299  285    Neutrophil Rel %  56.7     Immature Granulocyte Rel %  0.2     Lymphocyte Rel %  32.0     Monocyte Rel %  4.9     Eosinophil Rel %  5.1     Basophil Rel %  1.1       CMP          11/21/2023    08:39 12/29/2023    10:16 2/5/2024    10:05   CMP   Glucose 110   82    BUN 9   9    Creatinine 0.73   0.35    EGFR 108.8   135.2    Sodium 140   140    Potassium 4.5   4.7    Chloride 104   103    Calcium 10.0   9.7    Total Protein 7.6  7.7  8.5    Albumin 4.7  4.3  4.6    Globulin 2.9   3.9    Total Bilirubin 0.2  <0.2  0.2    Alkaline Phosphatase 57  53  58    AST (SGOT) 66  35  17    ALT (SGPT) 47  28  16    Albumin/Globulin Ratio 1.6   1.2    BUN/Creatinine Ratio 12.3   25.7    Anion Gap 12.0   13.5        PT/INR   Protime   Date Value Ref Range Status   12/29/2023 12.3 11.8 - 14.9 Seconds Final     INR   Date Value Ref Range Status   12/29/2023 0.90 0.86 - 1.15 Final               Assessment and Plan    Diagnoses and all orders for this visit:    1. Fatty liver (Primary)  -     Liver Elastography; Future  -     Protime-INR; Future  -     Hepatic Function Panel; Future  -     CBC (No Diff); Future  -     Iron Profile; Future  -     Ferritin; Future    2. Class 1 obesity with serious comorbidity and body mass index (BMI) of 30.0 to 30.9 in adult, unspecified obesity type              Follow Up   Return in about 6 months (around  1/25/2025).  Fibroscan   Labs in August Cont low carb diet and weight loss, 2-4 c. Coffee/d    Patient was given instructions and counseling regarding her condition or for health maintenance advice. Please see specific information pulled into the AVS if appropriate.

## 2024-07-25 ENCOUNTER — OFFICE VISIT (OUTPATIENT)
Dept: GASTROENTEROLOGY | Facility: CLINIC | Age: 39
End: 2024-07-25
Payer: COMMERCIAL

## 2024-07-25 VITALS
DIASTOLIC BLOOD PRESSURE: 89 MMHG | HEART RATE: 84 BPM | BODY MASS INDEX: 30.73 KG/M2 | HEIGHT: 64 IN | WEIGHT: 180 LBS | SYSTOLIC BLOOD PRESSURE: 118 MMHG

## 2024-07-25 DIAGNOSIS — K76.0 FATTY LIVER: Primary | ICD-10-CM

## 2024-07-25 DIAGNOSIS — E66.9 CLASS 1 OBESITY WITH SERIOUS COMORBIDITY AND BODY MASS INDEX (BMI) OF 30.0 TO 30.9 IN ADULT, UNSPECIFIED OBESITY TYPE: ICD-10-CM

## 2024-08-12 ENCOUNTER — OFFICE VISIT (OUTPATIENT)
Dept: INTERNAL MEDICINE | Facility: CLINIC | Age: 39
End: 2024-08-12
Payer: COMMERCIAL

## 2024-08-12 VITALS
HEIGHT: 64 IN | HEART RATE: 84 BPM | TEMPERATURE: 96.3 F | RESPIRATION RATE: 18 BRPM | BODY MASS INDEX: 30.56 KG/M2 | WEIGHT: 179 LBS | OXYGEN SATURATION: 98 % | SYSTOLIC BLOOD PRESSURE: 118 MMHG | DIASTOLIC BLOOD PRESSURE: 78 MMHG

## 2024-08-12 DIAGNOSIS — L85.3 DRY SKIN DERMATITIS: ICD-10-CM

## 2024-08-12 DIAGNOSIS — R76.8 POSITIVE ANA (ANTINUCLEAR ANTIBODY): ICD-10-CM

## 2024-08-12 DIAGNOSIS — M79.10 MYALGIA: Primary | ICD-10-CM

## 2024-08-12 DIAGNOSIS — F41.1 GAD (GENERALIZED ANXIETY DISORDER): ICD-10-CM

## 2024-08-12 DIAGNOSIS — F33.0 MAJOR DEPRESSIVE DISORDER, RECURRENT, MILD: ICD-10-CM

## 2024-08-12 PROCEDURE — 99214 OFFICE O/P EST MOD 30 MIN: CPT | Performed by: NURSE PRACTITIONER

## 2024-08-12 RX ORDER — ERGOCALCIFEROL 1.25 MG/1
50000 CAPSULE ORAL WEEKLY
Qty: 13 CAPSULE | Refills: 1 | Status: SHIPPED | OUTPATIENT
Start: 2024-08-12

## 2024-08-12 RX ORDER — NAPROXEN 500 MG/1
500 TABLET ORAL 2 TIMES DAILY WITH MEALS
Qty: 180 TABLET | Refills: 0 | Status: SHIPPED | OUTPATIENT
Start: 2024-08-12

## 2024-08-12 RX ORDER — METHOCARBAMOL 500 MG/1
500 TABLET, FILM COATED ORAL 3 TIMES DAILY PRN
Qty: 60 TABLET | Refills: 1 | Status: SHIPPED | OUTPATIENT
Start: 2024-08-12 | End: 2024-08-12

## 2024-08-12 RX ORDER — DULOXETIN HYDROCHLORIDE 30 MG/1
30 CAPSULE, DELAYED RELEASE ORAL DAILY
Qty: 90 CAPSULE | Refills: 0 | Status: SHIPPED | OUTPATIENT
Start: 2024-08-12

## 2024-08-12 RX ORDER — METHOCARBAMOL 500 MG/1
500 TABLET, FILM COATED ORAL 3 TIMES DAILY PRN
Qty: 270 TABLET | Refills: 1 | Status: SHIPPED | OUTPATIENT
Start: 2024-08-12

## 2024-08-12 NOTE — PROGRESS NOTES
"Chief Complaint  Myalgia (6 week follow up ), Dry skin dermatitis, and Anxiety    Subjective          Minh Drake presents to [facility]  History of Present Illness  Myalgia-positive STEFANI in march. Saw rheum in may, f/u next month  Does not feel like diclofenac is working as well as naproxen  Robaxin helping, taking 2-3x per day    Anxiety slightly worse since increasing cymbalta  Was doing better with 30mg dose  Denies SI/HI    Dry skin dermatitis-improved with use a otc creams  Objective   Vital Signs:   /78 (BP Location: Left arm, Patient Position: Sitting, Cuff Size: Adult)   Pulse 84   Temp 96.3 °F (35.7 °C)   Resp 18   Ht 162.6 cm (64\")   Wt 81.2 kg (179 lb)   SpO2 98%   BMI 30.73 kg/m²     Physical Exam  Vitals and nursing note reviewed.   Constitutional:       General: She is not in acute distress.     Appearance: Normal appearance.   HENT:      Head: Normocephalic and atraumatic.      Right Ear: External ear normal.      Left Ear: External ear normal.      Nose: Nose normal.      Mouth/Throat:      Mouth: Mucous membranes are moist.   Eyes:      Conjunctiva/sclera: Conjunctivae normal.   Cardiovascular:      Rate and Rhythm: Normal rate and regular rhythm.      Pulses: Normal pulses.      Heart sounds: Normal heart sounds. No murmur heard.     No friction rub. No gallop.   Pulmonary:      Effort: Pulmonary effort is normal. No respiratory distress.      Breath sounds: No wheezing, rhonchi or rales.   Musculoskeletal:      Cervical back: Neck supple.      Right lower leg: No edema.      Left lower leg: No edema.   Skin:     General: Skin is warm and dry.   Neurological:      General: No focal deficit present.      Mental Status: She is alert and oriented to person, place, and time.   Psychiatric:         Mood and Affect: Mood normal.         Behavior: Behavior normal.        Result Review :          Procedures      Assessment and Plan  "   Diagnoses and all orders for this visit:    1. Myalgia (Primary)  Comments:  will switch back to naproxen and stop diclofenac    2. EDEN (generalized anxiety disorder)  Comments:  decreasing back to cymbalta 30mg    3. Major depressive disorder, recurrent, mild    4. Dry skin dermatitis  Comments:  improved. continue with regular moisturizer    5. Positive STEFANI (antinuclear antibody)  Comments:  will follow up with rheum next week    Other orders  -     Discontinue: methocarbamol (ROBAXIN) 500 MG tablet; Take 1 tablet by mouth 3 (Three) Times a Day As Needed for Muscle Spasms.  Dispense: 60 tablet; Refill: 1  -     Discontinue: diclofenac (VOLTAREN) 50 MG EC tablet; Take 1 tablet by mouth 2 (Two) Times a Day.  Dispense: 180 tablet; Refill: 0  -     vitamin D (ERGOCALCIFEROL) 1.25 MG (73845 UT) capsule capsule; Take 1 capsule by mouth 1 (One) Time Per Week.  Dispense: 13 capsule; Refill: 1  -     DULoxetine (Cymbalta) 30 MG capsule; Take 1 capsule by mouth Daily.  Dispense: 90 capsule; Refill: 0  -     naproxen (Naprosyn) 500 MG tablet; Take 1 tablet by mouth 2 (Two) Times a Day With Meals.  Dispense: 180 tablet; Refill: 0  -     methocarbamol (ROBAXIN) 500 MG tablet; Take 1 tablet by mouth 3 (Three) Times a Day As Needed for Muscle Spasms.  Dispense: 270 tablet; Refill: 1              Follow Up   Return in about 8 weeks (around 10/7/2024).  Patient was given instructions and counseling regarding her condition or for health maintenance advice. Please see specific information pulled into the AVS if appropriate.

## 2024-08-14 ENCOUNTER — TELEPHONE (OUTPATIENT)
Dept: GASTROENTEROLOGY | Facility: CLINIC | Age: 39
End: 2024-08-14
Payer: COMMERCIAL

## 2024-08-14 NOTE — TELEPHONE ENCOUNTER
Spoke with patient to confirm Fibroscan appt 08/16/2024 @ 845am. Advised nothing to eat or drink 3hrs prior. She verbalized understanding.

## 2024-08-16 ENCOUNTER — PROCEDURE VISIT (OUTPATIENT)
Dept: OTHER | Facility: HOSPITAL | Age: 39
End: 2024-08-16
Payer: COMMERCIAL

## 2024-08-16 DIAGNOSIS — K76.0 FATTY LIVER: ICD-10-CM

## 2024-08-16 PROCEDURE — 91200 LIVER ELASTOGRAPHY: CPT | Performed by: NURSE PRACTITIONER

## 2024-08-20 NOTE — PROGRESS NOTES
Liver Elastography    Performed by: Elina Ragland APRN  Authorized by: Stefanie Cueva APRN  Ordering Provider: Stefanie Cueva APRN    Probe:  M+  Procedure Details:  Procedure: After providing an oral and written explanation of the Fibroscan vibration controlled transient elastography (VTCE) test procedure to the patient. The patient was placed in supine position with right arm in maximum abduction to allow optimal exposure of right lateral abdomen. Patient was briefly assessed, identifying terminus of the xyphoid process and locating an ideal transient elastography testing site, midline and lateral to this point. Patient was instructed to breathe normally and remain stationary during the test process. Pre-measurement data confirmed the transient elastography probe was centered over the liver parenchyma. A series of ten 50 Hz mechanical pulses were applied with controlled application pressure to induce a mechanical shear wave in the liver tissue. For each measurement, the shear wave propagation speed was detected, displayed and converted to its equivalent liver stiffness value in kilopascals. Skin to liver capsules distance and shear wave characteristics were monitored during the entire examination to assure quality data. Median liver stiffness measurement and interquartile range were calculated and displayed in real time. Acquired measurement data was stored and submitted to the provider for review and interpretation. Patient tolerated the procedure well and was discharged without incident.   Clinical Information:     NPO 3 Hours or More: Yes      ALT Level Below 100 IU: Yes      Actively Drinking: No      T Bili/Alk Phos Below 2xULN: Yes    Findings:     Median Liver Stiffness Score:  10.4    Interquartile Range (IQR) to Median Ratio:  21    Nereyda Stiffness Consistent with:  F3 Significant Fibrosis    Current Scan Considered Reliab: Yes      Median Controlled Attenuation Parameter  (dB/m):  304    IQR:  19    CAP SCORE:  Moderate/severe liver fat

## 2024-08-20 NOTE — PROGRESS NOTES
Moderate to severe liver fat, F3-this is consistent with bridging fibrosis  Move up follow-up appointment, will discuss Rezdiffra  with the patient

## 2024-08-22 ENCOUNTER — TELEPHONE (OUTPATIENT)
Dept: GASTROENTEROLOGY | Facility: CLINIC | Age: 39
End: 2024-08-22
Payer: COMMERCIAL

## 2024-08-22 NOTE — TELEPHONE ENCOUNTER
"Hub staff attempted to follow warm transfer process and was unsuccessful     Caller: Minh Drake    Relationship to patient: Self    Best call back number: [phone]    Patient is needing: NOTE IN MYCHART READS \"Moderate to severe liver fat, F3-this is consistent with bridging fibrosis  Move up follow-up appointment, will discuss Rezdiffra  with the patient\". PT CALLING TO GET APPT. MOVED UP.   "

## 2024-09-05 NOTE — PROGRESS NOTES
GYN Problem/Follow Up Visit    Chief Complaint   Patient presents with    Follow-up     Wants hormone levels tested           HPI  Minh Drake is a 39 y.o. female, , who presents for discuss hormones. Experiencing peripheral swelling at times in her hands and lower extremities, intermittent numbness and musculoskeletal pain. Is scheduled to see hematology upcoming, hx of elevated liver enzymes.   Hx of hepatic steatosis. Was seen by rheumatology for elevated STEFANI, per patient told symptoms likely not rheumatologic.     Family history of breast cancer in sister age 44. Desires breast screening and orders for breast cancer screening. Prior hereditary cancer testing was negative for clinically significant mutation  Denies breast lump, mass, skin changes or nipple discharge       Additional OB/GYN History   No LMP recorded. Patient has had an implant.  Current contraception: contraceptive methods: NuvaRing vaginal inserts    Past Medical History:   Diagnosis Date    Allergic rhinitis     Asthma     4th grade    Broken bones     Elementary    Migraine headache     Sinus trouble     Strep throat 2024      Past Surgical History:   Procedure Laterality Date    COLONOSCOPY N/A 2023    Procedure: COLONOSCOPY WITH BIOPSIES;  Surgeon: Tee Melo MD;  Location: [facility] ENDOSCOPY;  Service: Gastroenterology;  Laterality: N/A;  NORMAL    WISDOM TOOTH EXTRACTION        Family History   Problem Relation Age of Onset    Prostate cancer Father     Other Mother         Cerebrovascular Accident    Breast cancer Sister 44    Colon cancer Neg Hx     Ovarian cancer Neg Hx     Uterine cancer Neg Hx      Allergies as of 2024 - Reviewed 2024   Allergen Reaction Noted    Brompheniramine-pseudoeph Rash 2022    Cephalexin Rash 08/10/2021      The additional following portions of the patient's history were reviewed and updated as appropriate: allergies, current medications, past family history, past  "medical history, past social history, past surgical history, and problem list.    Review of Systems    See HPI for pertinent ROS    Objective   /87   Pulse 94   Ht 162.6 cm (64\")   Wt 83 kg (183 lb)   Breastfeeding No   BMI 31.41 kg/m²     Physical Exam  Vitals and nursing note reviewed. Exam conducted with a chaperone present.   Constitutional:       General: She is not in acute distress.     Appearance: Normal appearance. She is well-developed and well-groomed.   Cardiovascular:      Rate and Rhythm: Normal rate and regular rhythm.      Heart sounds: Normal heart sounds.   Pulmonary:      Effort: Pulmonary effort is normal.      Breath sounds: Normal breath sounds.   Chest:   Breasts:     Breasts are symmetrical.      Right: Normal. No inverted nipple, mass, nipple discharge, skin change or tenderness.      Left: Normal. No inverted nipple, mass, nipple discharge, skin change or tenderness.   Lymphadenopathy:      Cervical: No cervical adenopathy.      Upper Body:      Right upper body: No supraclavicular, axillary or pectoral adenopathy.      Left upper body: No supraclavicular, axillary or pectoral adenopathy.   Skin:     General: Skin is warm and dry.   Neurological:      Mental Status: She is alert and oriented to person, place, and time.   Psychiatric:         Mood and Affect: Affect normal.         Behavior: Behavior is cooperative.         Cognition and Memory: Cognition normal.              Assessment and Plan    Diagnoses and all orders for this visit:    1. Peripheral edema (Primary)    2. At high risk for breast cancer  -     MRI Breast Bilateral Diagnostic W WO Contrast; Future  -     Mammo Screening Digital Tomosynthesis Bilateral With CAD; Future        Counseling:  All BIRTH CONTROL options R/B/A/SE/E of each reviewed in detail.  She desires to continue the nuvaring contraceptive. I think less likely her concerns for swelling and arthralgia are related to use of low dose nuvaring and she " desires pregnancy prevention. I recommend she see her pcp to discuss non gyn causes of peripheral edema, follow up on her liver enzymes. She is seeing hematology upcoming.   She has family hx of breast cancer in her sister. I recommend annual breast mri and mammography screening, she is scheduled for her annual wwe in December however request breast cancer screening orders to be placed today     She understands the importance of having any ordered tests to be performed in a timely fashion.  The risks of not performing them include, but are not limited to, advanced cancer stages, bone loss from osteoporosis and/or subsequent increase in morbidity and/or mortality.  She is encouraged to review her results online and/or contact or office if she has questions.     Follow Up:  Return for for WWE as scheduled.        Ivis Jain, APRN  09/13/2024

## 2024-09-09 ENCOUNTER — TELEPHONE (OUTPATIENT)
Dept: GASTROENTEROLOGY | Facility: CLINIC | Age: 39
End: 2024-09-09
Payer: COMMERCIAL

## 2024-09-09 ENCOUNTER — LAB (OUTPATIENT)
Dept: LAB | Facility: HOSPITAL | Age: 39
End: 2024-09-09
Payer: COMMERCIAL

## 2024-09-09 DIAGNOSIS — K76.0 FATTY LIVER: ICD-10-CM

## 2024-09-09 LAB
ALBUMIN SERPL-MCNC: 4.4 G/DL (ref 3.5–5.2)
ALP SERPL-CCNC: 52 U/L (ref 39–117)
ALT SERPL W P-5'-P-CCNC: 29 U/L (ref 1–33)
AST SERPL-CCNC: 39 U/L (ref 1–32)
BILIRUB CONJ SERPL-MCNC: <0.2 MG/DL (ref 0–0.3)
BILIRUB INDIRECT SERPL-MCNC: ABNORMAL MG/DL
BILIRUB SERPL-MCNC: 0.2 MG/DL (ref 0–1.2)
DEPRECATED RDW RBC AUTO: 40.2 FL (ref 37–54)
ERYTHROCYTE [DISTWIDTH] IN BLOOD BY AUTOMATED COUNT: 11.9 % (ref 12.3–15.4)
FERRITIN SERPL-MCNC: 785 NG/ML (ref 13–150)
HCT VFR BLD AUTO: 40.4 % (ref 34–46.6)
HGB BLD-MCNC: 13.4 G/DL (ref 12–15.9)
INR PPP: 0.98 (ref 0.86–1.15)
IRON 24H UR-MRATE: 146 MCG/DL (ref 37–145)
IRON SATN MFR SERPL: 28 % (ref 20–50)
MCH RBC QN AUTO: 30.7 PG (ref 26.6–33)
MCHC RBC AUTO-ENTMCNC: 33.2 G/DL (ref 31.5–35.7)
MCV RBC AUTO: 92.4 FL (ref 79–97)
PLATELET # BLD AUTO: 294 10*3/MM3 (ref 140–450)
PMV BLD AUTO: 9.8 FL (ref 6–12)
PROT SERPL-MCNC: 7.9 G/DL (ref 6–8.5)
PROTHROMBIN TIME: 13.2 SECONDS (ref 11.8–14.9)
RBC # BLD AUTO: 4.37 10*6/MM3 (ref 3.77–5.28)
TIBC SERPL-MCNC: 526 MCG/DL (ref 298–536)
TRANSFERRIN SERPL-MCNC: 353 MG/DL (ref 200–360)
WBC NRBC COR # BLD AUTO: 10.1 10*3/MM3 (ref 3.4–10.8)

## 2024-09-09 PROCEDURE — 80076 HEPATIC FUNCTION PANEL: CPT

## 2024-09-09 PROCEDURE — 84466 ASSAY OF TRANSFERRIN: CPT

## 2024-09-09 PROCEDURE — 36415 COLL VENOUS BLD VENIPUNCTURE: CPT

## 2024-09-09 PROCEDURE — 85027 COMPLETE CBC AUTOMATED: CPT

## 2024-09-09 PROCEDURE — 85610 PROTHROMBIN TIME: CPT

## 2024-09-09 PROCEDURE — 82728 ASSAY OF FERRITIN: CPT

## 2024-09-09 PROCEDURE — 83540 ASSAY OF IRON: CPT

## 2024-09-10 ENCOUNTER — TELEPHONE (OUTPATIENT)
Dept: GASTROENTEROLOGY | Facility: CLINIC | Age: 39
End: 2024-09-10
Payer: COMMERCIAL

## 2024-09-10 DIAGNOSIS — K76.0 FATTY LIVER: Primary | ICD-10-CM

## 2024-09-10 DIAGNOSIS — Z14.8 HEMOCHROMATOSIS CARRIER: ICD-10-CM

## 2024-09-10 NOTE — TELEPHONE ENCOUNTER
----- Message from Stefanie Cueva sent at 9/10/2024  3:55 PM EDT -----  Please notify patient ferritin is up a little, iron also slightly elevated, please ensure patient is not taking any type of multivitamin with iron in it, if so discontinue  Would recommend appointment with hematology as patient did have 1 gene detected with hemochromatosis

## 2024-09-13 ENCOUNTER — OFFICE VISIT (OUTPATIENT)
Dept: OBSTETRICS AND GYNECOLOGY | Facility: CLINIC | Age: 39
End: 2024-09-13
Payer: COMMERCIAL

## 2024-09-13 VITALS
BODY MASS INDEX: 31.24 KG/M2 | HEART RATE: 94 BPM | DIASTOLIC BLOOD PRESSURE: 87 MMHG | WEIGHT: 183 LBS | SYSTOLIC BLOOD PRESSURE: 126 MMHG | HEIGHT: 64 IN

## 2024-09-13 DIAGNOSIS — R60.0 PERIPHERAL EDEMA: Primary | ICD-10-CM

## 2024-09-13 DIAGNOSIS — Z91.89 AT HIGH RISK FOR BREAST CANCER: ICD-10-CM

## 2024-09-18 ENCOUNTER — CONSULT (OUTPATIENT)
Dept: ONCOLOGY | Facility: HOSPITAL | Age: 39
End: 2024-09-18
Payer: COMMERCIAL

## 2024-09-18 VITALS
HEIGHT: 64 IN | WEIGHT: 180.2 LBS | TEMPERATURE: 98.6 F | HEART RATE: 86 BPM | OXYGEN SATURATION: 99 % | BODY MASS INDEX: 30.77 KG/M2 | SYSTOLIC BLOOD PRESSURE: 122 MMHG | RESPIRATION RATE: 16 BRPM | DIASTOLIC BLOOD PRESSURE: 91 MMHG

## 2024-09-18 DIAGNOSIS — Z14.8 HEMOCHROMATOSIS CARRIER: Primary | ICD-10-CM

## 2024-09-18 DIAGNOSIS — R79.89 ELEVATED FERRITIN: ICD-10-CM

## 2024-09-18 PROBLEM — E83.110 HEMOCHROMATOSIS ASSOCIATED WITH MUTATION IN HFE GENE: Status: ACTIVE | Noted: 2024-09-18

## 2024-09-18 PROBLEM — K76.0 FATTY LIVER DISEASE, NONALCOHOLIC: Status: ACTIVE | Noted: 2024-09-18

## 2024-09-18 PROCEDURE — G0463 HOSPITAL OUTPT CLINIC VISIT: HCPCS | Performed by: NURSE PRACTITIONER

## 2024-09-19 ENCOUNTER — OFFICE VISIT (OUTPATIENT)
Dept: INTERNAL MEDICINE | Facility: CLINIC | Age: 39
End: 2024-09-19
Payer: COMMERCIAL

## 2024-09-19 VITALS
HEART RATE: 99 BPM | WEIGHT: 181 LBS | RESPIRATION RATE: 18 BRPM | TEMPERATURE: 96.6 F | DIASTOLIC BLOOD PRESSURE: 80 MMHG | BODY MASS INDEX: 30.9 KG/M2 | SYSTOLIC BLOOD PRESSURE: 126 MMHG | OXYGEN SATURATION: 97 % | HEIGHT: 64 IN

## 2024-09-19 DIAGNOSIS — M79.10 MYALGIA: ICD-10-CM

## 2024-09-19 DIAGNOSIS — R76.8 POSITIVE ANA (ANTINUCLEAR ANTIBODY): ICD-10-CM

## 2024-09-19 DIAGNOSIS — F33.0 MAJOR DEPRESSIVE DISORDER, RECURRENT, MILD: ICD-10-CM

## 2024-09-19 DIAGNOSIS — R60.0 EDEMA, LOWER EXTREMITY: Primary | ICD-10-CM

## 2024-09-19 DIAGNOSIS — R79.89 ELEVATED FERRITIN: ICD-10-CM

## 2024-09-19 DIAGNOSIS — E83.110 HEMOCHROMATOSIS ASSOCIATED WITH MUTATION IN HFE GENE: ICD-10-CM

## 2024-09-19 DIAGNOSIS — F41.1 GAD (GENERALIZED ANXIETY DISORDER): ICD-10-CM

## 2024-09-19 PROCEDURE — 90471 IMMUNIZATION ADMIN: CPT | Performed by: NURSE PRACTITIONER

## 2024-09-19 PROCEDURE — 90472 IMMUNIZATION ADMIN EACH ADD: CPT | Performed by: NURSE PRACTITIONER

## 2024-09-19 PROCEDURE — 99214 OFFICE O/P EST MOD 30 MIN: CPT | Performed by: NURSE PRACTITIONER

## 2024-09-19 PROCEDURE — 90656 IIV3 VACC NO PRSV 0.5 ML IM: CPT | Performed by: NURSE PRACTITIONER

## 2024-09-19 PROCEDURE — 90677 PCV20 VACCINE IM: CPT | Performed by: NURSE PRACTITIONER

## 2024-09-19 RX ORDER — CETIRIZINE HYDROCHLORIDE 10 MG/1
10 TABLET ORAL DAILY
Qty: 90 TABLET | Refills: 1 | Status: SHIPPED | OUTPATIENT
Start: 2024-09-19

## 2024-09-19 RX ORDER — BUSPIRONE HYDROCHLORIDE 5 MG/1
5 TABLET ORAL 3 TIMES DAILY PRN
Qty: 90 TABLET | Refills: 0 | Status: SHIPPED | OUTPATIENT
Start: 2024-09-19

## 2024-09-23 ENCOUNTER — HOSPITAL ENCOUNTER (OUTPATIENT)
Dept: ONCOLOGY | Facility: HOSPITAL | Age: 39
Discharge: HOME | End: 2024-09-23
Admitting: NURSE PRACTITIONER
Payer: COMMERCIAL

## 2024-09-23 VITALS
HEART RATE: 97 BPM | TEMPERATURE: 98 F | DIASTOLIC BLOOD PRESSURE: 70 MMHG | OXYGEN SATURATION: 100 % | SYSTOLIC BLOOD PRESSURE: 111 MMHG | RESPIRATION RATE: 18 BRPM

## 2024-09-23 DIAGNOSIS — R79.89 ELEVATED FERRITIN: Primary | ICD-10-CM

## 2024-09-23 DIAGNOSIS — K76.0 FATTY LIVER DISEASE, NONALCOHOLIC: ICD-10-CM

## 2024-09-23 DIAGNOSIS — E83.110 HEMOCHROMATOSIS ASSOCIATED WITH MUTATION IN HFE GENE: ICD-10-CM

## 2024-09-23 PROCEDURE — 99195 PHLEBOTOMY: CPT

## 2024-10-03 ENCOUNTER — OFFICE VISIT (OUTPATIENT)
Dept: INTERNAL MEDICINE | Facility: CLINIC | Age: 39
End: 2024-10-03
Payer: COMMERCIAL

## 2024-10-03 VITALS
DIASTOLIC BLOOD PRESSURE: 68 MMHG | RESPIRATION RATE: 18 BRPM | BODY MASS INDEX: 31.07 KG/M2 | HEART RATE: 86 BPM | OXYGEN SATURATION: 97 % | TEMPERATURE: 96.2 F | HEIGHT: 64 IN | SYSTOLIC BLOOD PRESSURE: 114 MMHG | WEIGHT: 182 LBS

## 2024-10-03 DIAGNOSIS — E78.2 MIXED HYPERLIPIDEMIA: Primary | ICD-10-CM

## 2024-10-03 DIAGNOSIS — R79.89 ELEVATED FERRITIN: ICD-10-CM

## 2024-10-03 DIAGNOSIS — E83.119 HEMOCHROMATOSIS, UNSPECIFIED HEMOCHROMATOSIS TYPE: ICD-10-CM

## 2024-10-03 LAB
CHOLEST SERPL-MCNC: 211 MG/DL (ref 0–200)
HDLC SERPL-MCNC: 43 MG/DL (ref 40–60)
LDLC SERPL CALC-MCNC: 105 MG/DL (ref 0–100)
LDLC/HDLC SERPL: 2.19 {RATIO}
TRIGL SERPL-MCNC: 369 MG/DL (ref 0–150)
VLDLC SERPL-MCNC: 63 MG/DL (ref 5–40)

## 2024-10-03 PROCEDURE — 80061 LIPID PANEL: CPT | Performed by: NURSE PRACTITIONER

## 2024-10-03 PROCEDURE — 99214 OFFICE O/P EST MOD 30 MIN: CPT | Performed by: NURSE PRACTITIONER

## 2024-10-03 NOTE — PROGRESS NOTES
"Chief Complaint  Edema (Follow up - better) and Joint Pain (Wrist and ankles for 2 weeks)      Subjective      History of Present Illness  The patient is a 39-year-old female who presents for follow-up.    She reports that her massage therapist noted an improvement in her inflammation and puffiness.     She underwent phlebotomy two wks ago and has been mindful of her diet since then.  He reports that swelling and joint pain have improved since this procedure.    She is planning to travel [place] on [place]. She received her pneumonia and influenza vaccines, and her last COVID-19 vaccine was administered on 04/11/2024. She is considering getting another COVID-19 vaccine before her trip.    She is needing physical form completed for work.  Needing cholesterol levels at this time.         Objective   Vital Signs:   Vitals:    10/03/24 1134   BP: 114/68   BP Location: Left arm   Patient Position: Sitting   Cuff Size: Adult   Pulse: 86   Resp: 18   Temp: 96.2 °F (35.7 °C)   SpO2: 97%   Weight: 82.6 kg (182 lb)   Height: 162.6 cm (64\")     Body mass index is 31.24 kg/m².    Wt Readings from Last 3 Encounters:   10/03/24 82.6 kg (182 lb)   09/19/24 82.1 kg (181 lb)   09/18/24 81.7 kg (180 lb 3.2 oz)     BP Readings from Last 3 Encounters:   10/03/24 114/68   09/23/24 111/70   09/19/24 126/80       Health Maintenance   Topic Date Due    ANNUAL PHYSICAL  11/21/2024    LIPID PANEL  11/21/2024    BMI FOLLOWUP  08/12/2025    PAP SMEAR  12/07/2025    TDAP/TD VACCINES (2 - Td or Tdap) 02/05/2026    COLORECTAL CANCER SCREENING  12/12/2031    HEPATITIS C SCREENING  Completed    COVID-19 Vaccine  Completed    Pneumococcal Vaccine 0-64  Completed    INFLUENZA VACCINE  Completed       Physical Exam  Vitals and nursing note reviewed.   Constitutional:       General: She is not in acute distress.     Appearance: Normal appearance.   HENT:      Head: Normocephalic and atraumatic.      Right Ear: External ear normal.      Left Ear: " External ear normal.      Nose: Nose normal.      Mouth/Throat:      Mouth: Mucous membranes are moist.   Eyes:      Conjunctiva/sclera: Conjunctivae normal.   Cardiovascular:      Rate and Rhythm: Normal rate and regular rhythm.      Pulses: Normal pulses.      Heart sounds: Normal heart sounds. No murmur heard.     No friction rub. No gallop.   Pulmonary:      Effort: Pulmonary effort is normal. No respiratory distress.      Breath sounds: No wheezing, rhonchi or rales.   Musculoskeletal:      Cervical back: Neck supple.      Right lower leg: No edema.      Left lower leg: No edema.   Skin:     General: Skin is warm and dry.   Neurological:      General: No focal deficit present.      Mental Status: She is alert and oriented to person, place, and time.   Psychiatric:         Mood and Affect: Mood normal.         Behavior: Behavior normal.        Physical Exam        Result Review :  The following data was reviewed by: KYUNG Thapa on 10/03/2024:         Results              Procedures            Assessment & Plan  Mixed hyperlipidemia     Hemochromatosis, unspecified hemochromatosis type    Elevated ferritin      Orders Placed This Encounter   Procedures    Lipid Panel             Assessment & Plan  1. Joint Pain.  The patient reports experiencing joint pain starting three days after a procedure. She has been seeing a massage therapist who noted some improvement in inflammation and puffiness. She is advised to continue monitoring her symptoms and avoid excessive iron intake.    2. Iron Overload.  The patient is managing her iron levels by watching her diet to avoid excessive iron intake. She is advised to avoid combining high-iron foods with vitamin C-rich foods or drinks, such as orange juice, as vitamin C can enhance iron absorption. She is currently taking B12 and D4 supplements as recommended.    3. Health Maintenance.  She received pneumonia and flu vaccines recently. Her last COVID-19 vaccine was on  April 11, 2024, and it is not flagged as due for another shot at this time. She is advised to maintain good hand hygiene and avoid contact with individuals exhibiting symptoms of illness.     4. HLD  Labs today      Patient or patient representative verbalized consent for the use of Ambient Listening during the visit with  KYUNG Thapa for chart documentation. 10/3/2024  12:49 EDT      FOLLOW UP  No follow-ups on file.  Patient was given instructions and counseling regarding her condition or for health maintenance advice. Please see specific information pulled into the AVS if appropriate.     KYUNG Thapa  10/03/24  12:49 EDT    CURRENT & DISCONTINUED MEDICATIONS  Current Outpatient Medications   Medication Instructions    busPIRone (BUSPAR) 5 mg, Oral, 3 Times Daily PRN    cetirizine (ZYRTEC) 10 mg, Oral, Daily    DULoxetine (CYMBALTA) 30 mg, Oral, Daily    hydrOXYzine (ATARAX) 25 mg, Oral, Every Night at Bedtime    Melatonin 10 MG tablet 1 tablet, Oral, Nightly PRN    methocarbamol (ROBAXIN) 500 mg, Oral, 3 Times Daily PRN    naproxen (NAPROSYN) 500 mg, Oral, 2 Times Daily With Meals    NuvaRing 0.12-0.015 MG/24HR vaginal ring 1 each, Vaginal, Every 28 Days, Insert vaginally and leave in place for 3 consecutive weeks, then remove for 1 week.    triamcinolone (KENALOG) 0.1 % cream 1 Application, Topical, 2 Times Daily    Triamcinolone Acetonide (Nasacort Allergy 24HR) 55 MCG/ACT nasal inhaler 2 sprays, Nasal, Daily    vitamin B-12 (CYANOCOBALAMIN) 1,000 mcg, Oral, Daily    vitamin D (ERGOCALCIFEROL) 50,000 Units, Oral, Weekly       Medications Discontinued During This Encounter   Medication Reason    albuterol sulfate  (90 Base) MCG/ACT inhaler

## 2024-10-15 ENCOUNTER — TELEPHONE (OUTPATIENT)
Dept: INTERNAL MEDICINE | Facility: CLINIC | Age: 39
End: 2024-10-15

## 2024-10-15 NOTE — TELEPHONE ENCOUNTER
Yes I have the preventive screening paperwork form. I have printed the FMLA paperwork from patients mychart for provider to complete.

## 2024-10-15 NOTE — TELEPHONE ENCOUNTER
[employer] message has been sent to patient. preventive screening form has been completed and placed up front for . [facility] paperwork is in review with provider and the office will contact patient when completed.

## 2024-10-15 NOTE — TELEPHONE ENCOUNTER
Caller: Minh Drake    Relationship to patient: Self    Best call back number: [phone]      PATIENT IS REQUESTING A STATUS FOR FMLA PAPERWORK AND ALSO PAPERWORK REGARDING PREVENTIVE CARE.

## 2024-11-05 ENCOUNTER — APPOINTMENT (OUTPATIENT)
Dept: MRI IMAGING | Facility: HOSPITAL | Age: 39
End: 2024-11-05
Payer: COMMERCIAL

## 2024-11-06 ENCOUNTER — OFFICE VISIT (OUTPATIENT)
Dept: GASTROENTEROLOGY | Facility: CLINIC | Age: 39
End: 2024-11-06
Payer: COMMERCIAL

## 2024-11-06 VITALS
SYSTOLIC BLOOD PRESSURE: 136 MMHG | DIASTOLIC BLOOD PRESSURE: 97 MMHG | WEIGHT: 182 LBS | HEIGHT: 64 IN | HEART RATE: 81 BPM | BODY MASS INDEX: 31.07 KG/M2

## 2024-11-06 DIAGNOSIS — E66.811 CLASS 1 OBESITY WITH SERIOUS COMORBIDITY AND BODY MASS INDEX (BMI) OF 30.0 TO 30.9 IN ADULT, UNSPECIFIED OBESITY TYPE: ICD-10-CM

## 2024-11-06 DIAGNOSIS — R79.89 ELEVATED FERRITIN: ICD-10-CM

## 2024-11-06 DIAGNOSIS — R19.7 DIARRHEA, UNSPECIFIED TYPE: ICD-10-CM

## 2024-11-06 DIAGNOSIS — Z14.8 HEMOCHROMATOSIS CARRIER: ICD-10-CM

## 2024-11-06 DIAGNOSIS — K76.0 FATTY LIVER: Primary | ICD-10-CM

## 2024-11-06 NOTE — PROGRESS NOTES
Patient Name: Minh Zhao   Visit Date: 11/06/2024   Patient ID: [id number]  Provider: KYUNG Aguilar    Sex: female  Location:  Location Address:  Location Phone: [phone]  [place] [address] [phone]    YOB: 1985  Age: 39 y.o.   Primary Care Provider Jackelyn Helton APRN      Referring Provider: No ref. provider found        Chief Complaint  Fatty Liver (Follow up )    History of Present Illness    Patient initially presented 9/15/2022 with elevated LFTs, reported having alcohol maybe once a month.  Ultrasound of the liver in August showed fatty liver and common bile duct 6.4 mm with gallbladder, no gallstones seen  MRI of the abdomen with and without contrast MRCP 10/14/2022: Significant fatty liver, enlarged to 21 cm, CBD 4 mm, trace bilateral pleural effusions-patient advised to follow-up with primary care for this     Liver work-up negative other than ferritin 842, iron saturation was normal, iron was normal, as a precaution hemochromatosis gene test--  1 gene detected, heterozygous finding usually not associated with increased risk of clinical symptoms    Colonoscopy 12/12/2023: Normal colonoscopy, good prep  Random colon biopsies were negative  Last liver ultrasound 5/1/2024:Hepatic steatosis otherwise negative    Patient was last seen 7/25/2024 and was doing well, right lower quadrant pain had resolved she did not start vitamin E    FibroScan 8/16/2024: Moderate to severe liver fat, F3    Repeat labs 9/9/24, showed elevated Iron and Ferritin increased to 785  AST 39, ALT 29 , INR 0.98 - pt referred to heme    Pt states she has seen hematology, she had phlebotomy performed end of Sept and f/u labs in Dec ordered.   She has no GI c/o today, she states she went to [place] for a few weeks and has had some loose stools she relates to different diet and traveling, but she is also having normal formed stool, no persistent diarrhea, no abdominal pain  Past Medical History:  "  Diagnosis Date    Allergic rhinitis     Asthma     4th grade    Broken bones     Elementary    Hemochromatosis     Migraine headache     Sinus trouble     Strep throat 07/06/2024       Past Surgical History:   Procedure Laterality Date    COLONOSCOPY N/A 12/12/2023    Procedure: COLONOSCOPY WITH BIOPSIES;  Surgeon: Tee Melo MD;  Location: [facility] ENDOSCOPY;  Service: Gastroenterology;  Laterality: N/A;  NORMAL    WISDOM TOOTH EXTRACTION         Allergies   Allergen Reactions    Brompheniramine-Pseudoeph Rash    Cephalexin Rash       Family History   Problem Relation Age of Onset    Prostate cancer Father     Other Mother         Cerebrovascular Accident    Breast cancer Sister 44    Colon cancer Neg Hx     Ovarian cancer Neg Hx     Uterine cancer Neg Hx         Social History     Tobacco Use    Smoking status: Never    Smokeless tobacco: Never   Vaping Use    Vaping status: Never Used   Substance Use Topics    Alcohol use: Never    Drug use: Never       Objective     Vital Signs:   /97 (BP Location: Left arm, Patient Position: Sitting, Cuff Size: Adult)   Pulse 81   Ht 162.6 cm (64\")   Wt 82.6 kg (182 lb)   BMI 31.24 kg/m²       Physical Exam  Constitutional:       General: The patient is not in acute distress.     Appearance: Normal appearance.   HENT:      Head: Normocephalic and atraumatic.      Nose: Nose normal.   Pulmonary:      Effort: Pulmonary effort is normal. No respiratory distress.   Abdominal:      General: Abdomen is flat.      Palpations: Abdomen is soft. There is no mass.      Tenderness: There is no abdominal tenderness. There is no guarding.   Musculoskeletal:      Cervical back: Neck supple.      Right lower leg: No edema.      Left lower leg: No edema.   Skin:     General: Skin is warm and dry.   Neurological:      General: No focal deficit present.      Mental Status: The patient is alert and oriented to person, place, and time.      Gait: Gait normal.   Psychiatric:    "      Mood and Affect: Mood normal.         Speech: Speech normal.         Behavior: Behavior normal.         Thought Content: Thought content normal.     Result Review :   The following data was reviewed by: KYUNG Aguilar on 11/06/2024:    CBC w/diff          11/21/2023    08:39 12/29/2023    10:16 9/9/2024    10:34   CBC w/Diff   WBC 8.94  10.13  10.10    RBC 4.57  4.24  4.37    Hemoglobin 13.8  13.0  13.4    Hematocrit 41.5  38.0  40.4    MCV 90.8  89.6  92.4    MCH 30.2  30.7  30.7    MCHC 33.3  34.2  33.2    RDW 11.9  11.7  11.9    Platelets 299  285  294    Neutrophil Rel % 56.7      Immature Granulocyte Rel % 0.2      Lymphocyte Rel % 32.0      Monocyte Rel % 4.9      Eosinophil Rel % 5.1      Basophil Rel % 1.1        CMP          12/29/2023    10:16 2/5/2024    10:05 9/9/2024    10:34   CMP   Glucose  82     BUN  9     Creatinine  0.35     EGFR  135.2     Sodium  140     Potassium  4.7     Chloride  103     Calcium  9.7     Total Protein 7.7  8.5  7.9    Albumin 4.3  4.6  4.4    Globulin  3.9     Total Bilirubin <0.2  0.2  0.2    Alkaline Phosphatase 53  58  52    AST (SGOT) 35  17  39    ALT (SGPT) 28  16  29    Albumin/Globulin Ratio  1.2     BUN/Creatinine Ratio  25.7     Anion Gap  13.5         Liver Workup   A-1 Antitrypsin   Date Value Ref Range Status   09/15/2022 211 (H) 100 - 188 mg/dL Final     dsDNA   Date Value Ref Range Status   09/15/2022 Negative Negative Final     Expanded ANSHU Screen   Date Value Ref Range Status   09/15/2022 Negative Negative Final     Smooth Muscle Ab   Date Value Ref Range Status   09/15/2022 9 0 - 19 Units Final     Comment:                      Negative                     0 - 19                   Weak positive               20 - 30                   Moderate to strong positive     >30   Actin Antibodies are found in 52-85% of patients with   autoimmune hepatitis or chronic active hepatitis and   in 22% of patients with primary biliary cirrhosis.      Ceruloplasmin   Date Value Ref Range Status   09/15/2022 41 (H) 19 - 39 mg/dL Final     Ferritin   Date Value Ref Range Status   09/09/2024 785.00 (H) 13.00 - 150.00 ng/mL Final     IgG   Date Value Ref Range Status   09/15/2022 1343 586 - 1602 mg/dL Final     IgA   Date Value Ref Range Status   09/15/2022 219 87 - 352 mg/dL Final     IgM   Date Value Ref Range Status   09/15/2022 65 26 - 217 mg/dL Final     Iron   Date Value Ref Range Status   09/09/2024 146 (H) 37 - 145 mcg/dL Final     TIBC   Date Value Ref Range Status   09/09/2024 526 298 - 536 mcg/dL Final     Iron Saturation (TSAT)   Date Value Ref Range Status   09/09/2024 28 20 - 50 % Final     Transferrin   Date Value Ref Range Status   09/09/2024 353 200 - 360 mg/dL Final     Mitochondrial Ab   Date Value Ref Range Status   09/15/2022 <20.0 0.0 - 20.0 Units Final     Comment:                                     Negative    0.0 - 20.0                                  Equivocal  20.1 - 24.9                                  Positive         >24.9  Mitochondrial (M2) Antibodies are found in 90-96% of  patients with primary biliary cirrhosis.     Protime   Date Value Ref Range Status   09/09/2024 13.2 11.8 - 14.9 Seconds Final     INR   Date Value Ref Range Status   09/09/2024 0.98 0.86 - 1.15 Final     ALPHA-FETOPROTEIN   Date Value Ref Range Status   09/15/2022 2.86 0 - 8.3 ng/mL Final     Acute HEP Panel   Hepatitis B Surface Ag   Date Value Ref Range Status   09/15/2022 Non-Reactive Non-Reactive Final     Hep A IgM   Date Value Ref Range Status   09/15/2022 Non-Reactive Non-Reactive Final     Hep B C IgM   Date Value Ref Range Status   09/15/2022 Non-Reactive Non-Reactive Final     Hepatitis C Ab   Date Value Ref Range Status   09/15/2022 Non-Reactive Non-Reactive Final               Assessment and Plan    Diagnoses and all orders for this visit:    1. Fatty liver (Primary)  -     CBC (No Diff); Future  -     Hepatic Function Panel; Future  -      Protime-INR; Future  -     US Liver; Future    2. Class 1 obesity with serious comorbidity and body mass index (BMI) of 30.0 to 30.9 in adult, unspecified obesity type    3. Hemochromatosis carrier    4. Elevated ferritin              Follow Up   Return in about 5 months (around 4/6/2025).  R/w pt Rezdiffra for NAFLD - - written info given   Will r/w DR Melo regarding Rezdiffra   Liver US due now - labs in March   Cont low carb diet and weight loss, 2-4 c. Coffee/d     Patient was given instructions and counseling regarding her condition or for health maintenance advice. Please see specific information pulled into the AVS if appropriate.

## 2024-11-07 ENCOUNTER — PATIENT MESSAGE (OUTPATIENT)
Dept: GASTROENTEROLOGY | Facility: CLINIC | Age: 39
End: 2024-11-07
Payer: COMMERCIAL

## 2024-11-07 ENCOUNTER — TELEPHONE (OUTPATIENT)
Dept: GASTROENTEROLOGY | Facility: CLINIC | Age: 39
End: 2024-11-07
Payer: COMMERCIAL

## 2024-11-07 DIAGNOSIS — R19.7 DIARRHEA, UNSPECIFIED TYPE: Primary | ICD-10-CM

## 2024-11-07 LAB
027 TOXIN: NORMAL
C DIFF TOX GENS STL QL NAA+PROBE: NEGATIVE
HEMOCCULT STL QL IA: POSITIVE
LACTOFERRIN STL QL LA: POSITIVE

## 2024-11-07 PROCEDURE — 87506 IADNA-DNA/RNA PROBE TQ 6-11: CPT | Performed by: NURSE PRACTITIONER

## 2024-11-07 PROCEDURE — 87505 NFCT AGENT DETECTION GI: CPT | Performed by: NURSE PRACTITIONER

## 2024-11-07 PROCEDURE — 82274 ASSAY TEST FOR BLOOD FECAL: CPT | Performed by: NURSE PRACTITIONER

## 2024-11-07 PROCEDURE — 83630 LACTOFERRIN FECAL (QUAL): CPT | Performed by: NURSE PRACTITIONER

## 2024-11-07 PROCEDURE — 87493 C DIFF AMPLIFIED PROBE: CPT | Performed by: NURSE PRACTITIONER

## 2024-11-07 NOTE — TELEPHONE ENCOUNTER
Hub staff attempted to follow warm transfer process and was unsuccessful     Caller: Minh Drake    Relationship to patient: Self    Best call back number: [phone]    Patient is needing: PATIENT CALLED IN REGARDS TO CONCERNS SHE HAS REGARDING HER HAVING DIARRHEA AND DULL PAIN. PATIENT WOULD LIKE A CALL BACK AS SOON AS POSSIBLE. PLEASE CALL BACK ANYTIME, OKAY TO LEAVE .

## 2024-11-07 NOTE — TELEPHONE ENCOUNTER
"Patient called the office stating Stefanie said to let her know if her diarrhea got worse, and this morning it did. I asked patient what symptoms she was having and how is it worse this morning, patient said \"she just started having diarrhea with some abd discomfort in the center of her stomach. Patient also stated she does not have a fever, but has minor sweating when she has diarrhea. Please advise.  "

## 2024-11-08 LAB
CRYPTOSP DNA STL QL NAA+NON-PROBE: NOT DETECTED
E HISTOLYT DNA STL QL NAA+NON-PROBE: NOT DETECTED
G LAMBLIA DNA STL QL NAA+NON-PROBE: NOT DETECTED

## 2024-11-09 LAB
C COLI+JEJ+UPSA DNA STL QL NAA+NON-PROBE: NOT DETECTED
EC STX1+STX2 GENES STL QL NAA+NON-PROBE: NOT DETECTED
S ENT+BONG DNA STL QL NAA+NON-PROBE: DETECTED
SHIGELLA SP+EIEC IPAH ST NAA+NON-PROBE: NOT DETECTED

## 2024-11-11 ENCOUNTER — TELEPHONE (OUTPATIENT)
Dept: GASTROENTEROLOGY | Facility: CLINIC | Age: 39
End: 2024-11-11
Payer: COMMERCIAL

## 2024-11-11 NOTE — TELEPHONE ENCOUNTER
Attempted to call patient to schedule a follow up with Stefanie GOODE due to DIARRHEA AND DULL PAIN. [facility]

## 2024-11-11 NOTE — TELEPHONE ENCOUNTER
----- Message from Stefanie Cueva sent at 11/11/2024 10:03 AM EST -----  Please notify patient stool culture resulted positive for Salmonella, hydration is very important, caffeinated drinks do not count, encourage sports drinks and electrolyte replacement drinks.  Please see if patient is having any fever, vomiting, abdominal pain, or bloody stool?  Most patients in her age group will improve spontaneously. Diarrhea normally resolves within 4-10 days. We can provide pt a work note if needed.    Please let me know how patient is doing and we will go from there

## 2024-11-13 ENCOUNTER — HOSPITAL ENCOUNTER (OUTPATIENT)
Dept: ULTRASOUND IMAGING | Facility: HOSPITAL | Age: 39
Discharge: HOME | End: 2024-11-13
Admitting: NURSE PRACTITIONER
Payer: COMMERCIAL

## 2024-11-13 DIAGNOSIS — K76.0 FATTY LIVER: ICD-10-CM

## 2024-11-13 PROCEDURE — 76705 ECHO EXAM OF ABDOMEN: CPT

## 2024-11-14 NOTE — PROGRESS NOTES
+Fatty liver, please let pt know I r/w Dr Melo regarding starting Rezdiffra and he recommended to wait and repeat Fibroscan in 6 mo

## 2024-11-18 RX ORDER — DULOXETIN HYDROCHLORIDE 30 MG/1
30 CAPSULE, DELAYED RELEASE ORAL DAILY
Qty: 90 CAPSULE | Refills: 0 | Status: SHIPPED | OUTPATIENT
Start: 2024-11-18 | End: 2024-11-22 | Stop reason: SDUPTHER

## 2024-11-20 DIAGNOSIS — R79.89 ELEVATED FERRITIN: Primary | ICD-10-CM

## 2024-11-20 DIAGNOSIS — Z14.8 HEMOCHROMATOSIS CARRIER: ICD-10-CM

## 2024-11-21 ENCOUNTER — HOSPITAL ENCOUNTER (OUTPATIENT)
Dept: MRI IMAGING | Facility: HOSPITAL | Age: 39
Discharge: HOME | End: 2024-11-21
Admitting: NURSE PRACTITIONER
Payer: COMMERCIAL

## 2024-11-21 DIAGNOSIS — Z91.89 AT HIGH RISK FOR BREAST CANCER: ICD-10-CM

## 2024-11-21 PROCEDURE — A9577 INJ MULTIHANCE: HCPCS | Performed by: NURSE PRACTITIONER

## 2024-11-21 PROCEDURE — 25510000002 GADOBENATE DIMEGLUMINE 529 MG/ML SOLUTION: Performed by: NURSE PRACTITIONER

## 2024-11-21 PROCEDURE — 77049 MRI BREAST C-+ W/CAD BI: CPT

## 2024-11-21 RX ADMIN — GADOBENATE DIMEGLUMINE 18 ML: 529 INJECTION, SOLUTION INTRAVENOUS at 14:48

## 2024-11-22 ENCOUNTER — OFFICE VISIT (OUTPATIENT)
Dept: INTERNAL MEDICINE | Facility: CLINIC | Age: 39
End: 2024-11-22
Payer: COMMERCIAL

## 2024-11-22 ENCOUNTER — TELEPHONE (OUTPATIENT)
Dept: ONCOLOGY | Facility: HOSPITAL | Age: 39
End: 2024-11-22
Payer: COMMERCIAL

## 2024-11-22 VITALS
HEART RATE: 85 BPM | WEIGHT: 182 LBS | DIASTOLIC BLOOD PRESSURE: 74 MMHG | RESPIRATION RATE: 18 BRPM | BODY MASS INDEX: 31.07 KG/M2 | SYSTOLIC BLOOD PRESSURE: 122 MMHG | TEMPERATURE: 98 F | OXYGEN SATURATION: 98 % | HEIGHT: 64 IN

## 2024-11-22 DIAGNOSIS — M79.10 MYALGIA: ICD-10-CM

## 2024-11-22 DIAGNOSIS — R79.89 ELEVATED FERRITIN: ICD-10-CM

## 2024-11-22 DIAGNOSIS — R73.09 ELEVATED GLUCOSE: ICD-10-CM

## 2024-11-22 DIAGNOSIS — Z14.8 HEMOCHROMATOSIS CARRIER: ICD-10-CM

## 2024-11-22 DIAGNOSIS — F41.1 GAD (GENERALIZED ANXIETY DISORDER): ICD-10-CM

## 2024-11-22 DIAGNOSIS — Z00.00 ANNUAL PHYSICAL EXAM: ICD-10-CM

## 2024-11-22 DIAGNOSIS — E83.110 HEMOCHROMATOSIS ASSOCIATED WITH MUTATION IN HFE GENE: Primary | ICD-10-CM

## 2024-11-22 DIAGNOSIS — F33.0 MAJOR DEPRESSIVE DISORDER, RECURRENT, MILD: ICD-10-CM

## 2024-11-22 DIAGNOSIS — J30.9 ALLERGIC RHINITIS, UNSPECIFIED SEASONALITY, UNSPECIFIED TRIGGER: ICD-10-CM

## 2024-11-22 LAB
ALBUMIN SERPL-MCNC: 4.4 G/DL (ref 3.5–5.2)
ALBUMIN/GLOB SERPL: 1.2 G/DL
ALP SERPL-CCNC: 61 U/L (ref 39–117)
ALT SERPL W P-5'-P-CCNC: 55 U/L (ref 1–33)
ANION GAP SERPL CALCULATED.3IONS-SCNC: 13.1 MMOL/L (ref 5–15)
AST SERPL-CCNC: 96 U/L (ref 1–32)
BASOPHILS # BLD AUTO: 0.12 10*3/MM3 (ref 0–0.2)
BASOPHILS NFR BLD AUTO: 1 % (ref 0–1.5)
BILIRUB SERPL-MCNC: 0.2 MG/DL (ref 0–1.2)
BUN SERPL-MCNC: 7 MG/DL (ref 6–20)
BUN/CREAT SERPL: 12.3 (ref 7–25)
CALCIUM SPEC-SCNC: 9.3 MG/DL (ref 8.6–10.5)
CHLORIDE SERPL-SCNC: 103 MMOL/L (ref 98–107)
CO2 SERPL-SCNC: 21.9 MMOL/L (ref 22–29)
CREAT SERPL-MCNC: 0.57 MG/DL (ref 0.57–1)
DEPRECATED RDW RBC AUTO: 40.1 FL (ref 37–54)
EGFRCR SERPLBLD CKD-EPI 2021: 119.5 ML/MIN/1.73
EOSINOPHIL # BLD AUTO: 0.52 10*3/MM3 (ref 0–0.4)
EOSINOPHIL NFR BLD AUTO: 4.3 % (ref 0.3–6.2)
ERYTHROCYTE [DISTWIDTH] IN BLOOD BY AUTOMATED COUNT: 12 % (ref 12.3–15.4)
FERRITIN SERPL-MCNC: 800.8 NG/ML (ref 13–150)
FOLATE SERPL-MCNC: 10.2 NG/ML (ref 4.78–24.2)
GLOBULIN UR ELPH-MCNC: 3.6 GM/DL
GLUCOSE SERPL-MCNC: 85 MG/DL (ref 65–99)
HBA1C MFR BLD: 5.6 % (ref 4.8–5.6)
HCT VFR BLD AUTO: 41.6 % (ref 34–46.6)
HGB BLD-MCNC: 13.6 G/DL (ref 12–15.9)
IMM GRANULOCYTES # BLD AUTO: 0.03 10*3/MM3 (ref 0–0.05)
IMM GRANULOCYTES NFR BLD AUTO: 0.3 % (ref 0–0.5)
IRON 24H UR-MRATE: 110 MCG/DL (ref 37–145)
IRON SATN MFR SERPL: 21 % (ref 20–50)
LYMPHOCYTES # BLD AUTO: 4.17 10*3/MM3 (ref 0.7–3.1)
LYMPHOCYTES NFR BLD AUTO: 34.9 % (ref 19.6–45.3)
MCH RBC QN AUTO: 30.1 PG (ref 26.6–33)
MCHC RBC AUTO-ENTMCNC: 32.7 G/DL (ref 31.5–35.7)
MCV RBC AUTO: 92 FL (ref 79–97)
MONOCYTES # BLD AUTO: 0.69 10*3/MM3 (ref 0.1–0.9)
MONOCYTES NFR BLD AUTO: 5.8 % (ref 5–12)
NEUTROPHILS NFR BLD AUTO: 53.7 % (ref 42.7–76)
NEUTROPHILS NFR BLD AUTO: 6.43 10*3/MM3 (ref 1.7–7)
NRBC BLD AUTO-RTO: 0 /100 WBC (ref 0–0.2)
PLATELET # BLD AUTO: 323 10*3/MM3 (ref 140–450)
PMV BLD AUTO: 9.7 FL (ref 6–12)
POTASSIUM SERPL-SCNC: 4.1 MMOL/L (ref 3.5–5.2)
PROT SERPL-MCNC: 8 G/DL (ref 6–8.5)
RBC # BLD AUTO: 4.52 10*6/MM3 (ref 3.77–5.28)
SODIUM SERPL-SCNC: 138 MMOL/L (ref 136–145)
TIBC SERPL-MCNC: 520 MCG/DL (ref 298–536)
TRANSFERRIN SERPL-MCNC: 349 MG/DL (ref 200–360)
TSH SERPL DL<=0.05 MIU/L-ACNC: 2.88 UIU/ML (ref 0.27–4.2)
VIT B12 BLD-MCNC: 991 PG/ML (ref 211–946)
WBC NRBC COR # BLD AUTO: 11.96 10*3/MM3 (ref 3.4–10.8)

## 2024-11-22 PROCEDURE — 82607 VITAMIN B-12: CPT | Performed by: NURSE PRACTITIONER

## 2024-11-22 PROCEDURE — 82728 ASSAY OF FERRITIN: CPT | Performed by: NURSE PRACTITIONER

## 2024-11-22 PROCEDURE — 83540 ASSAY OF IRON: CPT | Performed by: NURSE PRACTITIONER

## 2024-11-22 PROCEDURE — 84466 ASSAY OF TRANSFERRIN: CPT | Performed by: NURSE PRACTITIONER

## 2024-11-22 PROCEDURE — 83036 HEMOGLOBIN GLYCOSYLATED A1C: CPT | Performed by: NURSE PRACTITIONER

## 2024-11-22 PROCEDURE — 82746 ASSAY OF FOLIC ACID SERUM: CPT | Performed by: NURSE PRACTITIONER

## 2024-11-22 PROCEDURE — 80050 GENERAL HEALTH PANEL: CPT | Performed by: NURSE PRACTITIONER

## 2024-11-22 RX ORDER — METHOCARBAMOL 500 MG/1
500 TABLET, FILM COATED ORAL 4 TIMES DAILY
Qty: 120 TABLET | Refills: 2 | Status: SHIPPED | OUTPATIENT
Start: 2024-11-22

## 2024-11-22 RX ORDER — DULOXETIN HYDROCHLORIDE 30 MG/1
30 CAPSULE, DELAYED RELEASE ORAL DAILY
Qty: 90 CAPSULE | Refills: 1 | Status: SHIPPED | OUTPATIENT
Start: 2024-11-22

## 2024-11-22 NOTE — TELEPHONE ENCOUNTER
LEFT MESSAGE FOR PATIENT IN REGARDS TO MOVING APPOINTMENT TIME FOR FOLLOW UP, ASKED FOR PATIENT TO CALL OFFICE BACK FOR RESCHEDULING.  
LEFT MESSAGE FOR PATIENT IN REGARDS TO MOVING APPOINTMENTS. ASKED FOR PATIENT TO CALL OFFICE BACK.  
PATIENT CALLED WANTED TO SEE IF THERE WAS A LATER TIME ON 11-27-24  
no

## 2024-11-22 NOTE — PROGRESS NOTES
"Chief Complaint  Annual Exam and FMLA (Discuss FMLA paperwork )      Subjective      History of Present Illness  The patient is a 39-year-old female who presents today to discuss FMLA paperwork and for annual physical.    She is seeking [facility] coverage for potential time off due to procedures or appointments related to hemochromatosis. She reports experiencing severe fatigue and pain, which has necessitated her absence from work. She estimates that her symptoms are severe enough to warrant calling in sick to work approximately 2 to 3 days before her next therapeutic phlebotomy. She has been scheduled for phlebotomy every three months, but the pain has recently intensified. She has been managing the pain with anti-inflammatory medication. She has been prescribed methocarbamol, which she takes two tablets of at night.     She is currently out of duloxetine and is requesting a refill. She has a sufficient supply of buspirone.     She has an upcoming appointment with her hematologist on the first Friday of December 2024, with lab work scheduled for the preceding Tuesday or Wednesday. She is considering requesting an earlier appointment. She has also been advised to follow a low iron diet.    She is experiencing discomfort in her left ear, which she describes as a feeling of pressure rather than pain. She has been taking Zyrtec but has not been taking Nasacort for this issue.    IMMUNIZATIONS  She had her COVID-19 vaccine in 04/2024.         Objective   Vital Signs:   Vitals:    11/22/24 1032   BP: 122/74   BP Location: Left arm   Patient Position: Sitting   Cuff Size: Adult   Pulse: 85   Resp: 18   Temp: 98 °F (36.7 °C)   TempSrc: Temporal   SpO2: 98%   Weight: 82.6 kg (182 lb)   Height: 162.6 cm (64\")     Body mass index is 31.24 kg/m².    Wt Readings from Last 3 Encounters:   11/22/24 82.6 kg (182 lb)   11/06/24 82.6 kg (182 lb)   10/03/24 82.6 kg (182 lb)     BP Readings from Last 3 Encounters:   11/22/24 122/74 "   11/06/24 136/97   10/03/24 114/68       Health Maintenance   Topic Date Due   • ANNUAL PHYSICAL  11/21/2024   • BMI FOLLOWUP  08/12/2025   • LIPID PANEL  10/03/2025   • PAP SMEAR  12/07/2025   • TDAP/TD VACCINES (2 - Td or Tdap) 02/05/2026   • COLORECTAL CANCER SCREENING  12/12/2031   • HEPATITIS C SCREENING  Completed   • COVID-19 Vaccine  Completed   • Pneumococcal Vaccine 0-64  Completed   • INFLUENZA VACCINE  Completed       Physical Exam  Vitals and nursing note reviewed.   Constitutional:       General: She is not in acute distress.     Appearance: Normal appearance.   HENT:      Head: Normocephalic and atraumatic.      Right Ear: Tympanic membrane, ear canal and external ear normal.      Left Ear: Tympanic membrane, ear canal and external ear normal.      Ears:      Comments: Serous effusion to left     Nose: Nose normal.      Mouth/Throat:      Mouth: Mucous membranes are moist.   Eyes:      Conjunctiva/sclera: Conjunctivae normal.   Cardiovascular:      Rate and Rhythm: Normal rate and regular rhythm.      Pulses: Normal pulses.      Heart sounds: Normal heart sounds. No murmur heard.     No friction rub. No gallop.   Pulmonary:      Effort: Pulmonary effort is normal. No respiratory distress.      Breath sounds: No wheezing, rhonchi or rales.   Musculoskeletal:      Cervical back: Neck supple.      Right lower leg: No edema.      Left lower leg: No edema.   Skin:     General: Skin is warm and dry.   Neurological:      General: No focal deficit present.      Mental Status: She is alert and oriented to person, place, and time.   Psychiatric:         Mood and Affect: Mood normal.         Behavior: Behavior normal.      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 Hemochromatosis.  She experiences severe exhaustion and pain, particularly as the time for her next procedure approaches. Labs from hematology are pending, and she is scheduled to see her hematologist on the first Friday of December. Blood work will be done a few days before the appointment. Labs will be conducted today to check blood counts, folate, B12, ferritin, iron, iron profile, chemistry panel, A1c, and TSH. If the procedure is moved up, fresh lab work will be required. She is advised to follow a low iron diet.    2.  Myalgia  She reports severe pain that requires her to take anti-inflammatory medications. Methocarbamol dosage can be increased to four times a day if she tolerates it well without experiencing drowsiness. A prescription for methocarbamol will be sent to [facility] pharmacy.    3.  Serous effusion/allergic rhinitis.  She reports discomfort and pressure in her left ear. There is slight fluid accumulation behind her eardrum, but no signs of infection. She is advised to restart Nasacort and report if there is no improvement.    4.  Anxiety and depression.  A prescription for duloxetine 30 mg will be sent to her pharmacy with a 90-day supply and a refill. She has enough buspirone and does not need a refill at this time.    5. Health Maintenance.  She will receive her COVID-19 vaccine today. Her cholesterol levels have shown improvement, and she is not due for another cholesterol check until  next year.    Follow-up  Return in 3 months for follow up.    Patient or patient representative verbalized consent for the use of Ambient Listening during the visit with  KYUNG Thapa for chart documentation. 11/22/2024  12:39 EST    19 to 39: Counseling/Anticipatory Guidance Discussed: nutrition, family planning/contraception, physical activity, healthy weight, mental health, and immunizations      FOLLOW UP  Return in about 3 months (around 2/22/2025).  Patient was given instructions and counseling regarding her condition or for health maintenance advice. Please see specific information pulled into the AVS if appropriate.     KYUNG Thapa  11/22/24  12:40 EST    CURRENT & DISCONTINUED MEDICATIONS  Current Outpatient Medications   Medication Instructions   • busPIRone (BUSPAR) 5 mg, Oral, 3 Times Daily PRN   • cetirizine (ZYRTEC) 10 mg, Oral, Daily   • DULoxetine (CYMBALTA) 30 mg, Oral, Daily   • hydrOXYzine (ATARAX) 25 mg, Oral, Every Night at Bedtime   • Melatonin 10 MG tablet 1 tablet, Nightly PRN   • methocarbamol (ROBAXIN) 500 mg, Oral, 4 Times Daily, 1 in the morning and 2 at night   • naproxen (NAPROSYN) 500 mg, Oral, 2 Times Daily With Meals   • NuvaRing 0.12-0.015 MG/24HR vaginal ring 1 each, Vaginal, Every 28 Days, Insert vaginally and leave in place for 3 consecutive weeks, then remove for 1 week.   • triamcinolone (KENALOG) 0.1 % cream 1 Application, Topical, 2 Times Daily   • Triamcinolone Acetonide (Nasacort Allergy 24HR) 55 MCG/ACT nasal inhaler 2 sprays, Nasal, Daily   • vitamin B-12 (CYANOCOBALAMIN) 1,000 mcg, Daily   • vitamin D (ERGOCALCIFEROL) 50,000 Units, Oral, Weekly       Medications Discontinued During This Encounter   Medication Reason   • methocarbamol (ROBAXIN) 500 MG tablet Reorder   • DULoxetine (CYMBALTA) 30 MG capsule Reorder

## 2024-11-25 ENCOUNTER — TELEPHONE (OUTPATIENT)
Dept: ONCOLOGY | Facility: HOSPITAL | Age: 39
End: 2024-11-25

## 2024-11-25 NOTE — TELEPHONE ENCOUNTER
Caller: Minh Drake    Relationship to patient: Self    Best call back number: [phone]    Chief complaint: SCHEDULING     Type of visit: FOLLOW UP    Requested date: SAME DAY 11-27 REQUESTING TO MOVE LATER IN THE DAY IF NO AVLIABLE PLEASE LEAVE ORIGINAL TIME AT 8

## 2024-11-26 ENCOUNTER — TELEPHONE (OUTPATIENT)
Dept: INTERNAL MEDICINE | Facility: CLINIC | Age: 39
End: 2024-11-26
Payer: COMMERCIAL

## 2024-11-26 NOTE — TELEPHONE ENCOUNTER
Caller: RAHEEL CAMERON AT [facility]    Relationship: Other    Best call back number: [phone]     What form or medical record are you requesting: A LETTER OR JUST BASIC CORRESPONDENCE ANSWERING IN GREATER DETAIL NUMBER 3 ON PAGE 5 OF THE PATIENTS FMLA PAPER WORK THAT STATES THE PATIENT IS UNABLE TO COMPLETE COMPLEX COGNITIVE TASKS. RAHEEL STATES THEY WOULD LIKE TO KNOW WHAT KIND OF TASKS ARE INCLUDED IN THAT LIMITATION, AND IS SHE UNABLE TO COMPLETE THESE TASKS ALL OF THE TIME OR JUST DURING A FLAIR UP.    Who is requesting this form or medical record from you: JOEY    How would you like to receive the form or medical records (pick-up, mail, fax): FAX: [phone]    Timeframe paperwork needed: ASAP

## 2024-11-26 NOTE — TELEPHONE ENCOUNTER
Called and spoke with Aimee, Aimee stated she will send a list of job requirements for pt and we can adjust the restrictions as fit.

## 2024-11-27 ENCOUNTER — TELEPHONE (OUTPATIENT)
Dept: GASTROENTEROLOGY | Facility: CLINIC | Age: 39
End: 2024-11-27
Payer: COMMERCIAL

## 2024-11-27 ENCOUNTER — TELEPHONE (OUTPATIENT)
Dept: OBSTETRICS AND GYNECOLOGY | Facility: CLINIC | Age: 39
End: 2024-11-27
Payer: COMMERCIAL

## 2024-11-27 ENCOUNTER — OFFICE VISIT (OUTPATIENT)
Dept: ONCOLOGY | Facility: HOSPITAL | Age: 39
End: 2024-11-27
Payer: COMMERCIAL

## 2024-11-27 VITALS
BODY MASS INDEX: 31.24 KG/M2 | RESPIRATION RATE: 18 BRPM | DIASTOLIC BLOOD PRESSURE: 91 MMHG | TEMPERATURE: 97.1 F | SYSTOLIC BLOOD PRESSURE: 127 MMHG | HEART RATE: 77 BPM | OXYGEN SATURATION: 96 % | HEIGHT: 64 IN

## 2024-11-27 DIAGNOSIS — Z14.8 HEMOCHROMATOSIS CARRIER: ICD-10-CM

## 2024-11-27 DIAGNOSIS — K76.0 FATTY LIVER DISEASE, NONALCOHOLIC: ICD-10-CM

## 2024-11-27 DIAGNOSIS — R79.89 ELEVATED FERRITIN: Primary | ICD-10-CM

## 2024-11-27 PROCEDURE — 99214 OFFICE O/P EST MOD 30 MIN: CPT | Performed by: NURSE PRACTITIONER

## 2024-11-27 PROCEDURE — G0463 HOSPITAL OUTPT CLINIC VISIT: HCPCS | Performed by: NURSE PRACTITIONER

## 2024-11-27 NOTE — PROGRESS NOTES
Chief Complaint/Reason for Referral:  Follow-up ( Hemochromatosis carrier--3 MO FOLLOW UP)    Jackelyn Helton APRN Lucas, Jenna M, APRN    Records Obtained:  Records of the patients history including those obtained from  [facility] and patient information were reviewed and summarized in detail.    Subjective    History of Present Illness  Ms. Minh Drake is a very pleasant 39 year old  female who presents to hematology for new patient evalution for hemochromatosis.      PMH includes: chronic seasonal allergies, asthma, migraine, chronic pain, hemochromatosis carrier. Has Nuvaring she uses for birth control and does not take out to have a monthly cycle. Noted to have a + STEFANI in March of 2024. RF less than 10. Reports she is following with rheumatology in [place].      Hemochromatosis was diagnosed in 10/7/2022 with 1 copy of H63D heterozygous hemochromatosis. Ferritin levels have been in the 664 to 842 range September of 2022. Iron elevated at 146 range, previously  normal but trending up since around December of 2023. Iron sat 28%. Has never had any phlebotomies. MRI of abdomen in 10/14/2022 with significant fatty liver, with no focal liver abnormality. Liver is enlarged. Normal spleen size.  Liver US in May of 2024 showed fatty liver.     Treatment: 11/13/2024:  moderate fatty liver. No focal or suspicious hepatic lesions.   9/23/2024:  ferritin 785 9/9/2024, iron 146: Phlebotomy 486 ml   11/22/2024: ferritin 800, iron level 110, iron sat 21%: phlebotomy scheduled for 12/2/24.     11/27/2024: Interval follow up: Ms. Minh Drake presents with her children for follow up for elevated ferritin, 1 copy of heterozygous hemochromatosis.     Ferritin level has been in the 664 to 842 range since September of 2022. 9/20/2024: ferritin level of 785 and completed phlebotomy on 9/23/2024. She reports her symptoms improved after the phlebotomy. Reports severe muscle pain especially in the left left. She has been doing  massage as well. Has muscle pain in the neck area. She is following with GI. GI ordered abdominal US and did show moderate diffuse fatty liver.         Oncology/Hematology History    No history exists.       Review of Systems   Constitutional: Negative.    HENT: Negative.     Eyes: Negative.    Respiratory: Negative.     Cardiovascular: Negative.    Gastrointestinal:  Positive for nausea.   Endocrine:        Cold sweats    Genitourinary: Negative.    Musculoskeletal:         Arm and calve pain    Skin: Negative.    Allergic/Immunologic: Negative.    Neurological:  Positive for dizziness and headache.   Hematological: Negative.    Psychiatric/Behavioral: Negative.     All other systems reviewed and are negative.      Current Outpatient Medications on File Prior to Visit   Medication Sig Dispense Refill    busPIRone (BUSPAR) 5 MG tablet Take 1 tablet by mouth 3 (Three) Times a Day As Needed (anxiety). 90 tablet 0    cetirizine (zyrTEC) 10 MG tablet Take 1 tablet by mouth Daily. 90 tablet 1    DULoxetine (CYMBALTA) 30 MG capsule Take 1 capsule by mouth Daily. 90 capsule 1    hydrOXYzine (ATARAX) 25 MG tablet Take 1 tablet by mouth every night at bedtime. 90 tablet 1    Melatonin 10 MG tablet Take 1 tablet by mouth At Night As Needed.      methocarbamol (ROBAXIN) 500 MG tablet Take 1 tablet by mouth 4 (Four) Times a Day. 1 in the morning and 2 at night 120 tablet 2    naproxen (Naprosyn) 500 MG tablet Take 1 tablet by mouth 2 (Two) Times a Day With Meals. (Patient taking differently: Take 1 tablet by mouth Daily.) 180 tablet 0    NuvaRing 0.12-0.015 MG/24HR vaginal ring Insert 1 each into the vagina Every 28 (Twenty-Eight) Days. Insert vaginally and leave in place for 3 consecutive weeks, then remove for 1 week. 3 each 3    triamcinolone (KENALOG) 0.1 % cream Apply 1 Application topically to the appropriate area as directed 2 (Two) Times a Day. 15 g 1    Triamcinolone Acetonide (Nasacort Allergy 24HR) 55 MCG/ACT nasal  inhaler 2 sprays into the nostril(s) as directed by provider Daily. 16.5 g 2    vitamin B-12 (CYANOCOBALAMIN) 1000 MCG tablet Take 1 tablet by mouth Daily.      vitamin D (ERGOCALCIFEROL) 1.25 MG (99973 UT) capsule capsule Take 1 capsule by mouth 1 (One) Time Per Week. 13 capsule 1     No current facility-administered medications on file prior to visit.       Allergies   Allergen Reactions    Brompheniramine-Pseudoeph Rash    Cephalexin Rash     Past Medical History:   Diagnosis Date    Allergic rhinitis     Asthma     4th grade    Broken bones     Elementary    Hemochromatosis     Migraine headache     Sinus trouble     Strep throat 07/06/2024     Past Surgical History:   Procedure Laterality Date    COLONOSCOPY N/A 12/12/2023    Procedure: COLONOSCOPY WITH BIOPSIES;  Surgeon: Tee Melo MD;  Location: [facility] ENDOSCOPY;  Service: Gastroenterology;  Laterality: N/A;  NORMAL    WISDOM TOOTH EXTRACTION       Social History     Socioeconomic History    Marital status:     Number of children: 2   Tobacco Use    Smoking status: Never    Smokeless tobacco: Never   Vaping Use    Vaping status: Never Used   Substance and Sexual Activity    Alcohol use: Never    Drug use: Never    Sexual activity: Yes     Partners: Male     Birth control/protection: Vaginal contraceptive ring     Family History   Problem Relation Age of Onset    Prostate cancer Father     Other Mother         Cerebrovascular Accident    Breast cancer Sister 44    Colon cancer Neg Hx     Ovarian cancer Neg Hx     Uterine cancer Neg Hx      Immunization History   Administered Date(s) Administered    COVID-19 (PFIZER) 12YRS+ (COMIRNATY) 04/11/2024, 11/22/2024    COVID-19 (PFIZER) BIVALENT 12+YRS 01/03/2023    COVID-19 (PFIZER) Purple Cap Monovalent 04/15/2021, 05/06/2021, 01/10/2022    Flu Vaccine Quad PF >36MO 10/05/2020    Fluzone  >6mos 09/23/2016, 09/19/2024    Fluzone (or Fluarix & Flulaval for VFC) >6mos 10/05/2020, 09/16/2022, 09/16/2023  "   Influenza TIV (IM) 10/08/2021    Influenza, Unspecified 10/08/2021    Pneumococcal Conjugate 20-Valent (PCV20) 09/19/2024    Tdap 02/05/2016       Tobacco Use: Low Risk  (11/27/2024)    Patient History     Smoking Tobacco Use: Never     Smokeless Tobacco Use: Never     Passive Exposure: Not on file       Objective     Physical Exam  Vitals and nursing note reviewed.   Constitutional:       Appearance: Normal appearance.   HENT:      Head: Normocephalic.      Nose: Nose normal.      Mouth/Throat:      Mouth: Mucous membranes are moist.   Eyes:      Pupils: Pupils are equal, round, and reactive to light.   Cardiovascular:      Rate and Rhythm: Normal rate and regular rhythm.      Pulses: Normal pulses.      Heart sounds: Normal heart sounds.   Pulmonary:      Effort: Pulmonary effort is normal. No respiratory distress.      Breath sounds: Normal breath sounds.   Abdominal:      Palpations: Abdomen is soft.   Musculoskeletal:         General: Normal range of motion.      Cervical back: Normal range of motion and neck supple.   Skin:     General: Skin is warm and dry.      Capillary Refill: Capillary refill takes less than 2 seconds.   Neurological:      General: No focal deficit present.      Mental Status: She is alert and oriented to person, place, and time.   Psychiatric:         Mood and Affect: Mood normal.         Behavior: Behavior normal.         Thought Content: Thought content normal.         Judgment: Judgment normal.         Vitals:    11/27/24 1430   BP: 127/91   Pulse: 77   Resp: 18   Temp: 97.1 °F (36.2 °C)   TempSrc: Tympanic   SpO2: 96%   Height: 162.6 cm (64\")   PainSc:   8   PainLoc: Leg  Comment: calves and arms       Wt Readings from Last 3 Encounters:   11/22/24 82.6 kg (182 lb)   11/06/24 82.6 kg (182 lb)   10/03/24 82.6 kg (182 lb)       ECOG score: 0         ECOG: (0) Fully Active - Able to Carry On All Pre-disease Performance Without Restriction  Fall Risk Assessment was completed, and " patient is at low risk for falls.  PHQ-9 Total Score:         The patient is  experiencing fatigue. Fatigue score: 10    PT/OT Functional Screening: PT fx screen : No needs identified  Speech Functional Screening: Speech fx screen : No needs identified  Rehab to be ordered: Rehab to be ordered : No needs identified        Result Review :  The following data was reviewed by: KYUNG Jasso on 11/27/2024:  Lab Results   Component Value Date    HGB 13.6 11/22/2024    HCT 41.6 11/22/2024    MCV 92.0 11/22/2024     11/22/2024    WBC 11.96 (H) 11/22/2024    NEUTROABS 6.43 11/22/2024    LYMPHSABS 4.17 (H) 11/22/2024    MONOSABS 0.69 11/22/2024    EOSABS 0.52 (H) 11/22/2024    BASOSABS 0.12 11/22/2024     Lab Results   Component Value Date    GLUCOSE 85 11/22/2024    BUN 7 11/22/2024    CREATININE 0.57 11/22/2024     11/22/2024    K 4.1 11/22/2024     11/22/2024    CO2 21.9 (L) 11/22/2024    CALCIUM 9.3 11/22/2024    PROTEINTOT 8.0 11/22/2024    ALBUMIN 4.4 11/22/2024    BILITOT 0.2 11/22/2024    ALKPHOS 61 11/22/2024    AST 96 (H) 11/22/2024    ALT 55 (H) 11/22/2024     Lab Results   Component Value Date    Ferritin 800.80 (H) 11/22/2024    Folate 10.20 11/22/2024     Lab Results   Component Value Date    IRON 110 11/22/2024    LABIRON 21 11/22/2024    TRANSFERRIN 349 11/22/2024    TIBC 520 11/22/2024     Lab Results   Component Value Date    FERRITIN 800.80 (H) 11/22/2024    OAJOHNZC25 991 (H) 11/22/2024    FOLATE 10.20 11/22/2024     Lab Results   Component Value Date    AFP 2.86 09/15/2022       MRI Breast Bilateral Diagnostic W WO Contrast    Result Date: 11/21/2024  Masses/non-mass enhancement are stable compared to previous MRIs dating back to 3/23/2022 and are therefore considered benign. No MRI signs of malignancy in either breast.  If she has a greater than 20% lifetime risk of breast cancer, recommend continued annual screening mammography (for which she will be due on/after  12/22/2024) and annual screening breast MRI.   BIRADS ASSESSMENT: Category 2: Benign      Electronically Signed By-Shanique Naidu MD On:11/21/2024 4:20 PM      US Liver    Result Date: 11/14/2024  Moderate diffuse hepatic steatosis. No acute findings in the right upper quadrant otherwise. Electronically Signed: Juan Horton MD  11/14/2024 12:24 PM EST  Workstation ID: [id number]           Assessment and Plan:  Diagnoses and all orders for this visit:    1. Elevated ferritin (Primary)  -     CBC & Differential; Future  -     Iron Profile; Future  -     Ferritin; Future    2. Hemochromatosis carrier  -     CBC & Differential; Future  -     Iron Profile; Future  -     Ferritin; Future    3. Fatty liver disease, nonalcoholic  -     CBC & Differential; Future  -     Iron Profile; Future  -     Ferritin; Future    Elevated ferritin in the range of 664 to 800. Iron level 110 to 146. Found to have 1 copy of Heterozygous H63D mutation. Given phlebotomy x 1 in September. Abdominal US shows diffuse moderate fatty liver. She is following with GI. Could consider MRI of the liver to rule out iron deposition.     Discussed the elevated ferritin can be elevated for other reasons beyond hemochromatosis including fatty liver, cirrhosis, acute inflammation, alcohol use and can be acute phase reactant. Generally, 1 copy of H63D mutations should not cause symptoms of iron overload and the elevated ferritin is likely related to fatty liver, elevated liver enzymes and triglyceridemia. She has elevated liver enzymes as well. Reviewed GI note. Considering Rezdiffra for the NAFLD. Was instructed to continue low carb diet, weight loss and black coffee. We discussed increase in physical therapy as well.     I discussed blood donation at the [place] as well.     We discussed adequate hydration, avoid oral iron products which she is not taking. Avoid excessive red meat. Avoid alcohol as well.     Will do one additional phlebotomy and recheck  iron panel, ferritin, CBC in early January.     Follow up with GI as scheduled.         I spent 30 minutes caring for Minh on this date of service. This time includes time spent by me in the following activities:preparing for the visit, reviewing tests, obtaining and/or reviewing a separately obtained history, performing a medically appropriate examination and/or evaluation , counseling and educating the patient/family/caregiver, ordering medications, tests, or procedures, referring and communicating with other health care professionals , documenting information in the medical record, and independently interpreting results and communicating that information with the patient/family/caregiver    Patient Follow Up: 6 weeks with NP>       Patient was given instructions and counseling regarding her condition or for health maintenance advice. Please see specific information pulled into the AVS if appropriate.     Neelam Thomas, APRN    11/27/2024    .tob

## 2024-11-27 NOTE — TELEPHONE ENCOUNTER
Name: NoelleMinh    Relationship: Self    Best Callback Number: [phone] / [facility]    HUB PROVIDED THE RELAY MESSAGE FROM THE OFFICE   PATIENT HAS FURTHER QUESTIONS AND WOULD LIKE A CALL BACK AT THE FOLLOWING PHONE NUMBER [phone]    ADDITIONAL INFORMATION: PT IS WANTING TO KNOW IF THE RESULTS CAN BE UPDATED AND SHOW IN HER MYCHART SO SHE WILL HAVE RECORD    PLEASE LET THE PT KNOW

## 2024-11-27 NOTE — TELEPHONE ENCOUNTER
Left a message for the patient to discuss her results. Her MRI was benign and it is recommended that she continue with screening mammograms and MRI's. HUB TO RELAY

## 2024-11-27 NOTE — TELEPHONE ENCOUNTER
Pt called and left VM stating she saw hematology today and was advised to see if an MRI could be ordered. Please review hematology notes.

## 2024-11-27 NOTE — TELEPHONE ENCOUNTER
----- Message from Ivis Jain sent at 11/22/2024 11:36 AM EST -----  Masses/non-mass enhancement are stable compared to previous MRIs dating  back to 3/23/2022 and are therefore considered benign. No MRI signs of  malignancy in either breast.     If she has a greater than 20% lifetime risk of breast cancer, recommend  continued annual screening mammography (for which she will be due  on/after 12/22/2024) and annual screening breast MRI.

## 2024-12-02 ENCOUNTER — HOSPITAL ENCOUNTER (OUTPATIENT)
Dept: ONCOLOGY | Facility: HOSPITAL | Age: 39
Discharge: HOME | End: 2024-12-02
Admitting: NURSE PRACTITIONER
Payer: COMMERCIAL

## 2024-12-02 VITALS
OXYGEN SATURATION: 96 % | HEART RATE: 89 BPM | TEMPERATURE: 98.1 F | SYSTOLIC BLOOD PRESSURE: 122 MMHG | DIASTOLIC BLOOD PRESSURE: 90 MMHG | RESPIRATION RATE: 18 BRPM

## 2024-12-02 DIAGNOSIS — K76.0 FATTY LIVER DISEASE, NONALCOHOLIC: Primary | ICD-10-CM

## 2024-12-02 DIAGNOSIS — E83.110 HEMOCHROMATOSIS ASSOCIATED WITH MUTATION IN HFE GENE: ICD-10-CM

## 2024-12-02 DIAGNOSIS — R79.89 ELEVATED FERRITIN: ICD-10-CM

## 2024-12-02 PROCEDURE — 99195 PHLEBOTOMY: CPT

## 2024-12-05 NOTE — TELEPHONE ENCOUNTER
I reached out to heme, no MRI has been ordered/ recommended at this time, states it was discussed, but not yet ordered. We can revisit this at follow up.   Thank you,  KYUNG Whipple

## 2024-12-08 PROCEDURE — 87581 M.PNEUMON DNA AMP PROBE: CPT | Performed by: FAMILY MEDICINE

## 2024-12-12 NOTE — PROGRESS NOTES
Well Woman Visit    CC: Scheduled annual well gyn visit  Chief Complaint   Patient presents with    Annual Exam         Contraception:  NuvaRing    Last Completed Pap Smear            PAP SMEAR (Every 3 Years) Next due on 2022  IgP, Aptima HPV    2021  IGP,rfx Aptima HPV All Pth    10/27/2021  Done - normal                   Last Completed Mammogram       This patient has no relevant Health Maintenance data.             HPI:   39 y.o.     History of Present Illness  The patient presents for an annual exam.    She has been diagnosed with hemochromatosis, carrying only one gene. Initially, she was under the care of a rheumatologist before transitioning to a hematologist. The rheumatologist suggested a consultation with an OB-GYN regarding the removal of her NuvaRing. She experiences amenorrhea with the use of NuvaRing but reports manageable pain that does not necessitate medication. She is considering surgical options for permanent contraception.    She acknowledges being overdue for her mammogram and ultrasound examinations. She has recently undergone an MRI and is uncertain about the necessity of a mammogram, which is scheduled for the following month. Her sister was diagnosed with breast cancer at the age of 44. She believes her calcium marker was slightly elevated at 14.    Her Pap smear is current, with the next one due in . She is due for a Tdap vaccine, as her eldest child is now 10 years old.    FAMILY HISTORY  Her sister was diagnosed with breast cancer at 44.    MEDICATIONS  NuvaRing    IMMUNIZATIONS  She is due for a Tdap vaccine.     PCP: does manage PMHx and preventative labs  History: PMHx, Meds, Allergies, PSHx, Social Hx, and POBHx all reviewed and updated.    Pt has no complaints today.    PHYSICAL EXAM:  /81   Pulse 93   Wt 83.5 kg (184 lb)   LMP  (LMP Unknown) Comment: No menses with Nuvaring  Breastfeeding No   BMI 31.58 kg/m²  Not found.  General-  NAD, alert and oriented, appropriate  Psych- Normal mood, good memory  Neck- No masses, no thyroid enlargement  CV- Regular rhythm, no murnurs  Resp- CTA to bases, no wheezes  Abdomen- Soft, non distended, non tender, no masses  Breast left-  Bilaterally symmetrical, no masses, non tender, no nipple discharge  Breast right- Bilaterally symmetrical, no masses, non tender, no nipple discharge  External genitalia- Normal female, no lesions,   Urethra/meatus- Normal, no masses, non tender  Bladder- Normal, no masses, non tender  Vagina- Normal, no atrophy, no lesions, no discharge.    Cvx- Normal, no lesions, no discharge, No cervical motion tenderness  Uterus- Normal size, shape & consistency.  Non tender, mobile.  Adnexa- No mass, non tender  Anus/Rectum/Perineum- Not performed  Lymphatic- No palpable neck, axillary, or groin nodes  Ext- No edema, no cyanosis    Skin- No lesions, no rashes, no acanthosis nigricans      ASSESSMENT and PLAN:    Diagnoses and all orders for this visit:    1. Well woman exam with routine gynecological exam (Primary)    2. Increased risk of breast cancer  Overview:  Personal risk of breast cancer is 22.8%  Genetic testing is negative  Youngest affected family member diagnosed at 44    Orders:  -     Mammo Screening Digital Tomosynthesis Bilateral With CAD; Future    3. Encounter for screening mammogram for malignant neoplasm of breast  -     Mammo Screening Digital Tomosynthesis Bilateral With CAD; Future    4. Encounter for surveillance of vaginal ring hormonal contraceptive device  -     NuvaRing 0.12-0.015 MG/24HR vaginal ring; Insert 1 each into the vagina Every 28 (Twenty-Eight) Days. Insert vaginally and leave in place for 3 consecutive weeks, then remove for 1 week.  Dispense: 3 each; Refill: 3        Assessment & Plan  1. Annual examination.  Her Pap smear is up to date and will be done next year. She is due for a Tdap vaccine. She will receive a refill for her NuvaRing for 3  months. She is advised to get the Tdap vaccine from her primary care provider or a local pharmacy.    2. Hemochromatosis.  She reports being diagnosed with hemochromatosis and carries only one gene. She was initially seen by a rheumatologist and then by a hematologist. No specific treatment plan was discussed during this visit.    3. Increased risk of breast cancer.  Her sister was diagnosed with breast cancer at age 44, and her personal risk of breast cancer for her lifetime is 22.8%, qualifying her for annual MRIs. She is advised to continue with both MRI and mammogram screenings annually, with the possibility of spacing them 6 months apart. She will reschedule her mammogram to be 5 months after her recent MRI.    4. Contraception management.  She is currently using the NuvaRing and skips her periods with it. She finds the pain manageable and does not require additional medication. Various contraceptive options were discussed, including IUDs and surgical options for permanent sterilization. She expressed interest in the Mirena IUD, which was discussed in detail, including its effectiveness and potential side effects.       Preventative:  BREAST HEALTH- Monthly self breast exam importance and how to reviewed. MMG and/or MRI (prn) reviewed per society guidelines and her individual history. Screen: Pt will call to schedule  CERVICAL CANCER Screening- Reviewed current ASCCP guidelines for screening w and wo cotest HPV, age specific.  Screen: Already up to date  Follow up PCP/Specialist PMHx and/or Labs      She understands the importance of having any ordered tests to be performed in a timely fashion.  The risks of not performing them include, but are not limited to, advanced cancer stages, bone loss from osteoporosis and/or subsequent increase in morbidity and/or mortality.  She is encouraged to review her results online and/or contact or office if she has questions.     Follow Up:  Return in about 1 year (around  12/16/2025) for Annual physical.            Emily Escudero MD  12/16/2024    [facility]  [address] [place] [address]  Dept: [phone]  Dept Fax: [phone]  Loc: [phone]  Loc Fax: [phone]     Patient or patient representative verbalized consent for the use of Ambient Listening during the visit with  Emily Escudero MD for chart documentation. 12/16/2024  09:44 EST

## 2024-12-16 ENCOUNTER — PATIENT MESSAGE (OUTPATIENT)
Dept: INTERNAL MEDICINE | Facility: CLINIC | Age: 39
End: 2024-12-16
Payer: COMMERCIAL

## 2024-12-16 ENCOUNTER — OFFICE VISIT (OUTPATIENT)
Dept: OBSTETRICS AND GYNECOLOGY | Facility: CLINIC | Age: 39
End: 2024-12-16
Payer: COMMERCIAL

## 2024-12-16 VITALS
WEIGHT: 184 LBS | HEART RATE: 93 BPM | SYSTOLIC BLOOD PRESSURE: 116 MMHG | DIASTOLIC BLOOD PRESSURE: 81 MMHG | BODY MASS INDEX: 31.58 KG/M2

## 2024-12-16 DIAGNOSIS — Z12.31 ENCOUNTER FOR SCREENING MAMMOGRAM FOR MALIGNANT NEOPLASM OF BREAST: ICD-10-CM

## 2024-12-16 DIAGNOSIS — Z91.89 INCREASED RISK OF BREAST CANCER: ICD-10-CM

## 2024-12-16 DIAGNOSIS — Z01.419 WELL WOMAN EXAM WITH ROUTINE GYNECOLOGICAL EXAM: Primary | ICD-10-CM

## 2024-12-16 DIAGNOSIS — Z30.44 ENCOUNTER FOR SURVEILLANCE OF VAGINAL RING HORMONAL CONTRACEPTIVE DEVICE: ICD-10-CM

## 2024-12-16 DIAGNOSIS — Z23 NEED FOR TETANUS, DIPHTHERIA, AND ACELLULAR PERTUSSIS (TDAP) VACCINE: Primary | ICD-10-CM

## 2024-12-16 RX ORDER — ETONOGESTREL/ETHINYL ESTRADIOL .12-.015MG
1 RING, VAGINAL VAGINAL
Qty: 3 EACH | Refills: 3 | Status: SHIPPED | OUTPATIENT
Start: 2024-12-16 | End: 2025-12-16

## 2024-12-23 ENCOUNTER — CLINICAL SUPPORT (OUTPATIENT)
Dept: INTERNAL MEDICINE | Facility: CLINIC | Age: 39
End: 2024-12-23
Payer: COMMERCIAL

## 2024-12-23 DIAGNOSIS — Z23 ENCOUNTER FOR IMMUNIZATION: Primary | ICD-10-CM

## 2024-12-23 PROCEDURE — 90715 TDAP VACCINE 7 YRS/> IM: CPT | Performed by: NURSE PRACTITIONER

## 2024-12-23 PROCEDURE — 90471 IMMUNIZATION ADMIN: CPT | Performed by: NURSE PRACTITIONER

## 2024-12-23 RX ORDER — ERGOCALCIFEROL 1.25 MG/1
50000 CAPSULE, LIQUID FILLED ORAL WEEKLY
Qty: 13 CAPSULE | Refills: 1 | Status: SHIPPED | OUTPATIENT
Start: 2024-12-23

## 2024-12-30 RX ORDER — NAPROXEN 500 MG/1
500 TABLET ORAL 2 TIMES DAILY WITH MEALS
Qty: 60 TABLET | Refills: 2 | Status: SHIPPED | OUTPATIENT
Start: 2024-12-30

## 2025-01-08 ENCOUNTER — OFFICE VISIT (OUTPATIENT)
Dept: ONCOLOGY | Facility: HOSPITAL | Age: 40
End: 2025-01-08
Payer: COMMERCIAL

## 2025-01-08 ENCOUNTER — LAB (OUTPATIENT)
Dept: ONCOLOGY | Facility: HOSPITAL | Age: 40
End: 2025-01-08
Payer: COMMERCIAL

## 2025-01-08 VITALS
WEIGHT: 187.6 LBS | DIASTOLIC BLOOD PRESSURE: 94 MMHG | SYSTOLIC BLOOD PRESSURE: 129 MMHG | OXYGEN SATURATION: 98 % | BODY MASS INDEX: 32.03 KG/M2 | RESPIRATION RATE: 16 BRPM | HEIGHT: 64 IN | TEMPERATURE: 98.2 F | HEART RATE: 98 BPM

## 2025-01-08 DIAGNOSIS — R76.8 POSITIVE ANA (ANTINUCLEAR ANTIBODY): ICD-10-CM

## 2025-01-08 DIAGNOSIS — R79.89 ELEVATED FERRITIN: Primary | ICD-10-CM

## 2025-01-08 DIAGNOSIS — Z14.8 HEMOCHROMATOSIS CARRIER: ICD-10-CM

## 2025-01-08 DIAGNOSIS — K76.0 FATTY LIVER DISEASE, NONALCOHOLIC: ICD-10-CM

## 2025-01-08 DIAGNOSIS — R79.89 ELEVATED FERRITIN: ICD-10-CM

## 2025-01-08 DIAGNOSIS — E83.110 HEMOCHROMATOSIS ASSOCIATED WITH MUTATION IN HFE GENE: ICD-10-CM

## 2025-01-08 LAB
BASOPHILS # BLD AUTO: 0.13 10*3/MM3 (ref 0–0.2)
BASOPHILS NFR BLD AUTO: 1.1 % (ref 0–1.5)
DEPRECATED RDW RBC AUTO: 42.9 FL (ref 37–54)
EOSINOPHIL # BLD AUTO: 0.73 10*3/MM3 (ref 0–0.4)
EOSINOPHIL NFR BLD AUTO: 6.1 % (ref 0.3–6.2)
ERYTHROCYTE [DISTWIDTH] IN BLOOD BY AUTOMATED COUNT: 12.3 % (ref 12.3–15.4)
FERRITIN SERPL-MCNC: 462.5 NG/ML (ref 13–150)
HCT VFR BLD AUTO: 42.3 % (ref 34–46.6)
HGB BLD-MCNC: 13.2 G/DL (ref 12–15.9)
IMM GRANULOCYTES # BLD AUTO: 0.04 10*3/MM3 (ref 0–0.05)
IMM GRANULOCYTES NFR BLD AUTO: 0.3 % (ref 0–0.5)
IRON 24H UR-MRATE: 79 MCG/DL (ref 37–145)
IRON SATN MFR SERPL: 14 % (ref 20–50)
LYMPHOCYTES # BLD AUTO: 3.72 10*3/MM3 (ref 0.7–3.1)
LYMPHOCYTES NFR BLD AUTO: 31.1 % (ref 19.6–45.3)
MCH RBC QN AUTO: 29.4 PG (ref 26.6–33)
MCHC RBC AUTO-ENTMCNC: 31.2 G/DL (ref 31.5–35.7)
MCV RBC AUTO: 94.2 FL (ref 79–97)
MONOCYTES # BLD AUTO: 0.75 10*3/MM3 (ref 0.1–0.9)
MONOCYTES NFR BLD AUTO: 6.3 % (ref 5–12)
NEUTROPHILS NFR BLD AUTO: 55.1 % (ref 42.7–76)
NEUTROPHILS NFR BLD AUTO: 6.59 10*3/MM3 (ref 1.7–7)
NRBC BLD AUTO-RTO: 0 /100 WBC (ref 0–0.2)
PLATELET # BLD AUTO: 328 10*3/MM3 (ref 140–450)
PMV BLD AUTO: 9.6 FL (ref 6–12)
RBC # BLD AUTO: 4.49 10*6/MM3 (ref 3.77–5.28)
TIBC SERPL-MCNC: 548 MCG/DL (ref 298–536)
TRANSFERRIN SERPL-MCNC: 368 MG/DL (ref 200–360)
WBC NRBC COR # BLD AUTO: 11.96 10*3/MM3 (ref 3.4–10.8)

## 2025-01-08 PROCEDURE — G0463 HOSPITAL OUTPT CLINIC VISIT: HCPCS | Performed by: NURSE PRACTITIONER

## 2025-01-08 PROCEDURE — 84466 ASSAY OF TRANSFERRIN: CPT

## 2025-01-08 PROCEDURE — 36415 COLL VENOUS BLD VENIPUNCTURE: CPT

## 2025-01-08 PROCEDURE — 83540 ASSAY OF IRON: CPT

## 2025-01-08 PROCEDURE — 85025 COMPLETE CBC W/AUTO DIFF WBC: CPT

## 2025-01-08 PROCEDURE — 82728 ASSAY OF FERRITIN: CPT

## 2025-01-08 RX ORDER — TRIAMCINOLONE ACETONIDE 1 MG/G
OINTMENT TOPICAL
COMMUNITY
Start: 2025-01-07

## 2025-01-08 NOTE — PROGRESS NOTES
Chief Complaint/Reason for Referral:  Follow-up (Hemochromatosis carrier)    Jackelyn Helton, Jackelyn Valdes, KYUNG    Records Obtained:  Records of the patients history including those obtained from  [facility] and patient information were reviewed and summarized in detail.    Subjective    History of Present Illness  Ms. Minh Drake is a very pleasant 39 year old  female who presents to hematology for new patient evalution for hemochromatosis.      PMH includes: chronic seasonal allergies, asthma, migraine, chronic pain, hemochromatosis carrier. Has Nuvaring she uses for birth control and does not take out to have a monthly cycle. Noted to have a + STEFANI in March of 2024. RF less than 10. Reports she is following with rheumatology in [place].      Hemochromatosis was diagnosed in 10/7/2022 with 1 copy of H63D heterozygous hemochromatosis. Ferritin levels have been in the 664 to 842 range September of 2022. Iron elevated at 146 range, previously  normal but trending up since around December of 2023. Iron sat 28%. Has never had any phlebotomies. MRI of abdomen in 10/14/2022 with significant fatty liver, with no focal liver abnormality. Liver is enlarged. Normal spleen size.  Liver US in May of 2024 showed fatty liver.     Treatment: 11/13/2024:  moderate fatty liver. No focal or suspicious hepatic lesions.   9/23/2024:  ferritin 785 9/9/2024, iron 146: Phlebotomy 486 ml   11/22/2024: ferritin 800, iron level 110, iron sat 21%: phlebotomy scheduled for 12/2/24.   01/08/2025:     01/08//2024: Interval follow up: Ms. Minh Drake presents with her children for follow up for elevated ferritin, 1 copy of heterozygous hemochromatosis with 1 copy of H63D mutation. She has know moderate diffuse fatty liver seen on US liver from 11/14/2024. Lab work in 11/2024 showed ferritin in the 800, iron level normal at 110, iron sat 21%. Reports she was instructed by GI to try to drink 3-4 cups of coffee a day. Reports she is  taking Vitamin E supplementation as well. Has intermittent muscle pains and arthralgia's. She has fibroscan scheduled for 2/14/2025 and follow up with GI in April. Labs ordered for today, but did not get prior to appointment. We have discussed avoidance of alcohol. We discussed donating blood at the [facility]. Will call with labs or my chart message lab results after done. We discussed H63D mutation generally does not need intermittent phlebotomy.     Oncology/Hematology History    No history exists.       Review of Systems   Constitutional: Negative.    HENT: Negative.     Eyes: Negative.    Respiratory: Negative.     Cardiovascular: Negative.    Gastrointestinal:  Positive for nausea.   Endocrine:        Cold sweats    Genitourinary: Negative.  Positive for pelvic pain.   Musculoskeletal:         Arm and calve pain    Skin: Negative.    Allergic/Immunologic: Negative.    Neurological:  Positive for dizziness and headache.   Hematological: Negative.    Psychiatric/Behavioral: Negative.     All other systems reviewed and are negative.      Current Outpatient Medications on File Prior to Visit   Medication Sig Dispense Refill    busPIRone (BUSPAR) 5 MG tablet Take 1 tablet by mouth 3 (Three) Times a Day As Needed (anxiety). 90 tablet 0    cetirizine (zyrTEC) 10 MG tablet Take 1 tablet by mouth Daily. 90 tablet 1    DULoxetine (CYMBALTA) 30 MG capsule Take 1 capsule by mouth Daily. 90 capsule 1    hydrOXYzine (ATARAX) 25 MG tablet Take 1 tablet by mouth every night at bedtime. 90 tablet 1    Melatonin 10 MG tablet Take 1 tablet by mouth At Night As Needed.      methocarbamol (ROBAXIN) 500 MG tablet Take 1 tablet by mouth 4 (Four) Times a Day. 1 in the morning and 2 at night 120 tablet 2    naproxen (NAPROSYN) 500 MG tablet TAKE 1 TABLET BY MOUTH TWICE A DAY WITH MEALS 60 tablet 2    NuvaRing 0.12-0.015 MG/24HR vaginal ring Insert 1 each into the vagina Every 28 (Twenty-Eight) Days. Insert vaginally and leave  in place for 3 consecutive weeks, then remove for 1 week. 3 each 3    triamcinolone (KENALOG) 0.1 % ointment       Triamcinolone Acetonide (Nasacort Allergy 24HR) 55 MCG/ACT nasal inhaler 2 sprays into the nostril(s) as directed by provider Daily. 16.5 g 2    vitamin B-12 (CYANOCOBALAMIN) 1000 MCG tablet Take 1 tablet by mouth Daily.      vitamin D (ERGOCALCIFEROL) 1.25 MG (87803 UT) capsule capsule Take 1 capsule by mouth 1 (One) Time Per Week. 13 capsule 1    triamcinolone (KENALOG) 0.1 % cream Apply 1 Application topically to the appropriate area as directed 2 (Two) Times a Day. (Patient not taking: Reported on 1/8/2025) 15 g 1     No current facility-administered medications on file prior to visit.       Allergies   Allergen Reactions    Brompheniramine-Pseudoeph Rash    Cephalexin Rash     Past Medical History:   Diagnosis Date    Allergic rhinitis     Asthma     4th grade    Broken bones     Elementary    Hemochromatosis     Migraine headache     Sinus trouble     Strep throat 07/06/2024     Past Surgical History:   Procedure Laterality Date    COLONOSCOPY N/A 12/12/2023    Procedure: COLONOSCOPY WITH BIOPSIES;  Surgeon: Tee Melo MD;  Location: [facility] ENDOSCOPY;  Service: Gastroenterology;  Laterality: N/A;  NORMAL    WISDOM TOOTH EXTRACTION       Social History     Socioeconomic History    Marital status:     Number of children: 2   Tobacco Use    Smoking status: Never     Passive exposure: Never    Smokeless tobacco: Never   Vaping Use    Vaping status: Never Used   Substance and Sexual Activity    Alcohol use: Never    Drug use: Never    Sexual activity: Yes     Partners: Male     Birth control/protection: Vaginal contraceptive ring     Family History   Problem Relation Age of Onset    Prostate cancer Father     Cataracts Father     Breast cancer Sister 44    Colon cancer Neg Hx     Ovarian cancer Neg Hx     Uterine cancer Neg Hx      Immunization History   Administered Date(s)  "Administered    COVID-19 (PFIZER) 12YRS+ (COMIRNATY) 04/11/2024, 11/22/2024    COVID-19 (PFIZER) BIVALENT 12+YRS 01/03/2023    COVID-19 (PFIZER) Purple Cap Monovalent 04/15/2021, 05/06/2021, 01/10/2022    Fluzone  >6mos 09/23/2016, 09/19/2024    Fluzone (or Fluarix & Flulaval for VFC) >6mos 10/05/2020, 09/16/2022, 09/16/2023    Influenza TIV (IM) 10/08/2021    Pneumococcal Conjugate 20-Valent (PCV20) 09/19/2024    Tdap 02/05/2016, 12/23/2024       Tobacco Use: Low Risk  (1/8/2025)    Patient History     Smoking Tobacco Use: Never     Smokeless Tobacco Use: Never     Passive Exposure: Never       Objective     Physical Exam  Vitals and nursing note reviewed.   Constitutional:       Appearance: Normal appearance.   HENT:      Head: Normocephalic.      Nose: Nose normal.      Mouth/Throat:      Mouth: Mucous membranes are moist.   Eyes:      Pupils: Pupils are equal, round, and reactive to light.   Cardiovascular:      Rate and Rhythm: Normal rate and regular rhythm.      Pulses: Normal pulses.      Heart sounds: Normal heart sounds.   Pulmonary:      Effort: Pulmonary effort is normal. No respiratory distress.      Breath sounds: Normal breath sounds.   Abdominal:      Palpations: Abdomen is soft.   Musculoskeletal:         General: Normal range of motion.      Cervical back: Normal range of motion and neck supple.   Skin:     General: Skin is warm and dry.      Capillary Refill: Capillary refill takes less than 2 seconds.   Neurological:      General: No focal deficit present.      Mental Status: She is alert and oriented to person, place, and time.   Psychiatric:         Mood and Affect: Mood normal.         Behavior: Behavior normal.         Thought Content: Thought content normal.         Judgment: Judgment normal.         Vitals:    01/08/25 0805   BP: 129/94   Pulse: 98   Resp: 16   Temp: 98.2 °F (36.8 °C)   TempSrc: Temporal   SpO2: 98%   Weight: 85.1 kg (187 lb 9.6 oz)   Height: 162.6 cm (64\")   PainSc:   5 "   PainLoc: Groin       Wt Readings from Last 3 Encounters:   01/08/25 85.1 kg (187 lb 9.6 oz)   12/16/24 83.5 kg (184 lb)   12/08/24 82.6 kg (182 lb)       ECOG score: 0         ECOG: (0) Fully Active - Able to Carry On All Pre-disease Performance Without Restriction  Fall Risk Assessment was completed, and patient is at low risk for falls.  PHQ-9 Total Score:         The patient is  experiencing fatigue. Fatigue score: 10    PT/OT Functional Screening: PT fx screen : No needs identified  Speech Functional Screening: Speech fx screen : No needs identified  Rehab to be ordered: Rehab to be ordered : No needs identified        Result Review :  The following data was reviewed by: KYUNG Jasso on 11/27/2024:  Lab Results   Component Value Date    HGB 13.2 01/08/2025    HCT 42.3 01/08/2025    MCV 94.2 01/08/2025     01/08/2025    WBC 11.96 (H) 01/08/2025    NEUTROABS 6.59 01/08/2025    LYMPHSABS 3.72 (H) 01/08/2025    MONOSABS 0.75 01/08/2025    EOSABS 0.73 (H) 01/08/2025    BASOSABS 0.13 01/08/2025     Lab Results   Component Value Date    GLUCOSE 85 11/22/2024    BUN 7 11/22/2024    CREATININE 0.57 11/22/2024     11/22/2024    K 4.1 11/22/2024     11/22/2024    CO2 21.9 (L) 11/22/2024    CALCIUM 9.3 11/22/2024    PROTEINTOT 8.0 11/22/2024    ALBUMIN 4.4 11/22/2024    BILITOT 0.2 11/22/2024    ALKPHOS 61 11/22/2024    AST 96 (H) 11/22/2024    ALT 55 (H) 11/22/2024     Lab Results   Component Value Date    Ferritin 800.80 (H) 11/22/2024    Folate 10.20 11/22/2024     Lab Results   Component Value Date    IRON 110 11/22/2024    LABIRON 21 11/22/2024    TRANSFERRIN 349 11/22/2024    TIBC 520 11/22/2024     Lab Results   Component Value Date    FERRITIN 800.80 (H) 11/22/2024    BLVOIGSC30 991 (H) 11/22/2024    FOLATE 10.20 11/22/2024     Lab Results   Component Value Date    AFP 2.86 09/15/2022       MRI Breast Bilateral Diagnostic W WO Contrast    Result Date: 11/21/2024  Masses/non-mass  enhancement are stable compared to previous MRIs dating back to 3/23/2022 and are therefore considered benign. No MRI signs of malignancy in either breast.  If she has a greater than 20% lifetime risk of breast cancer, recommend continued annual screening mammography (for which she will be due on/after 12/22/2024) and annual screening breast MRI.   BIRADS ASSESSMENT: Category 2: Benign      Electronically Signed By-Shanique Naidu MD On:11/21/2024 4:20 PM      US Liver    Result Date: 11/14/2024  Moderate diffuse hepatic steatosis. No acute findings in the right upper quadrant otherwise. Electronically Signed: Juan Horton MD  11/14/2024 12:24 PM EST  Workstation ID: [id number]           Assessment and Plan:  Diagnoses and all orders for this visit:    1. Elevated ferritin (Primary)  -     Cancel: Ferritin  -     Cancel: Iron Profile  -     Cancel: CBC & Differential  -     CBC & Differential; Future  -     Iron Profile; Future  -     Ferritin; Future    2. Fatty liver disease, nonalcoholic  -     Cancel: Ferritin  -     Cancel: Iron Profile  -     Cancel: CBC & Differential  -     CBC & Differential; Future  -     Iron Profile; Future  -     Ferritin; Future    3. 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 Positive STEFANI (antinuclear antibody)      Elevated ferritin in the range of 664 to 800. Iron level 110 to 146. Found to have 1 copy of Heterozygous H63D mutation. Given phlebotomy x 1 in September 2024. Abdominal US shows diffuse moderate fatty liver. She is following with GI. Could consider MRI of the liver to rule out iron deposition. Did a phlebotomy on 12/2/2024 of 500 ml. Ferritin on 01/08/2025 lab work with ferritin down to 462 from 800. Iron sat 14%. Hemoglobin normal range.     Leukocytosis: WBC mildly elevated at 11.96.  has had intermittent elevated absolute lymphocytosis. Will continue to trend.     Discussed the elevated ferritin can be elevated for other reasons beyond hemochromatosis including fatty liver, cirrhosis, acute inflammation, alcohol use and can be acute phase reactant. Generally, 1 copy of H63D mutations should not cause symptoms of iron overload and the elevated ferritin is likely related to fatty liver, elevated liver enzymes and triglyceridemia. She has elevated liver enzymes as well. Reviewed GI note. Considering Rezdiffra for the NAFLD. Was instructed to continue low carb diet, weight loss and black coffee. We discussed increase in physical therapy as well.     I discussed blood donation at the [facility] as well.     We discussed adequate hydration, avoid oral iron products which she is not taking. Avoid excessive red meat. Avoid alcohol as well.     Labs will be done after visit today. Will call or my chart message to discuss ferrtin level.     Follow up with GI as scheduled.     Patient is following with rheumatology for elevated STEFANI.         I spent 30 minutes caring for Minh on this date of service. This time includes time spent by me in the following activities:preparing for the visit, reviewing tests, obtaining and/or reviewing a separately obtained history, performing a medically appropriate examination and/or evaluation , counseling and educating the patient/family/caregiver, ordering medications, tests, or procedures, referring and communicating with other health care professionals , documenting information in the medical record, and independently interpreting results and communicating that information with the patient/family/caregiver    Patient Follow Up: 6 weeks with NP>       Patient was given instructions and counseling regarding her condition or for health maintenance advice. Please see specific information pulled into the AVS if appropriate.     Neelam Thomas,  APRN    1/8/2025    .tob

## 2025-01-08 NOTE — PROGRESS NOTES
Ferritin is improved to 462 on today lab work.     Iron sat 14%. No anemia noted.     Follow up with GI as scheduled.     Follow up in 4 months with labs with NP.

## 2025-01-14 ENCOUNTER — TELEPHONE (OUTPATIENT)
Dept: ONCOLOGY | Facility: HOSPITAL | Age: 40
End: 2025-01-14
Payer: COMMERCIAL

## 2025-01-14 NOTE — TELEPHONE ENCOUNTER
Based on pts labs she is aware she does not need future phlebotomy's scheduled. And hugo said we could cancel march 3rds apt.

## 2025-01-30 RX ORDER — HYDROXYZINE HYDROCHLORIDE 25 MG/1
25 TABLET, FILM COATED ORAL
Qty: 30 TABLET | Refills: 5 | Status: SHIPPED | OUTPATIENT
Start: 2025-01-30

## 2025-02-05 ENCOUNTER — TELEPHONE (OUTPATIENT)
Dept: INTERNAL MEDICINE | Facility: CLINIC | Age: 40
End: 2025-02-05
Payer: COMMERCIAL

## 2025-02-05 NOTE — TELEPHONE ENCOUNTER
Spoke with patient and she is wanting to know if she can apply triamcinolone to her eyelid, please advise

## 2025-02-05 NOTE — TELEPHONE ENCOUNTER
Hub staff attempted to follow warm transfer process and was unsuccessful     Caller: Minh Drake    Relationship to patient: Self    Best call back number: [phone]     Chief complaint: LEFT EYE, BUMPS, ITCHING, SWOLLEN AND RED X 2 DAYS    Type of visit: SAME DAY    Requested date: 2.5.2025     Additional notes:CONTACT PATIENT TO ADVISE.

## 2025-02-14 ENCOUNTER — PROCEDURE VISIT (OUTPATIENT)
Dept: OTHER | Facility: HOSPITAL | Age: 40
End: 2025-02-14
Payer: COMMERCIAL

## 2025-02-14 DIAGNOSIS — K76.0 FATTY LIVER: ICD-10-CM

## 2025-02-14 PROCEDURE — 91200 LIVER ELASTOGRAPHY: CPT | Performed by: NURSE PRACTITIONER

## 2025-02-18 NOTE — PROGRESS NOTES
Liver Elastography    Performed by: Elina Ragland APRN  Authorized by: Stefanie Cueva APRN  Ordering Provider: Stefanie Cueva APRN    Procedure Details:  Procedure: After providing an oral and written explanation of the Fibroscan vibration controlled transient elastography (VTCE) test procedure to the patient. The patient was placed in supine position with right arm in maximum abduction to allow optimal exposure of right lateral abdomen. Patient was briefly assessed, identifying terminus of the xyphoid process and locating an ideal transient elastography testing site, midline and lateral to this point. Patient was instructed to breathe normally and remain stationary during the test process. Pre-measurement data confirmed the transient elastography probe was centered over the liver parenchyma. A series of ten 50 Hz mechanical pulses were applied with controlled application pressure to induce a mechanical shear wave in the liver tissue. For each measurement, the shear wave propagation speed was detected, displayed and converted to its equivalent liver stiffness value in kilopascals. Skin to liver capsules distance and shear wave characteristics were monitored during the entire examination to assure quality data. Median liver stiffness measurement and interquartile range were calculated and displayed in real time. Acquired measurement data was stored and submitted to the provider for review and interpretation. Patient tolerated the procedure well and was discharged without incident.   Clinical Information:     NPO 3 Hours or More: Yes      Actively Drinking: No    Findings:     Median Liver Stiffness Score:  14.1    Interquartile Range (IQR) to Median Ratio:  16    Nereyda Stiffness Consistent with:  F4 Cirrhosis    Current Scan Considered Reliab: Yes      Median Controlled Attenuation Parameter (dB/m):  340    IQR:  4    CAP SCORE:  Moderate/severe liver fat

## 2025-02-27 ENCOUNTER — OFFICE VISIT (OUTPATIENT)
Dept: INTERNAL MEDICINE | Facility: CLINIC | Age: 40
End: 2025-02-27
Payer: COMMERCIAL

## 2025-02-27 ENCOUNTER — TELEPHONE (OUTPATIENT)
Dept: GASTROENTEROLOGY | Facility: CLINIC | Age: 40
End: 2025-02-27
Payer: COMMERCIAL

## 2025-02-27 VITALS
HEART RATE: 90 BPM | RESPIRATION RATE: 18 BRPM | BODY MASS INDEX: 33.49 KG/M2 | SYSTOLIC BLOOD PRESSURE: 122 MMHG | HEIGHT: 63 IN | WEIGHT: 189 LBS | DIASTOLIC BLOOD PRESSURE: 70 MMHG | OXYGEN SATURATION: 97 % | TEMPERATURE: 97.4 F

## 2025-02-27 DIAGNOSIS — F41.1 GAD (GENERALIZED ANXIETY DISORDER): ICD-10-CM

## 2025-02-27 DIAGNOSIS — E83.110 HEMOCHROMATOSIS ASSOCIATED WITH MUTATION IN HFE GENE: ICD-10-CM

## 2025-02-27 DIAGNOSIS — F33.0 MAJOR DEPRESSIVE DISORDER, RECURRENT, MILD: ICD-10-CM

## 2025-02-27 DIAGNOSIS — R79.89 ELEVATED FERRITIN: Primary | ICD-10-CM

## 2025-02-27 DIAGNOSIS — M79.10 MYALGIA: ICD-10-CM

## 2025-02-27 PROCEDURE — 99214 OFFICE O/P EST MOD 30 MIN: CPT | Performed by: NURSE PRACTITIONER

## 2025-02-27 RX ORDER — DULOXETIN HYDROCHLORIDE 30 MG/1
30 CAPSULE, DELAYED RELEASE ORAL DAILY
Qty: 90 CAPSULE | Refills: 1 | Status: SHIPPED | OUTPATIENT
Start: 2025-02-27

## 2025-02-27 RX ORDER — BUSPIRONE HYDROCHLORIDE 5 MG/1
10 TABLET ORAL 3 TIMES DAILY PRN
Qty: 180 TABLET | Refills: 1 | Status: SHIPPED | OUTPATIENT
Start: 2025-02-27

## 2025-02-27 NOTE — TELEPHONE ENCOUNTER
S/w pt, she verbalized understanding and will keep F/U appt to discuss. She states she also will be seeking a second opinion for hematology.

## 2025-02-27 NOTE — TELEPHONE ENCOUNTER
I need to discuss it with her so I will do that and order it at her visit on 3/11 (it's ok to wait til then, this is not an urgent situation)

## 2025-02-27 NOTE — TELEPHONE ENCOUNTER
Pt called stating she has seen her PCP and discussed possibly having the Liver Bx and pt would now like to proceed. She would like the order placed and a call with more information about it. She is also wondering if she should keep her F/U appt on 3.11.25.

## 2025-02-27 NOTE — PROGRESS NOTES
Chief Complaint  Anxiety and depression (3 month follow up Duloxetine 30 mg QD and Buspar 5 MG as needed), Myalgia (Patient is taking 10 MG of Melatonin to help her sleep due to pain. ), and Hemochromatosis (Hematologist stopped lab due to levels. Patient would like to continue to do hematologist and labs. Gi did Liver US and didn't need to do biopsy or further lab work. )      Subjective      History of Present Illness  The patient is a 39-year-old female presenting for follow-up regarding chronic pain, anxiety, depression, and hemochromatosis.    She is currently under the care of a hematologist, with her last therapeutic phlebotomy performed in December 2024 for elevated ferritin. Blood work conducted in January 2025 revealed a significant decrease in several parameters; however, treatment was discontinued and the patient was recommended to have donation with the [facility]. She reports issues with their process that result in her feeling unwell following donation.      A FibroScan indicated an increased risk, suggesting the need for a liver biopsy or additional blood work. The patient is seeking advice on these options and expresses concern about the risks associated with a biopsy. She is open to resuming therapeutic phlebotomies and is considering hospital-based blood donation. The patient reports escalating pain, sleep disturbances, and is contemplating the use of prescription melatonin. She noted symptomatic improvement following phlebotomy and a decrease in laboratory values. A follow-up appointment with the gastroenterologist is scheduled for March 11, 2025.    Her anxiety is poorly managed, and she avoids taking BuSpar despite having it available. She attributes the worsening of her anxiety to work-related stress and health uncertainties due to the lack of recent blood work.  Previously tried increasing Cymbalta to 60 mg but she did not tolerate this.    The patient has been using over-the-counter melatonin  "gummies for sleep, increasing the dosage due to persistent pain, and is requesting a prescription for melatonin tablets.    She experienced left ear pressure two days ago and has been using a nasal spray for relief.             Objective   Vital Signs:   Vitals:    02/27/25 0739   BP: 122/70   BP Location: Left arm   Patient Position: Sitting   Cuff Size: Adult   Pulse: 90   Resp: 18   Temp: 97.4 °F (36.3 °C)   TempSrc: Temporal   SpO2: 97%   Weight: 85.7 kg (189 lb)   Height: 161 cm (63.39\")     Body mass index is 33.07 kg/m².    Wt Readings from Last 3 Encounters:   02/27/25 85.7 kg (189 lb)   02/05/25 83.5 kg (184 lb)   01/08/25 85.1 kg (187 lb 9.6 oz)     BP Readings from Last 3 Encounters:   02/27/25 122/70   02/05/25 132/93   01/08/25 129/94       Health Maintenance   Topic Date Due    BMI FOLLOWUP  08/12/2025    LIPID PANEL  10/03/2025    ANNUAL PHYSICAL  11/22/2025    PAP SMEAR  12/07/2025    COLORECTAL CANCER SCREENING  12/12/2031    TDAP/TD VACCINES (3 - Td or Tdap) 12/23/2034    HEPATITIS C SCREENING  Completed    COVID-19 Vaccine  Completed    Pneumococcal Vaccine 0-49  Completed    INFLUENZA VACCINE  Completed       Physical Exam  Vitals and nursing note reviewed.   Constitutional:       General: She is not in acute distress.     Appearance: Normal appearance.   HENT:      Head: Normocephalic and atraumatic.      Right Ear: Tympanic membrane, ear canal and external ear normal.      Left Ear: Tympanic membrane, ear canal and external ear normal.      Nose: Nose normal.      Mouth/Throat:      Mouth: Mucous membranes are moist.   Eyes:      Conjunctiva/sclera: Conjunctivae normal.   Cardiovascular:      Rate and Rhythm: Normal rate and regular rhythm.      Pulses: Normal pulses.      Heart sounds: Normal heart sounds. No murmur heard.     No friction rub. No gallop.   Pulmonary:      Effort: Pulmonary effort is normal. No respiratory distress.      Breath sounds: No wheezing, rhonchi or rales. "   Musculoskeletal:      Cervical back: Neck supple.   Lymphadenopathy:      Cervical: No cervical adenopathy.   Skin:     General: Skin is warm and dry.   Neurological:      General: No focal deficit present.      Mental Status: She is alert and oriented to person, place, and time.   Psychiatric:         Mood and Affect: Mood normal.         Behavior: Behavior normal.        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 Hemochromatosis/elevated ferritin  - Elevated ferritin levels necessitate further investigation via liver biopsy  - Referral to Dr. John Banegas, hematologist, for second opinion  - Continue follow-up with gastroenterology to discuss potential biopsy  - Contact us if no call from Dr. Banegas' office within a week    2. Anxiety  - Currently on BuSpar 5 mg, up to three times daily as needed  - Dosage can increase to 10 mg, up to three times daily, on particularly anxious days  - BuSpar refill provided    3. Insomnia  - Prescription for melatonin 5 mg issued, take two tablets nightly  - Dosage can increase to 10 mg on difficult nights  - Prescription sent to [facility] in [place]    4. Depression  - Continue current Cymbalta regimen  - Cymbalta refill provided        Patient or patient representative verbalized consent for the use of Ambient Listening during the visit with  KYUNG Thapa for chart documentation. 2/27/2025  13:26 EST      FOLLOW UP  Return in about 2 months (around 4/27/2025).  Patient was given instructions and counseling regarding her condition  or for health maintenance advice. Please see specific information pulled into the AVS if appropriate.     Jackelyn BALL Danie, APRN  02/27/25  13:27 EST    CURRENT & DISCONTINUED MEDICATIONS  Current Outpatient Medications   Medication Instructions    busPIRone (BUSPAR) 10 mg, Oral, 3 Times Daily PRN    cetirizine (ZYRTEC) 10 mg, Oral, Daily    DULoxetine (CYMBALTA) 30 mg, Oral, Daily    hydrOXYzine (ATARAX) 25 mg, Oral, Every Night at Bedtime    Melatonin 10 mg, Oral, Nightly PRN    methocarbamol (ROBAXIN) 500 mg, Oral, 4 Times Daily, 1 in the morning and 2 at night    naproxen (NAPROSYN) 500 mg, Oral, 2 Times Daily With Meals    NuvaRing 0.12-0.015 MG/24HR vaginal ring 1 each, Vaginal, Every 28 Days, Insert vaginally and leave in place for 3 consecutive weeks, then remove for 1 week.    triamcinolone (KENALOG) 0.1 % ointment     Triamcinolone Acetonide (Nasacort Allergy 24HR) 55 MCG/ACT nasal inhaler 2 sprays, Nasal, Daily    vitamin B-12 (CYANOCOBALAMIN) 1,000 mcg, Daily    vitamin D (ERGOCALCIFEROL) 50,000 Units, Oral, Weekly    vitamin E 100 Units, Daily       Medications Discontinued During This Encounter   Medication Reason    triamcinolone (KENALOG) 0.1 % cream *Therapy completed    Melatonin 10 MG tablet     DULoxetine (CYMBALTA) 30 MG capsule Reorder    busPIRone (BUSPAR) 5 MG tablet Reorder

## 2025-03-03 ENCOUNTER — TELEPHONE (OUTPATIENT)
Dept: INTERNAL MEDICINE | Facility: CLINIC | Age: 40
End: 2025-03-03
Payer: COMMERCIAL

## 2025-03-03 NOTE — TELEPHONE ENCOUNTER
Patient called office stating since last appt last week her symptoms has gotten worse she stated she can't eat, she can't move, she is having a migraine, she can't sleep, she stated she can feel there is not really a good circulation between her shoulder blades and neck and she thinks this is why she is having a migraine, patient stated she has been taken her medication as prescribe but is does not seem to help, patient also stated she is not able to sleep she even took melatonin and that did not help, patient is wanting to know what pcp recommends, please advise

## 2025-03-07 ENCOUNTER — TELEPHONE (OUTPATIENT)
Dept: INTERNAL MEDICINE | Facility: CLINIC | Age: 40
End: 2025-03-07
Payer: COMMERCIAL

## 2025-03-07 NOTE — TELEPHONE ENCOUNTER
Caller: Minh Drake    Relationship to patient: Self    Best call back number: [phone]    Patient is needing: PATIENT CALLED REQUESTING TO CANCEL HER UPCOMING APPT ON 3.10.25 WITH [facility]. HUB UNABLE TO CANCEL AS IT STATES SOMEONE ELSE IS ALREADY IN HER CHART AND IT WOULD NOT ALLOW HUB TO GO ANY FURTHER. PATIENT DID NOT WANT TO RESCHEDULE AND JUST ASKED THAT THIS PLEASE BE CANCELLED AT THIS TIME.

## 2025-03-11 ENCOUNTER — OFFICE VISIT (OUTPATIENT)
Dept: GASTROENTEROLOGY | Facility: CLINIC | Age: 40
End: 2025-03-11
Payer: COMMERCIAL

## 2025-03-11 ENCOUNTER — PREP FOR SURGERY (OUTPATIENT)
Dept: OTHER | Facility: HOSPITAL | Age: 40
End: 2025-03-11
Payer: COMMERCIAL

## 2025-03-11 VITALS
HEIGHT: 63 IN | WEIGHT: 188.2 LBS | DIASTOLIC BLOOD PRESSURE: 89 MMHG | HEART RATE: 97 BPM | BODY MASS INDEX: 33.35 KG/M2 | SYSTOLIC BLOOD PRESSURE: 135 MMHG

## 2025-03-11 DIAGNOSIS — Z14.8 HEMOCHROMATOSIS CARRIER: ICD-10-CM

## 2025-03-11 DIAGNOSIS — E66.811 CLASS 1 OBESITY WITH SERIOUS COMORBIDITY AND BODY MASS INDEX (BMI) OF 32.0 TO 32.9 IN ADULT, UNSPECIFIED OBESITY TYPE: ICD-10-CM

## 2025-03-11 DIAGNOSIS — R79.89 ELEVATED FERRITIN: ICD-10-CM

## 2025-03-11 DIAGNOSIS — K76.0 FATTY LIVER: Primary | ICD-10-CM

## 2025-03-11 DIAGNOSIS — R79.89 ELEVATED LFTS: ICD-10-CM

## 2025-03-11 PROCEDURE — 99214 OFFICE O/P EST MOD 30 MIN: CPT | Performed by: NURSE PRACTITIONER

## 2025-03-11 NOTE — PROGRESS NOTES
Patient Name: Minh Drake   Visit Date: 03/11/2025   Patient ID: [id number]  Provider: KYUNG Aguilar    Sex: female  Location:  Location Address:  Location Phone: [phone]  [place] [address] [phone]    YOB: 1985  Age: 39 y.o.   Primary Care Provider Jaceklyn Helton APRN      Referring Provider: No ref. provider found        Chief Complaint  Fatty Liver (Follow up, +6#), Diarrhea, and Abdominal Pain    History of Present Illness    Patient initially presented 9/15/2022 with elevated LFTs, reported having alcohol maybe once a month.  Ultrasound of the liver in August showed fatty liver and common bile duct 6.4 mm with gallbladder, no gallstones seen  MRI of the abdomen with and without contrast MRCP 10/14/2022: Significant fatty liver, enlarged to 21 cm, CBD 4 mm, trace bilateral pleural effusions-patient advised to follow-up with primary care for this     Liver work-up negative other than ferritin 842, iron saturation was normal, iron was normal, as a precaution hemochromatosis gene test--  1 gene detected, heterozygous finding usually not associated with increased risk of clinical symptoms     Colonoscopy 12/12/2023: Normal colonoscopy, good prep  Random colon biopsies were negative  Last liver ultrasound 5/1/2024:Hepatic steatosis otherwise negative    FibroScan 8/16/2024: Moderate to severe liver fat, F3     Patient was last seen 11/6/2024, reported having phlebotomy the end of September, reported having some diarrhea as she associated with traveling and going to [place].  Stool studies checked 11/7/2024 and positive for occult blood, positive Salmonella, lactoferrin positive C. difficile negative-patient gradually improved with supportive care  Discussed with Dr. Melo using Felix, he recommended repeating FibroScan in 6 months    Last liver ultrasound 11/13/2024: Moderate diffuse fatty liver    Repeat FibroScan 2/14/2025 showed F4 and moderate to severe liver  fat    Last LFTs-11/22: AST 96, ALT 55 which is worse than in September  Normal INR and normal platelets at 328, bilirubin normal at 0.2, albumin normal at 4.4 on last labs    Pt is getting a second opinion with hematology d/t elev ferritin and states no further phelobotomy recommended. Pt states she felt better afterwards.  Pt states she currently has a cold and has sore throat. Afebrile, child has also been ill.   Pt states she's had some increased anxiety, Buspar has been added.   Pt states she is trying to do low carb diet now but admits not doing well previously.   Pt denies diarrhea or abd pain.   Past Medical History:   Diagnosis Date    Allergic rhinitis     Asthma     4th grade    Broken bones     Elementary    Hemochromatosis     History of medical problems     hemochromatosis    Migraine headache     Sinus trouble     Strep throat 07/06/2024       Past Surgical History:   Procedure Laterality Date    COLONOSCOPY N/A 12/12/2023    Procedure: COLONOSCOPY WITH BIOPSIES;  Surgeon: Tee Melo MD;  Location: [facility] ENDOSCOPY;  Service: Gastroenterology;  Laterality: N/A;  NORMAL    WISDOM TOOTH EXTRACTION         Allergies   Allergen Reactions    Bee Pollen-Propolis-Royaljelly Unknown - Low Severity    Brompheniramine-Pseudoeph Rash    Cephalexin Rash    Propylene Glycol Unknown - Low Severity     Found during allergy testing    Titanium Unknown - Low Severity     Found during allergy testing       Family History   Problem Relation Age of Onset    Prostate cancer Father     Cataracts Father     Cancer Father         Prostate Cancer    Breast cancer Sister 44    Asthma Sister     Cancer Sister         Breast Cancer    Colon cancer Neg Hx     Ovarian cancer Neg Hx     Uterine cancer Neg Hx         Social History     Tobacco Use    Smoking status: Never     Passive exposure: Never    Smokeless tobacco: Never   Vaping Use    Vaping status: Never Used   Substance Use Topics    Alcohol use: Never    Drug  "use: Never       Objective     Vital Signs:   /89 (BP Location: Right arm, Patient Position: Sitting, Cuff Size: Adult)   Pulse 97   Ht 161 cm (63.39\")   Wt 85.4 kg (188 lb 3.2 oz)   BMI 32.93 kg/m²       Physical Exam  Constitutional:       General: The patient is not in acute distress.     Appearance: Normal appearance.   HENT:      Head: Normocephalic and atraumatic.      Nose: Nose normal.   Pulmonary:      Effort: Pulmonary effort is normal. No respiratory distress.   Abdominal:      General: Abdomen is flat.      Palpations: Abdomen is soft. There is no mass.      Tenderness: There is no abdominal tenderness. There is no guarding.   Musculoskeletal:      Cervical back: Neck supple.      Right lower leg: No edema.      Left lower leg: No edema.   Skin:     General: Skin is warm and dry.   Neurological:      General: No focal deficit present.      Mental Status: The patient is alert and oriented to person, place, and time.      Gait: Gait normal.   Psychiatric:         Mood and Affect: Mood normal.         Speech: Speech normal.         Behavior: Behavior normal.         Thought Content: Thought content normal.     Result Review :   The following data was reviewed by: KYUNG Aguilar on 03/11/2025:    CBC w/diff          9/9/2024    10:34 11/22/2024    11:46 1/8/2025    08:45   CBC w/Diff   WBC 10.10  11.96  11.96    RBC 4.37  4.52  4.49    Hemoglobin 13.4  13.6  13.2    Hematocrit 40.4  41.6  42.3    MCV 92.4  92.0  94.2    MCH 30.7  30.1  29.4    MCHC 33.2  32.7  31.2    RDW 11.9  12.0  12.3    Platelets 294  323  328    Neutrophil Rel %  53.7  55.1    Immature Granulocyte Rel %  0.3  0.3    Lymphocyte Rel %  34.9  31.1    Monocyte Rel %  5.8  6.3    Eosinophil Rel %  4.3  6.1    Basophil Rel %  1.0  1.1      CMP          9/9/2024    10:34 11/22/2024    11:46   CMP   Glucose  85    BUN  7    Creatinine  0.57    EGFR  119.5    Sodium  138    Potassium  4.1    Chloride  103    Calcium  " 9.3    Total Protein 7.9  8.0    Albumin 4.4  4.4    Globulin  3.6    Total Bilirubin 0.2  0.2    Alkaline Phosphatase 52  61    AST (SGOT) 39  96    ALT (SGPT) 29  55    Albumin/Globulin Ratio  1.2    BUN/Creatinine Ratio  12.3    Anion Gap  13.1        AFP   ALPHA-FETOPROTEIN   Date Value Ref Range Status   09/15/2022 2.86 0 - 8.3 ng/mL Final      PT/INR   Protime   Date Value Ref Range Status   09/09/2024 13.2 11.8 - 14.9 Seconds Final     INR   Date Value Ref Range Status   09/09/2024 0.98 0.86 - 1.15 Final               Assessment and Plan    Diagnoses and all orders for this visit:    1. Fatty liver (Primary)  -     Protime-INR; Future  -     CBC (No Diff); Future  -     Comprehensive Metabolic Panel; Future  -     aPTT; Future    2. Hemochromatosis carrier    3. Elevated LFTs    4. Elevated ferritin    5. Class 1 obesity with serious comorbidity and body mass index (BMI) of 32.0 to 32.9 in adult, unspecified obesity type              Follow Up   Return in about 3 months (around 6/11/2025).  Due to discordant results, liver biopsy recommended; if no cirrhosis but F2-F3 pt would be candidate for Rezdiffra.   Liver biopsy discussed with patient; risks and benefits discussed including infection and bleeding, patient was willing to proceed, pt understands labs needed the day before and will need transportation.  Pt willing to do biopsy in [place].   Cont low carb diet and weight loss, 2-4 c. Coffee/d    Patient was given instructions and counseling regarding her condition or for health maintenance advice. Please see specific information pulled into the AVS if appropriate.

## 2025-03-12 ENCOUNTER — TELEPHONE (OUTPATIENT)
Dept: GASTROENTEROLOGY | Facility: CLINIC | Age: 40
End: 2025-03-12
Payer: COMMERCIAL

## 2025-03-12 NOTE — TELEPHONE ENCOUNTER
Attempted to contact [facility] to schedule pt's Liver biopsy, the  (Adeline) didn't answer but I left a detailed message with my direct line to call and schedule.

## 2025-03-13 ENCOUNTER — TELEPHONE (OUTPATIENT)
Dept: INTERNAL MEDICINE | Facility: CLINIC | Age: 40
End: 2025-03-13
Payer: COMMERCIAL

## 2025-03-13 NOTE — TELEPHONE ENCOUNTER
Caller: Minh Drake    Relationship to patient: Self    Best call back number:   Telephone Information:   Mobile [phone]        Chief complaint: FEVER BODY ACHES COUGH CONGESTION    Type of visit: OFFICE VISIT     Requested date: 3/14    Additional notes:CHILD TESTED POSITIVE FOR FLU TODAY

## 2025-03-13 NOTE — TELEPHONE ENCOUNTER
Called and s/w [facility] to schedule. Their  for the liver biopsies is out until Monday, they have to have a doctor review the diagnosis before they can schedule. I will call Monday to F/U. Called and notified pt

## 2025-03-13 NOTE — TELEPHONE ENCOUNTER
Called and spoke with pt, explained to pt I did not have any same day apts today or tomorrow, did explain to pt I can schedule apt for next available apt, pt stated when she called into office the HUB transferred her to the Urgent care so pt was able to speak with someone at the urgent care and no longer needs an apt with us.

## 2025-03-19 ENCOUNTER — TELEPHONE (OUTPATIENT)
Dept: INTERNAL MEDICINE | Facility: CLINIC | Age: 40
End: 2025-03-19
Payer: COMMERCIAL

## 2025-03-19 NOTE — TELEPHONE ENCOUNTER
Caller: Minh Drake    Relationship to patient: Self    Best call back number: [phone]    Chief complaint: COUGH, PAIN IN RIGHT SIDE     Type of visit: OFFICE VISIT     Requested date: AS SOON AS POSSIBLE        Additional notes: PATIENT WOULD PREFER NOT TO GO TO URGENT CARE, SHE WAS DIAGNOSED WITH THE FLU THERE AND HAS NOT GOTTEN BETTER. SHE WOULD LIKE TO SEE A PROVIDER IN THE OFFICE AS SOON AS POSSIBLE.

## 2025-03-19 NOTE — TELEPHONE ENCOUNTER
Called and spoke with pt, explained to pt I did not have any same day apts today or tomorrow, did offer to schedule apt with another provider in office for Friday, pt was agreeable, apt has been scheduled for Friday.

## 2025-03-21 ENCOUNTER — OFFICE VISIT (OUTPATIENT)
Dept: INTERNAL MEDICINE | Facility: CLINIC | Age: 40
End: 2025-03-21
Payer: COMMERCIAL

## 2025-03-21 ENCOUNTER — LAB (OUTPATIENT)
Dept: LAB | Facility: HOSPITAL | Age: 40
End: 2025-03-21
Payer: COMMERCIAL

## 2025-03-21 VITALS
TEMPERATURE: 98.4 F | BODY MASS INDEX: 32.6 KG/M2 | WEIGHT: 184 LBS | HEIGHT: 63 IN | DIASTOLIC BLOOD PRESSURE: 80 MMHG | SYSTOLIC BLOOD PRESSURE: 138 MMHG | OXYGEN SATURATION: 98 % | RESPIRATION RATE: 18 BRPM | HEART RATE: 87 BPM

## 2025-03-21 DIAGNOSIS — R05.2 SUBACUTE COUGH: Primary | ICD-10-CM

## 2025-03-21 DIAGNOSIS — K76.0 FATTY LIVER: ICD-10-CM

## 2025-03-21 LAB
ALBUMIN SERPL-MCNC: 3.9 G/DL (ref 3.5–5.2)
ALBUMIN/GLOB SERPL: 1 G/DL
ALP SERPL-CCNC: 63 U/L (ref 39–117)
ALT SERPL W P-5'-P-CCNC: 52 U/L (ref 1–33)
ANION GAP SERPL CALCULATED.3IONS-SCNC: 8.3 MMOL/L (ref 5–15)
APTT PPP: 26.4 SECONDS (ref 24.2–34.2)
AST SERPL-CCNC: 74 U/L (ref 1–32)
BILIRUB CONJ SERPL-MCNC: 0.1 MG/DL (ref 0–0.3)
BILIRUB SERPL-MCNC: 0.3 MG/DL (ref 0–1.2)
BUN SERPL-MCNC: 11 MG/DL (ref 6–20)
BUN/CREAT SERPL: 17.7 (ref 7–25)
CALCIUM SPEC-SCNC: 9.7 MG/DL (ref 8.6–10.5)
CHLORIDE SERPL-SCNC: 105 MMOL/L (ref 98–107)
CO2 SERPL-SCNC: 23.7 MMOL/L (ref 22–29)
CREAT SERPL-MCNC: 0.62 MG/DL (ref 0.57–1)
DEPRECATED RDW RBC AUTO: 37.6 FL (ref 37–54)
EGFRCR SERPLBLD CKD-EPI 2021: 117.1 ML/MIN/1.73
ERYTHROCYTE [DISTWIDTH] IN BLOOD BY AUTOMATED COUNT: 11.7 % (ref 12.3–15.4)
GLOBULIN UR ELPH-MCNC: 3.8 GM/DL
GLUCOSE SERPL-MCNC: 126 MG/DL (ref 65–99)
HCT VFR BLD AUTO: 39.5 % (ref 34–46.6)
HGB BLD-MCNC: 13.5 G/DL (ref 12–15.9)
INR PPP: 1 (ref 0.86–1.15)
MCH RBC QN AUTO: 30.3 PG (ref 26.6–33)
MCHC RBC AUTO-ENTMCNC: 34.2 G/DL (ref 31.5–35.7)
MCV RBC AUTO: 88.8 FL (ref 79–97)
PLATELET # BLD AUTO: 335 10*3/MM3 (ref 140–450)
PMV BLD AUTO: 10.4 FL (ref 6–12)
POTASSIUM SERPL-SCNC: 4 MMOL/L (ref 3.5–5.2)
PROT SERPL-MCNC: 7.7 G/DL (ref 6–8.5)
PROTHROMBIN TIME: 13.6 SECONDS (ref 11.8–14.9)
RBC # BLD AUTO: 4.45 10*6/MM3 (ref 3.77–5.28)
SODIUM SERPL-SCNC: 137 MMOL/L (ref 136–145)
WBC NRBC COR # BLD AUTO: 9.39 10*3/MM3 (ref 3.4–10.8)

## 2025-03-21 PROCEDURE — 85027 COMPLETE CBC AUTOMATED: CPT

## 2025-03-21 PROCEDURE — 85610 PROTHROMBIN TIME: CPT

## 2025-03-21 PROCEDURE — 82248 BILIRUBIN DIRECT: CPT

## 2025-03-21 PROCEDURE — 36415 COLL VENOUS BLD VENIPUNCTURE: CPT

## 2025-03-21 PROCEDURE — 99213 OFFICE O/P EST LOW 20 MIN: CPT | Performed by: INTERNAL MEDICINE

## 2025-03-21 PROCEDURE — 85730 THROMBOPLASTIN TIME PARTIAL: CPT

## 2025-03-21 PROCEDURE — 80053 COMPREHEN METABOLIC PANEL: CPT

## 2025-03-21 RX ORDER — BENZONATATE 100 MG/1
100 CAPSULE ORAL 3 TIMES DAILY PRN
Qty: 30 CAPSULE | Refills: 0 | Status: SHIPPED | OUTPATIENT
Start: 2025-03-21 | End: 2025-04-28

## 2025-04-09 ENCOUNTER — TELEPHONE (OUTPATIENT)
Dept: INTERNAL MEDICINE | Facility: CLINIC | Age: 40
End: 2025-04-09
Payer: COMMERCIAL

## 2025-04-24 ENCOUNTER — TRANSCRIBE ORDERS (OUTPATIENT)
Dept: ADMINISTRATIVE | Facility: HOSPITAL | Age: 40
End: 2025-04-24
Payer: COMMERCIAL

## 2025-04-24 DIAGNOSIS — Z14.8 CARRIER OF HEMOCHROMATOSIS HFE GENE MUTATION: Primary | ICD-10-CM

## 2025-04-24 DIAGNOSIS — R79.89 ELEVATED FERRITIN LEVEL: ICD-10-CM

## 2025-04-25 RX ORDER — BUSPIRONE HYDROCHLORIDE 5 MG/1
TABLET ORAL
Qty: 180 TABLET | Refills: 1 | Status: SHIPPED | OUTPATIENT
Start: 2025-04-25 | End: 2025-04-28 | Stop reason: SDUPTHER

## 2025-04-28 ENCOUNTER — OFFICE VISIT (OUTPATIENT)
Dept: INTERNAL MEDICINE | Facility: CLINIC | Age: 40
End: 2025-04-28
Payer: COMMERCIAL

## 2025-04-28 VITALS
TEMPERATURE: 96.9 F | OXYGEN SATURATION: 97 % | HEART RATE: 94 BPM | DIASTOLIC BLOOD PRESSURE: 78 MMHG | HEIGHT: 63 IN | WEIGHT: 185.6 LBS | RESPIRATION RATE: 18 BRPM | BODY MASS INDEX: 32.89 KG/M2 | SYSTOLIC BLOOD PRESSURE: 118 MMHG

## 2025-04-28 DIAGNOSIS — E83.110 HEMOCHROMATOSIS ASSOCIATED WITH MUTATION IN HFE GENE: Primary | ICD-10-CM

## 2025-04-28 DIAGNOSIS — R79.89 ELEVATED FERRITIN: ICD-10-CM

## 2025-04-28 DIAGNOSIS — M94.0 COSTOCHONDRITIS: ICD-10-CM

## 2025-04-28 DIAGNOSIS — F41.1 GAD (GENERALIZED ANXIETY DISORDER): ICD-10-CM

## 2025-04-28 DIAGNOSIS — G47.00 INSOMNIA, UNSPECIFIED TYPE: Chronic | ICD-10-CM

## 2025-04-28 DIAGNOSIS — K74.00 LIVER FIBROSIS: ICD-10-CM

## 2025-04-28 DIAGNOSIS — F33.0 MAJOR DEPRESSIVE DISORDER, RECURRENT, MILD: ICD-10-CM

## 2025-04-28 RX ORDER — METHOCARBAMOL 500 MG/1
500 TABLET, FILM COATED ORAL 4 TIMES DAILY
Qty: 120 TABLET | Refills: 2 | Status: SHIPPED | OUTPATIENT
Start: 2025-04-28

## 2025-04-28 RX ORDER — BUSPIRONE HYDROCHLORIDE 5 MG/1
5 TABLET ORAL 3 TIMES DAILY
Qty: 180 TABLET | Refills: 1 | Status: SHIPPED | OUTPATIENT
Start: 2025-04-28

## 2025-04-28 RX ORDER — DULOXETIN HYDROCHLORIDE 30 MG/1
30 CAPSULE, DELAYED RELEASE ORAL DAILY
Qty: 90 CAPSULE | Refills: 1 | Status: SHIPPED | OUTPATIENT
Start: 2025-04-28

## 2025-04-28 RX ORDER — ACETAMINOPHEN 325 MG/1
325 TABLET ORAL EVERY 4 HOURS PRN
COMMUNITY
Start: 2025-03-20

## 2025-04-28 RX ORDER — CETIRIZINE HYDROCHLORIDE 10 MG/1
10 TABLET ORAL DAILY
Qty: 90 TABLET | Refills: 1 | Status: SHIPPED | OUTPATIENT
Start: 2025-04-28

## 2025-04-28 NOTE — PROGRESS NOTES
"Chief Complaint  Insomnia, Anxiety and depression (2 month follow up ), and Hemochromatosis/elevated ferritin (Seen Oncology- scheduled for infusion )      Subjective      History of Present Illness  The patient is a 39-year-old female who presents for follow-up regarding insomnia, anxiety, depression, hemochromatosis, and elevated ferritin levels.    The patient reports exacerbation of anxiety and depression, necessitating increased use of Buspirone (BuSpar) secondary to occupational stress.    She describes severe myalgia localized to the arm, distinct from arthralgia, and expresses hope that therapeutic phlebotomy will provide relief.    The patient was recently diagnosed with influenza, accompanied by severe thoracic pain, managed by Dr. Lockhart. The thoracic pain has recurred, presenting as sharp pain during episodes of coughing. She continues to use antitussive medication and occasionally produces mucus. The pain is exacerbated by hiccups or coughing, and she is currently taking anti-inflammatory medication for symptom management.    She has been referred to Dr. Banegas for evaluation of elevated ferritin levels and hemochromatosis. Monthly therapeutic phlebotomy is scheduled to commence on 05/01/2025. The patient requests an update on her Family and Medical Leave Act (FMLA) paperwork and reports undergoing liver ultrasonography every six months. Mrs. Cueva has recommended the continuation of biannual liver ultrasounds and a referral to a hepatologist in [place].  A liver biopsy has been performed, with a follow-up appointment with Dr. Banegas scheduled for 10/2025. She would also like to see a hepatologist, Dr. Bond.          Objective   Vital Signs:   Vitals:    04/28/25 0745   BP: 118/78   BP Location: Left arm   Patient Position: Sitting   Cuff Size: Adult   Pulse: 94   Resp: 18   Temp: 96.9 °F (36.1 °C)   TempSrc: Temporal   SpO2: 97%   Weight: 84.2 kg (185 lb 9.6 oz)   Height: 161 cm (63.39\")     Body " mass index is 32.47 kg/m².    Wt Readings from Last 3 Encounters:   04/28/25 84.2 kg (185 lb 9.6 oz)   03/21/25 83.5 kg (184 lb)   03/14/25 85.3 kg (188 lb)     BP Readings from Last 3 Encounters:   04/28/25 118/78   03/21/25 138/80   03/14/25 131/98       Health Maintenance   Topic Date Due    Hepatitis B (1 of 3 - 19+ 3-dose series) Never done    INFLUENZA VACCINE  07/01/2025    LIPID PANEL  10/03/2025    ANNUAL PHYSICAL  11/22/2025    PAP SMEAR  12/07/2025    COLORECTAL CANCER SCREENING  12/12/2031    TDAP/TD VACCINES (3 - Td or Tdap) 12/23/2034    HEPATITIS C SCREENING  Completed    COVID-19 Vaccine  Completed    Pneumococcal Vaccine 0-49  Completed       Physical Exam  Vitals and nursing note reviewed.   Constitutional:       General: She is not in acute distress.     Appearance: Normal appearance.   HENT:      Head: Normocephalic and atraumatic.      Right Ear: External ear normal.      Left Ear: External ear normal.      Nose: Nose normal.      Mouth/Throat:      Mouth: Mucous membranes are moist.   Eyes:      Conjunctiva/sclera: Conjunctivae normal.   Cardiovascular:      Rate and Rhythm: Normal rate and regular rhythm.      Pulses: Normal pulses.      Heart sounds: Normal heart sounds. No murmur heard.     No friction rub. No gallop.   Pulmonary:      Effort: Pulmonary effort is normal. No respiratory distress.      Breath sounds: No wheezing, rhonchi or rales.   Musculoskeletal:      Cervical back: Neck supple.      Right lower leg: No edema.      Left lower leg: No edema.   Skin:     General: Skin is warm and dry.   Neurological:      General: No focal deficit present.      Mental Status: She is alert and oriented to person, place, and time.   Psychiatric:         Mood and Affect: Mood normal.         Behavior: Behavior normal.        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 Hemochromatosis:  - Monthly therapeutic phlebotomy starting 05/01/2025  - Referral to hepatologist initiated  - Biannual liver ultrasounds advised by GI  - Contact clinic if no call from hepatologist within 1-2 weeks    2. Elevated ferritin:  - Therapeutic phlebotomy to manage levels  - No additional labs needed  - Monitor ferritin through regular phlebotomy  - Periodic liver ultrasounds    3. Anxiety:  - Worsening anxiety, increased BuSpar use  - BuSpar refills provided  - Continue current regimen, seek further evaluation if symptoms persist    4. Depression:  - Worsening depression  - Cymbalta refills provided  - Continue current regimen, seek further evaluation if symptoms persist    5. Insomnia:  - Melatonin refills provided  - Continue current regimen, seek further evaluation if symptoms persist    6. Costochondritis:  - Symptoms suggest costochondritis  - Maintain hydration, continue anti-inflammatories  - Lungs clear on exam  - Pain management with anti-inflammatories    7. Muscle pain:  - Severe arm muscle pain  - Hopeful phlebotomy will alleviate  - Monitor response to phlebotomy  - Evaluate and adjust treatment as necessary    Follow-up:  - Follow-up in a couple of months  - Provide updated [place] paperwork  - Next appointment with Dr. Banegas in 10/2025    Patient or patient representative verbalized consent for the use of Ambient Listening during the visit with  KYUNG Thapa for chart documentation. 4/28/2025  10:50 EDT      FOLLOW UP  Return in about 2 months (around  6/28/2025).  Patient was given instructions and counseling regarding her condition or for health maintenance advice. Please see specific information pulled into the AVS if appropriate.     Jackelynwilliam Helton, APRN  04/28/25  10:51 EDT    CURRENT & DISCONTINUED MEDICATIONS  Current Outpatient Medications   Medication Instructions    acetaminophen (TYLENOL) 325 mg, Every 4 Hours PRN    busPIRone (BUSPAR) 5 mg, Oral, 3 Times Daily    cetirizine (ZYRTEC) 10 mg, Oral, Daily    DULoxetine (CYMBALTA) 30 mg, Oral, Daily    hydrOXYzine (ATARAX) 25 mg, Oral, Every Night at Bedtime    Melatonin 10 mg, Oral, Nightly PRN    methocarbamol (ROBAXIN) 500 mg, Oral, 4 Times Daily    naproxen (NAPROSYN) 500 mg, Oral, 2 Times Daily With Meals    NuvaRing 0.12-0.015 MG/24HR vaginal ring 1 each, Vaginal, Every 28 Days, Insert vaginally and leave in place for 3 consecutive weeks, then remove for 1 week.    Triamcinolone Acetonide (Nasacort Allergy 24HR) 55 MCG/ACT nasal inhaler 2 sprays, Nasal, Daily    vitamin B-12 (CYANOCOBALAMIN) 1,000 mcg, Daily    vitamin D (ERGOCALCIFEROL) 50,000 Units, Oral, Weekly    vitamin E 100 Units, Daily       Medications Discontinued During This Encounter   Medication Reason    benzonatate (Tessalon Perles) 100 MG capsule     azithromycin (Zithromax Z-Allen) 250 MG tablet     cetirizine (zyrTEC) 10 MG tablet Reorder    methocarbamol (ROBAXIN) 500 MG tablet Reorder    Melatonin 5 MG capsule Reorder    DULoxetine (CYMBALTA) 30 MG capsule Reorder    busPIRone (BUSPAR) 5 MG tablet Reorder

## 2025-05-01 ENCOUNTER — HOSPITAL ENCOUNTER (OUTPATIENT)
Dept: INFUSION THERAPY | Facility: HOSPITAL | Age: 40
Discharge: HOME | End: 2025-05-01
Admitting: INTERNAL MEDICINE
Payer: COMMERCIAL

## 2025-05-01 VITALS
SYSTOLIC BLOOD PRESSURE: 133 MMHG | DIASTOLIC BLOOD PRESSURE: 84 MMHG | OXYGEN SATURATION: 99 % | HEART RATE: 97 BPM | WEIGHT: 182.1 LBS | TEMPERATURE: 98.1 F | RESPIRATION RATE: 20 BRPM | HEIGHT: 63 IN | BODY MASS INDEX: 32.27 KG/M2

## 2025-05-01 DIAGNOSIS — E83.110 HEMOCHROMATOSIS ASSOCIATED WITH MUTATION IN HFE GENE: Primary | ICD-10-CM

## 2025-05-01 LAB
BASOPHILS # BLD AUTO: 0.12 10*3/MM3 (ref 0–0.2)
BASOPHILS NFR BLD AUTO: 0.9 % (ref 0–1.5)
DEPRECATED RDW RBC AUTO: 41.1 FL (ref 37–54)
EOSINOPHIL # BLD AUTO: 0.51 10*3/MM3 (ref 0–0.4)
EOSINOPHIL NFR BLD AUTO: 3.8 % (ref 0.3–6.2)
ERYTHROCYTE [DISTWIDTH] IN BLOOD BY AUTOMATED COUNT: 12.3 % (ref 12.3–15.4)
FERRITIN SERPL-MCNC: 306.6 NG/ML (ref 13–150)
HCT VFR BLD AUTO: 40.6 % (ref 34–46.6)
HGB BLD-MCNC: 13.6 G/DL (ref 12–15.9)
IMM GRANULOCYTES # BLD AUTO: 0.03 10*3/MM3 (ref 0–0.05)
IMM GRANULOCYTES NFR BLD AUTO: 0.2 % (ref 0–0.5)
LYMPHOCYTES # BLD AUTO: 5.01 10*3/MM3 (ref 0.7–3.1)
LYMPHOCYTES NFR BLD AUTO: 37.3 % (ref 19.6–45.3)
MCH RBC QN AUTO: 30.8 PG (ref 26.6–33)
MCHC RBC AUTO-ENTMCNC: 33.5 G/DL (ref 31.5–35.7)
MCV RBC AUTO: 91.9 FL (ref 79–97)
MONOCYTES # BLD AUTO: 0.62 10*3/MM3 (ref 0.1–0.9)
MONOCYTES NFR BLD AUTO: 4.6 % (ref 5–12)
NEUTROPHILS NFR BLD AUTO: 53.2 % (ref 42.7–76)
NEUTROPHILS NFR BLD AUTO: 7.15 10*3/MM3 (ref 1.7–7)
NRBC BLD AUTO-RTO: 0 /100 WBC (ref 0–0.2)
PLATELET # BLD AUTO: 312 10*3/MM3 (ref 140–450)
PMV BLD AUTO: 9.6 FL (ref 6–12)
RBC # BLD AUTO: 4.42 10*6/MM3 (ref 3.77–5.28)
WBC NRBC COR # BLD AUTO: 13.44 10*3/MM3 (ref 3.4–10.8)

## 2025-05-01 PROCEDURE — 82728 ASSAY OF FERRITIN: CPT | Performed by: INTERNAL MEDICINE

## 2025-05-01 PROCEDURE — 85025 COMPLETE CBC W/AUTO DIFF WBC: CPT | Performed by: INTERNAL MEDICINE

## 2025-05-01 PROCEDURE — 99195 PHLEBOTOMY: CPT

## 2025-05-01 PROCEDURE — 36415 COLL VENOUS BLD VENIPUNCTURE: CPT

## 2025-05-01 PROCEDURE — 25810000003 SODIUM CHLORIDE 0.9 % SOLUTION: Performed by: INTERNAL MEDICINE

## 2025-05-01 RX ORDER — SODIUM CHLORIDE 9 MG/ML
250 INJECTION, SOLUTION INTRAVENOUS ONCE
Status: COMPLETED | OUTPATIENT
Start: 2025-05-01 | End: 2025-05-01

## 2025-05-01 RX ORDER — SODIUM CHLORIDE 9 MG/ML
250 INJECTION, SOLUTION INTRAVENOUS ONCE
Status: CANCELLED | OUTPATIENT
Start: 2025-05-01

## 2025-05-01 RX ORDER — SODIUM CHLORIDE 9 MG/ML
250 INJECTION, SOLUTION INTRAVENOUS ONCE
OUTPATIENT
Start: 2025-05-08

## 2025-05-01 RX ADMIN — SODIUM CHLORIDE 250 ML: 9 INJECTION, SOLUTION INTRAVENOUS at 16:13

## 2025-05-01 NOTE — PROGRESS NOTES
"Chief Complaint  Cough (Cough started 2 weeks ago and was diagnosed with flu a week ago, when she coughs or tries to lay down her left side hurts really bad, what medications can she take before her liver biopsy Monday or would she need to reschedule that )    Subjective      Minh Drake is a 39 y.o. female who presents to [facility]     Presenting with 2 weeks of cough. Was diagnosed with flu 1 week ago.   Her left side hurts when she tries to lay down and when she coughs.   She is scheduled to have a liver biopsy so she is wondering what to do.     Objective   Vital Signs:   Vitals:    03/21/25 1306   BP: 138/80   BP Location: Left arm   Patient Position: Sitting   Cuff Size: Adult   Pulse: 87   Resp: 18   Temp: 98.4 °F (36.9 °C)   TempSrc: Temporal   SpO2: 98%   Weight: 83.5 kg (184 lb)   Height: 161 cm (63.39\")     Body mass index is 32.19 kg/m².    Wt Readings from Last 3 Encounters:   04/28/25 84.2 kg (185 lb 9.6 oz)   03/21/25 83.5 kg (184 lb)   03/14/25 85.3 kg (188 lb)     BP Readings from Last 3 Encounters:   04/28/25 118/78   03/21/25 138/80   03/14/25 131/98       Health Maintenance   Topic Date Due    Hepatitis B (1 of 3 - 19+ 3-dose series) Never done    INFLUENZA VACCINE  07/01/2025    LIPID PANEL  10/03/2025    ANNUAL PHYSICAL  11/22/2025    PAP SMEAR  12/07/2025    COLORECTAL CANCER SCREENING  12/12/2031    TDAP/TD VACCINES (3 - Td or Tdap) 12/23/2034    HEPATITIS C SCREENING  Completed    COVID-19 Vaccine  Completed    Pneumococcal Vaccine 0-49  Completed       Physical Exam  Vitals reviewed.   Constitutional:       Appearance: Normal appearance. She is well-developed.   HENT:      Head: Normocephalic and atraumatic.      Mouth/Throat:      Pharynx: No oropharyngeal exudate.   Eyes:      Conjunctiva/sclera: Conjunctivae normal.      Pupils: Pupils are equal, round, and reactive to light.   Neck:      Thyroid: No thyromegaly or thyroid tenderness. "   Cardiovascular:      Rate and Rhythm: Normal rate and regular rhythm.      Heart sounds: No murmur heard.     No friction rub. No gallop.   Pulmonary:      Effort: Pulmonary effort is normal.      Breath sounds: Normal breath sounds. No wheezing or rhonchi.   Lymphadenopathy:      Cervical: No cervical adenopathy.   Skin:     General: Skin is warm and dry.   Neurological:      Mental Status: She is alert and oriented to person, place, and time.   Psychiatric:         Mood and Affect: Affect normal.          Result Review :  The following data was reviewed by: Lucy Lockhart MD on 03/21/2025:         Procedures          Assessment & Plan  Subacute cough  Will prescribe Tessalon pearles for cough. Discussed that the cough after a viral illness can last for several weeks.                       FOLLOW UP  No follow-ups on file.  Patient was given instructions and counseling regarding her condition or for health maintenance advice. Please see specific information pulled into the AVS if appropriate.       Lucy Lockhart MD  05/01/25  09:03 EDT    CURRENT & DISCONTINUED MEDICATIONS  Current Outpatient Medications   Medication Instructions    acetaminophen (TYLENOL) 325 mg, Every 4 Hours PRN    busPIRone (BUSPAR) 5 mg, Oral, 3 Times Daily    cetirizine (ZYRTEC) 10 mg, Oral, Daily    DULoxetine (CYMBALTA) 30 mg, Oral, Daily    hydrOXYzine (ATARAX) 25 mg, Oral, Every Night at Bedtime    Melatonin 10 mg, Oral, Nightly PRN    methocarbamol (ROBAXIN) 500 mg, Oral, 4 Times Daily    naproxen (NAPROSYN) 500 mg, Oral, 2 Times Daily With Meals    NuvaRing 0.12-0.015 MG/24HR vaginal ring 1 each, Vaginal, Every 28 Days, Insert vaginally and leave in place for 3 consecutive weeks, then remove for 1 week.    Triamcinolone Acetonide (Nasacort Allergy 24HR) 55 MCG/ACT nasal inhaler 2 sprays, Nasal, Daily    vitamin B-12 (CYANOCOBALAMIN) 1,000 mcg, Daily    vitamin D (ERGOCALCIFEROL) 50,000 Units, Oral, Weekly     vitamin E 100 Units, Daily       Medications Discontinued During This Encounter   Medication Reason    APAP LIQ 325MG/5ML (Acetaminophen)     triamcinolone (KENALOG) 0.1 % ointment

## 2025-05-21 ENCOUNTER — HOSPITAL ENCOUNTER (OUTPATIENT)
Dept: MAMMOGRAPHY | Facility: HOSPITAL | Age: 40
Discharge: HOME | End: 2025-05-21
Admitting: OBSTETRICS & GYNECOLOGY
Payer: COMMERCIAL

## 2025-05-21 DIAGNOSIS — Z91.89 INCREASED RISK OF BREAST CANCER: ICD-10-CM

## 2025-05-21 DIAGNOSIS — Z12.31 ENCOUNTER FOR SCREENING MAMMOGRAM FOR MALIGNANT NEOPLASM OF BREAST: ICD-10-CM

## 2025-05-21 PROCEDURE — 77067 SCR MAMMO BI INCL CAD: CPT

## 2025-05-21 PROCEDURE — 77063 BREAST TOMOSYNTHESIS BI: CPT

## 2025-06-02 ENCOUNTER — HOSPITAL ENCOUNTER (OUTPATIENT)
Dept: INFUSION THERAPY | Facility: HOSPITAL | Age: 40
Discharge: HOME | End: 2025-06-02
Admitting: INTERNAL MEDICINE
Payer: COMMERCIAL

## 2025-06-02 VITALS
HEART RATE: 108 BPM | BODY MASS INDEX: 32.93 KG/M2 | DIASTOLIC BLOOD PRESSURE: 81 MMHG | TEMPERATURE: 98.5 F | WEIGHT: 185.85 LBS | HEIGHT: 63 IN | RESPIRATION RATE: 19 BRPM | SYSTOLIC BLOOD PRESSURE: 140 MMHG | OXYGEN SATURATION: 99 %

## 2025-06-02 DIAGNOSIS — E83.110 HEMOCHROMATOSIS ASSOCIATED WITH MUTATION IN HFE GENE: Primary | ICD-10-CM

## 2025-06-02 LAB
BASOPHILS # BLD AUTO: 0.1 10*3/MM3 (ref 0–0.2)
BASOPHILS NFR BLD AUTO: 0.9 % (ref 0–1.5)
DEPRECATED RDW RBC AUTO: 43.6 FL (ref 37–54)
EOSINOPHIL # BLD AUTO: 0.45 10*3/MM3 (ref 0–0.4)
EOSINOPHIL NFR BLD AUTO: 3.9 % (ref 0.3–6.2)
ERYTHROCYTE [DISTWIDTH] IN BLOOD BY AUTOMATED COUNT: 12.5 % (ref 12.3–15.4)
FERRITIN SERPL-MCNC: 112.3 NG/ML (ref 13–150)
HCT VFR BLD AUTO: 38 % (ref 34–46.6)
HGB BLD-MCNC: 12.4 G/DL (ref 12–15.9)
IMM GRANULOCYTES # BLD AUTO: 0.05 10*3/MM3 (ref 0–0.05)
IMM GRANULOCYTES NFR BLD AUTO: 0.4 % (ref 0–0.5)
LYMPHOCYTES # BLD AUTO: 3.97 10*3/MM3 (ref 0.7–3.1)
LYMPHOCYTES NFR BLD AUTO: 34 % (ref 19.6–45.3)
MCH RBC QN AUTO: 30.6 PG (ref 26.6–33)
MCHC RBC AUTO-ENTMCNC: 32.6 G/DL (ref 31.5–35.7)
MCV RBC AUTO: 93.8 FL (ref 79–97)
MONOCYTES # BLD AUTO: 0.74 10*3/MM3 (ref 0.1–0.9)
MONOCYTES NFR BLD AUTO: 6.3 % (ref 5–12)
NEUTROPHILS NFR BLD AUTO: 54.5 % (ref 42.7–76)
NEUTROPHILS NFR BLD AUTO: 6.35 10*3/MM3 (ref 1.7–7)
NRBC BLD AUTO-RTO: 0 /100 WBC (ref 0–0.2)
PLATELET # BLD AUTO: 280 10*3/MM3 (ref 140–450)
PMV BLD AUTO: 9.4 FL (ref 6–12)
RBC # BLD AUTO: 4.05 10*6/MM3 (ref 3.77–5.28)
WBC NRBC COR # BLD AUTO: 11.66 10*3/MM3 (ref 3.4–10.8)

## 2025-06-02 PROCEDURE — 36415 COLL VENOUS BLD VENIPUNCTURE: CPT

## 2025-06-02 PROCEDURE — 82728 ASSAY OF FERRITIN: CPT | Performed by: INTERNAL MEDICINE

## 2025-06-02 PROCEDURE — 85025 COMPLETE CBC W/AUTO DIFF WBC: CPT | Performed by: INTERNAL MEDICINE

## 2025-06-02 RX ORDER — SODIUM CHLORIDE 9 MG/ML
250 INJECTION, SOLUTION INTRAVENOUS ONCE
Status: DISCONTINUED | OUTPATIENT
Start: 2025-06-02 | End: 2025-06-04 | Stop reason: HOSPADM

## 2025-06-02 RX ORDER — SODIUM CHLORIDE 9 MG/ML
250 INJECTION, SOLUTION INTRAVENOUS ONCE
OUTPATIENT
Start: 2025-06-09

## 2025-06-24 NOTE — PROGRESS NOTES
Patient Name: Minh [facility]anner [facility]   Visit Date: 06/25/2025   Patient ID: [id number]  Provider: KYUNG Aguilar    Sex: female  Location:  Location Address:  Location Phone: [phone]  [place] [address] [phone]    YOB: 1985  Age: 39 y.o.   Primary Care Provider Jackelyn Helton APRN      Referring Provider: No ref. provider found        Chief Complaint  Fatty Liver    History of Present Illness    Patient initially presented 9/15/2022 with elevated LFTs, reported having alcohol maybe once a month.  Ultrasound of the liver in August showed fatty liver and common bile duct 6.4 mm with gallbladder, no gallstones seen  MRI of the abdomen with and without contrast MRCP 10/14/2022: Significant fatty liver, enlarged to 21 cm, CBD 4 mm, trace bilateral pleural effusions-patient advised to follow-up with primary care for this     Liver work-up negative other than ferritin 842, iron saturation was normal, iron was normal, as a precaution hemochromatosis gene test--  1 gene detected, heterozygous finding usually not associated with increased risk of clinical symptoms     Colonoscopy 12/12/2023: Normal colonoscopy, good prep  Random colon biopsies were negative  Last liver ultrasound 5/1/2024:Hepatic steatosis otherwise negative     FibroScan 8/16/2024: Moderate to severe liver fat, F3  Repeat FibroScan 2/14/2025 showed F4 and moderate to severe liver fat    Patient was last seen 3/11/2025, she reported getting a second opinion with hematology due to elevated ferritin, patient felt better after phlebotomy and reported no further phlebotomy was recommended.  Due to discordant results of FibroScan liver biopsy recommended    Liver biopsy 3/24/25 shows mild to moderate steatohepatitis (MORENO), fibrosis stage I   Normal iron stain       History of Present Illness  The patient presents for evaluation of fatty liver disease.    She has scheduled an appointment with a hepatologist, Dr. Terrazas, on  07/09/2025. She is currently adhering to a low-carbohydrate diet and has resumed her coffee intake. She reports no abdominal pain or discomfort and maintains regular bowel movements. She is not experiencing any episodes of diarrhea. She is also inquiring about the timing of her next ultrasound. Her last ultrasound was in 11/2024. She has been supplementing her diet with vitamin E, taking two pills daily.    She underwent a phlebotomy procedure in 05/2025, which was followed by a blood draw in 06/2025. The results were within normal limits, so she did not undergo another phlebotomy. She is scheduled for her next phlebotomy on 07/01/2025, during which another blood draw will be performed. If the results are normal, the phlebotomy will be skipped. She has appointments scheduled up until 10/01/2025 and a follow-up visit on 10/22/2025. She is under the care of hematology, Dr. John Banegas at [facility], who has agreed to monthly checks. She reports feeling better post-phlebotomy.    SOCIAL HISTORY  Marital Status:   Diet: Low carb diet  Coffee/Tea/Caffeine-containing Drinks: Drinks coffee      Past Medical History:   Diagnosis Date    Allergic rhinitis     Anxiety     Increased dosage of buspirone from March 2025    Asthma     4th grade    Broken bones     Elementary    Hemochromatosis     History of medical problems     hemochromatosis    Migraine headache     Sinus trouble     Strep throat 07/06/2024       Past Surgical History:   Procedure Laterality Date    COLONOSCOPY N/A 12/12/2023    Procedure: COLONOSCOPY WITH BIOPSIES;  Surgeon: Tee Melo MD;  [facility] ENDOSCOPY;  Service: Gastroenterology;  Laterality: N/A;  NORMAL    WISDOM TOOTH EXTRACTION         Allergies   Allergen Reactions    Bee Pollen-Propolis-Royaljelly Unknown - Low Severity    Brompheniramine-Pseudoeph Rash    Cephalexin Rash    Propylene Glycol Unknown - Low Severity     Found during allergy testing    Titanium  "Unknown - Low Severity     Found during allergy testing       Family History   Problem Relation Age of Onset    Prostate cancer Father     Cataracts Father     Cancer Father         Prostate Cancer    Breast cancer Sister 44    Asthma Sister     Cancer Sister         Breast Cancer    Colon cancer Neg Hx     Ovarian cancer Neg Hx     Uterine cancer Neg Hx         Social History     Tobacco Use    Smoking status: Never     Passive exposure: Never    Smokeless tobacco: Never   Vaping Use    Vaping status: Never Used   Substance Use Topics    Alcohol use: Never    Drug use: Never       Objective     Vital Signs:   /99 (BP Location: Left arm, Patient Position: Sitting, Cuff Size: Adult)   Pulse 96   Ht 160 cm (63\")   Wt 82.6 kg (182 lb)   BMI 32.24 kg/m²       Physical Exam  Constitutional:       General: The patient is not in acute distress.     Appearance: Normal appearance.   HENT:      Head: Normocephalic and atraumatic.      Nose: Nose normal.   Pulmonary:      Effort: Pulmonary effort is normal. No respiratory distress.   Abdominal:      General: Abdomen is flat.      Palpations: Abdomen is soft. There is no mass.      Tenderness: There is no abdominal tenderness. There is no guarding.   Musculoskeletal:      Cervical back: Neck supple.      Right lower leg: No edema.      Left lower leg: No edema.   Skin:     General: Skin is warm and dry.   Neurological:      General: No focal deficit present.      Mental Status: The patient is alert and oriented to person, place, and time.      Gait: Gait normal.   Psychiatric:         Mood and Affect: Mood normal.         Speech: Speech normal.         Behavior: Behavior normal.         Thought Content: Thought content normal.     Result Review :   The following data was reviewed by: KYUNG Aguilar on 06/25/2025:    CBC w/diff          3/21/2025    10:00 5/1/2025    15:14 6/2/2025    14:35   CBC w/Diff   WBC 9.39  13.44  11.66    RBC 4.45  4.42  4.05  "   Hemoglobin 13.5  13.6  12.4    Hematocrit 39.5  40.6  38.0    MCV 88.8  91.9  93.8    MCH 30.3  30.8  30.6    MCHC 34.2  33.5  32.6    RDW 11.7  12.3  12.5    Platelets 335  312  280    Neutrophil Rel %  53.2  54.5    Immature Granulocyte Rel %  0.2  0.4    Lymphocyte Rel %  37.3  34.0    Monocyte Rel %  4.6  6.3    Eosinophil Rel %  3.8  3.9    Basophil Rel %  0.9  0.9      CMP          9/9/2024    10:34 11/22/2024    11:46 3/21/2025    10:00   CMP   Glucose  85  126    BUN  7  11    Creatinine  0.57  0.62    EGFR  119.5  117.1    Sodium  138  137    Potassium  4.1  4.0    Chloride  103  105    Calcium  9.3  9.7    Total Protein 7.9  8.0  7.7    Albumin 4.4  4.4  3.9    Globulin  3.6  3.8    Total Bilirubin 0.2  0.2  0.3    Alkaline Phosphatase 52  61  63    AST (SGOT) 39  96  74    ALT (SGPT) 29  55  52    Albumin/Globulin Ratio  1.2  1.0    BUN/Creatinine Ratio  12.3  17.7    Anion Gap  13.1  8.3        Liver Workup   A-1 Antitrypsin   Date Value Ref Range Status   09/15/2022 211 (H) 100 - 188 mg/dL Final     dsDNA   Date Value Ref Range Status   09/15/2022 Negative Negative Final     Expanded ANSHU Screen   Date Value Ref Range Status   09/15/2022 Negative Negative Final     Smooth Muscle Ab   Date Value Ref Range Status   09/15/2022 9 0 - 19 Units Final     Comment:                      Negative                     0 - 19                   Weak positive               20 - 30                   Moderate to strong positive     >30   Actin Antibodies are found in 52-85% of patients with   autoimmune hepatitis or chronic active hepatitis and   in 22% of patients with primary biliary cirrhosis.     Ceruloplasmin   Date Value Ref Range Status   09/15/2022 41 (H) 19 - 39 mg/dL Final     Ferritin   Date Value Ref Range Status   06/02/2025 112.30 13.00 - 150.00 ng/mL Final   03/19/2025 634 (H) 8.00 - 252.00 ng/mL Final     IgG   Date Value Ref Range Status   09/15/2022 1343 586 - 1602 mg/dL Final     IgA   Date Value Ref  Range Status   09/15/2022 219 87 - 352 mg/dL Final     IgM   Date Value Ref Range Status   09/15/2022 65 26 - 217 mg/dL Final     Iron   Date Value Ref Range Status   01/08/2025 79 37 - 145 mcg/dL Final     TIBC   Date Value Ref Range Status   01/08/2025 548 (H) 298 - 536 mcg/dL Final     Iron Saturation (TSAT)   Date Value Ref Range Status   01/08/2025 14 (L) 20 - 50 % Final     Transferrin   Date Value Ref Range Status   01/08/2025 368 (H) 200 - 360 mg/dL Final     Mitochondrial Ab   Date Value Ref Range Status   09/15/2022 <20.0 0.0 - 20.0 Units Final     Comment:                                     Negative    0.0 - 20.0                                  Equivocal  20.1 - 24.9                                  Positive         >24.9  Mitochondrial (M2) Antibodies are found in 90-96% of  patients with primary biliary cirrhosis.     Protime   Date Value Ref Range Status   03/24/2025 11.8 9.7 - 13.1 Second Final     INR   Date Value Ref Range Status   03/24/2025 1.03  Final     Comment:     Recommended INR range for Oral Anticoagulant Therapy :    STANDARD:  2.0 - 3.0  HIGH:  3.0 - 4.5    NOTE: Recommended range will be different for patients with mechanical implants.     ALPHA-FETOPROTEIN   Date Value Ref Range Status   09/15/2022 2.86 0 - 8.3 ng/mL Final     Acute HEP Panel   Hepatitis B Surface Ag   Date Value Ref Range Status   09/15/2022 Non-Reactive Non-Reactive Final     Hep A IgM   Date Value Ref Range Status   09/15/2022 Non-Reactive Non-Reactive Final     Hep B C IgM   Date Value Ref Range Status   09/15/2022 Non-Reactive Non-Reactive Final     Hepatitis C Ab   Date Value Ref Range Status   09/15/2022 Non-Reactive Non-Reactive Final               Assessment and Plan    Diagnoses and all orders for this visit:    1. Fatty liver (Primary)  Comments:  MORENO    2. Hemochromatosis carrier    3. Elevated LFTs    4. Elevated ferritin  Comments:  Recently normal post phlebotomy    5. Class 1 obesity with serious  comorbidity and body mass index (BMI) of 32.0 to 32.9 in adult, unspecified obesity type          Assessment & Plan  1. Nonalcoholic steatohepatitis (MORENO):  - Weight has decreased by 6 pounds since the last visit.  - Liver biopsy results indicate stage 1 fibrosis, which is a positive outcome.  - Not eligible for the new fatty liver medication, Rezdiffra, due to stage 1 fibrosis status.  - Continue weight loss efforts.  - Maintain a low-carbohydrate and low-sugar diet.  - Continue coffee intake.  - Continue taking vitamin E, split into two doses of 400 IU each per day.  - Confirm biopsy results have been faxed to the appropriate department.  - Schedule a follow-up appointment in 6 months to discuss the outcome of the consultation with Dr. Terrazas.      2. Phlebotomy management:  - CBC in 06/2025 was normal.  - Ferritin level recorded as 112.  - Scheduled for repeat lab on 07/01/2025, with a blood draw on the same day if needed  - Appointments scheduled up until 10/01/2025.  - Follow-up visit on 10/22/2025 with Dr. John Banegas at [facility].    Follow-up:  - 12/2025.            Follow Up   Return in about 6 months (around 12/25/2025).    Patient or patient representative verbalized consent for the use of Ambient Listening during the visit with  KYUNG Aguilar for chart documentation. 6/25/2025  17:25 EDT    Patient was given instructions and counseling regarding her condition or for health maintenance advice. Please see specific information pulled into the AVS if appropriate.

## 2025-06-25 ENCOUNTER — OFFICE VISIT (OUTPATIENT)
Dept: GASTROENTEROLOGY | Facility: CLINIC | Age: 40
End: 2025-06-25
Payer: COMMERCIAL

## 2025-06-25 VITALS
WEIGHT: 182 LBS | SYSTOLIC BLOOD PRESSURE: 134 MMHG | HEART RATE: 96 BPM | HEIGHT: 63 IN | DIASTOLIC BLOOD PRESSURE: 99 MMHG | BODY MASS INDEX: 32.25 KG/M2

## 2025-06-25 DIAGNOSIS — R79.89 ELEVATED LFTS: ICD-10-CM

## 2025-06-25 DIAGNOSIS — R79.89 ELEVATED FERRITIN: ICD-10-CM

## 2025-06-25 DIAGNOSIS — E66.811 CLASS 1 OBESITY WITH SERIOUS COMORBIDITY AND BODY MASS INDEX (BMI) OF 32.0 TO 32.9 IN ADULT, UNSPECIFIED OBESITY TYPE: ICD-10-CM

## 2025-06-25 DIAGNOSIS — Z14.8 HEMOCHROMATOSIS CARRIER: ICD-10-CM

## 2025-06-25 DIAGNOSIS — K76.0 FATTY LIVER: Primary | ICD-10-CM

## 2025-07-01 ENCOUNTER — HOSPITAL ENCOUNTER (OUTPATIENT)
Dept: INFUSION THERAPY | Facility: HOSPITAL | Age: 40
Discharge: HOME | End: 2025-07-01
Admitting: INTERNAL MEDICINE
Payer: COMMERCIAL

## 2025-07-01 ENCOUNTER — OFFICE VISIT (OUTPATIENT)
Dept: INTERNAL MEDICINE | Facility: CLINIC | Age: 40
End: 2025-07-01
Payer: COMMERCIAL

## 2025-07-01 VITALS
OXYGEN SATURATION: 99 % | HEART RATE: 112 BPM | TEMPERATURE: 98.6 F | HEIGHT: 63 IN | SYSTOLIC BLOOD PRESSURE: 138 MMHG | DIASTOLIC BLOOD PRESSURE: 97 MMHG | BODY MASS INDEX: 32.58 KG/M2 | RESPIRATION RATE: 18 BRPM | WEIGHT: 183.86 LBS

## 2025-07-01 VITALS
OXYGEN SATURATION: 96 % | RESPIRATION RATE: 18 BRPM | DIASTOLIC BLOOD PRESSURE: 80 MMHG | TEMPERATURE: 97.6 F | SYSTOLIC BLOOD PRESSURE: 122 MMHG | BODY MASS INDEX: 32.6 KG/M2 | HEIGHT: 63 IN | WEIGHT: 184 LBS | HEART RATE: 91 BPM

## 2025-07-01 DIAGNOSIS — F33.0 MAJOR DEPRESSIVE DISORDER, RECURRENT, MILD: ICD-10-CM

## 2025-07-01 DIAGNOSIS — E83.110 HEMOCHROMATOSIS ASSOCIATED WITH MUTATION IN HFE GENE: Primary | ICD-10-CM

## 2025-07-01 DIAGNOSIS — F41.1 GAD (GENERALIZED ANXIETY DISORDER): ICD-10-CM

## 2025-07-01 DIAGNOSIS — G43.909 MIGRAINE WITHOUT STATUS MIGRAINOSUS, NOT INTRACTABLE, UNSPECIFIED MIGRAINE TYPE: Primary | ICD-10-CM

## 2025-07-01 DIAGNOSIS — E83.110 HEMOCHROMATOSIS ASSOCIATED WITH MUTATION IN HFE GENE: ICD-10-CM

## 2025-07-01 LAB
BASOPHILS # BLD AUTO: 0.09 10*3/MM3 (ref 0–0.2)
BASOPHILS NFR BLD AUTO: 0.7 % (ref 0–1.5)
DEPRECATED RDW RBC AUTO: 40 FL (ref 37–54)
EOSINOPHIL # BLD AUTO: 0.61 10*3/MM3 (ref 0–0.4)
EOSINOPHIL NFR BLD AUTO: 4.6 % (ref 0.3–6.2)
ERYTHROCYTE [DISTWIDTH] IN BLOOD BY AUTOMATED COUNT: 11.9 % (ref 12.3–15.4)
FERRITIN SERPL-MCNC: 116.5 NG/ML (ref 13–150)
HCT VFR BLD AUTO: 41.7 % (ref 34–46.6)
HGB BLD-MCNC: 13.7 G/DL (ref 12–15.9)
IMM GRANULOCYTES # BLD AUTO: 0.04 10*3/MM3 (ref 0–0.05)
IMM GRANULOCYTES NFR BLD AUTO: 0.3 % (ref 0–0.5)
LYMPHOCYTES # BLD AUTO: 5.39 10*3/MM3 (ref 0.7–3.1)
LYMPHOCYTES NFR BLD AUTO: 40.7 % (ref 19.6–45.3)
MCH RBC QN AUTO: 30.2 PG (ref 26.6–33)
MCHC RBC AUTO-ENTMCNC: 32.9 G/DL (ref 31.5–35.7)
MCV RBC AUTO: 91.9 FL (ref 79–97)
MONOCYTES # BLD AUTO: 0.76 10*3/MM3 (ref 0.1–0.9)
MONOCYTES NFR BLD AUTO: 5.7 % (ref 5–12)
NEUTROPHILS NFR BLD AUTO: 48 % (ref 42.7–76)
NEUTROPHILS NFR BLD AUTO: 6.34 10*3/MM3 (ref 1.7–7)
NRBC BLD AUTO-RTO: 0 /100 WBC (ref 0–0.2)
PLATELET # BLD AUTO: 326 10*3/MM3 (ref 140–450)
PMV BLD AUTO: 9.1 FL (ref 6–12)
RBC # BLD AUTO: 4.54 10*6/MM3 (ref 3.77–5.28)
WBC NRBC COR # BLD AUTO: 13.23 10*3/MM3 (ref 3.4–10.8)

## 2025-07-01 PROCEDURE — 36415 COLL VENOUS BLD VENIPUNCTURE: CPT

## 2025-07-01 PROCEDURE — 99214 OFFICE O/P EST MOD 30 MIN: CPT | Performed by: NURSE PRACTITIONER

## 2025-07-01 PROCEDURE — 82728 ASSAY OF FERRITIN: CPT | Performed by: INTERNAL MEDICINE

## 2025-07-01 PROCEDURE — 85025 COMPLETE CBC W/AUTO DIFF WBC: CPT | Performed by: INTERNAL MEDICINE

## 2025-07-01 RX ORDER — SODIUM CHLORIDE 9 MG/ML
250 INJECTION, SOLUTION INTRAVENOUS ONCE
Status: DISCONTINUED | OUTPATIENT
Start: 2025-07-01 | End: 2025-07-03 | Stop reason: HOSPADM

## 2025-07-01 RX ORDER — SUMATRIPTAN SUCCINATE 25 MG/1
TABLET ORAL
Qty: 8 TABLET | Refills: 1 | Status: SHIPPED | OUTPATIENT
Start: 2025-07-01

## 2025-07-01 RX ORDER — NAPROXEN 500 MG/1
500 TABLET ORAL 2 TIMES DAILY WITH MEALS
Qty: 60 TABLET | Refills: 2 | Status: SHIPPED | OUTPATIENT
Start: 2025-07-01

## 2025-07-01 RX ORDER — SODIUM CHLORIDE 9 MG/ML
250 INJECTION, SOLUTION INTRAVENOUS ONCE
OUTPATIENT
Start: 2025-07-08

## 2025-07-01 NOTE — PROGRESS NOTES
"Chief Complaint  Depression & Anxiety (2 month follow up ), Hemochromatosis, Elevated ferritin, and Migraine (Pain is worse on the left side of head when having migraines. Patient is wanting to know what provider suggest imaging? )      Subjective      History of Present Illness  The patient is a 39-year-old female with a medical history significant for anxiety, depression, hemochromatosis, elevated ferritin levels, and migraines.    She reports experiencing severe nightmares, which occur despite abstaining from food before bedtime. She denies any suicidal or homicidal ideations. The patient frequently uses Buspirone (BuSpar) for anxiety management, although she does not take it at night.    She has noted an increase in the frequency of her migraines, which are predominantly left-sided and tender to palpation. These migraines are associated with physical overexertion, muscle tension in the neck and shoulders, and a mild temp of 99.5°F lasting 2-3 days. Her baseline body temperature is 97°F. She manages her headaches with naproxen but is currently using ibuprofen (Advil) due to running out of naproxen. She underwent an MRI in [place] during high school.    She has a scheduled appointment with a hematologist in October 2025 and a consultation with a hepatologist on July 9, 2025.    FAMILY HISTORY  Mother: thyroid issues.  Father: stroke.         Objective   Vital Signs:   Vitals:    07/01/25 0726   BP: 122/80   BP Location: Left arm   Patient Position: Sitting   Cuff Size: Adult   Pulse: 91   Resp: 18   Temp: 97.6 °F (36.4 °C)   TempSrc: Temporal   SpO2: 96%   Weight: 83.5 kg (184 lb)   Height: 160 cm (63\")     Body mass index is 32.59 kg/m².    Wt Readings from Last 3 Encounters:   07/01/25 83.5 kg (184 lb)   06/25/25 82.6 kg (182 lb)   06/02/25 84.3 kg (185 lb 13.6 oz)     BP Readings from Last 3 Encounters:   07/01/25 122/80   06/25/25 134/99   06/02/25 140/81       Health Maintenance   Topic Date Due    Hepatitis B " (1 of 3 - 19+ 3-dose series) Never done    INFLUENZA VACCINE  07/01/2025    LIPID PANEL  10/03/2025    ANNUAL PHYSICAL  11/22/2025    PAP SMEAR  12/07/2025    COLORECTAL CANCER SCREENING  12/12/2031    TDAP/TD VACCINES (3 - Td or Tdap) 12/23/2034    HEPATITIS C SCREENING  Completed    COVID-19 Vaccine  Completed    Pneumococcal Vaccine 0-49  Completed       Physical Exam  Vitals and nursing note reviewed.   Constitutional:       General: She is not in acute distress.     Appearance: Normal appearance.   HENT:      Head: Normocephalic and atraumatic.      Right Ear: External ear normal.      Left Ear: External ear normal.      Nose: Nose normal.      Mouth/Throat:      Mouth: Mucous membranes are moist.   Eyes:      Conjunctiva/sclera: Conjunctivae normal.   Cardiovascular:      Rate and Rhythm: Normal rate and regular rhythm.      Pulses: Normal pulses.      Heart sounds: Normal heart sounds. No murmur heard.     No friction rub. No gallop.   Pulmonary:      Effort: Pulmonary effort is normal. No respiratory distress.      Breath sounds: No wheezing, rhonchi or rales.   Musculoskeletal:      Cervical back: Neck supple.      Right lower leg: No edema.      Left lower leg: No edema.   Skin:     General: Skin is warm and dry.   Neurological:      General: No focal deficit present.      Mental Status: She is alert and oriented to person, place, and time.   Psychiatric:         Mood and Affect: Mood normal.         Behavior: Behavior normal.        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 Anxiety and depression  - Severe nightmares, increased anxiety  - No suicidal or homicidal thoughts  - Using BuSpar; advised to take BuSpar at night    2. Migraines  - Left-sided, associated with neck and shoulder tension, mild fever during episodes  - Advised to avoid naproxen due to rebound headaches  - Prescription for Imitrex sent; use at onset of headache  - MRI of the brain ordered    3. Hemochromatosis  - History of hemochromatosis, elevated ferritin  - Follow-up with hematology 10/2025 and liver specialist 07/09/2025  - Reviewed previous ultrasound; no follow-up unless physical exam changes    Follow-up  - Follow-up in a few months to reassess Imitrex effectiveness and migraine frequency    Patient or patient representative verbalized consent for the use of Ambient Listening during the visit with  KYUNG Thapa for chart documentation. 7/1/2025  08:05 EDT      FOLLOW UP  Return in about 2 months (around 9/1/2025).  Patient was given instructions and counseling regarding her condition or for health maintenance advice. Please see specific information pulled into the AVS if appropriate.     KYUNG Thapa  07/01/25  08:05 EDT    CURRENT & DISCONTINUED MEDICATIONS  Current Outpatient Medications   Medication Instructions    acetaminophen (TYLENOL) 325 mg, Every 4 Hours PRN    busPIRone (BUSPAR) 5 mg, Oral, 3 Times Daily    cetirizine (ZYRTEC) 10 mg, Oral, Daily    DULoxetine (CYMBALTA) 30 mg, Oral, Daily    hydrOXYzine (ATARAX) 25 mg, Oral, Every Night at Bedtime    Melatonin 10 mg, Oral, Nightly PRN    methocarbamol (ROBAXIN) 500 mg, Oral, 4 Times Daily    naproxen (NAPROSYN) 500 mg, Oral, 2 Times Daily With Meals    NuvaRing 0.12-0.015 MG/24HR vaginal ring 1 each, Vaginal, Every 28 Days, Insert vaginally and leave in place for 3 consecutive weeks, then remove for 1 week.    SUMAtriptan (Imitrex) 25 MG tablet Take one tablet at onset of headache. May  repeat dose one time in 2 hours if headache not relieved.    Triamcinolone Acetonide (Nasacort Allergy 24HR) 55 MCG/ACT nasal inhaler 2 sprays, Nasal, Daily    vitamin B-12 (CYANOCOBALAMIN) 1,000 mcg, Daily    vitamin D (ERGOCALCIFEROL) 50,000 Units, Oral, Weekly    vitamin E 100 Units, Daily       Medications Discontinued During This Encounter   Medication Reason    naproxen (NAPROSYN) 500 MG tablet Reorder

## 2025-07-01 NOTE — ASSESSMENT & PLAN NOTE
{General Headache A/P Smartblock (Optional):[id number]}            Orders:    MRI Brain With & Without Contrast; Future

## 2025-07-07 RX ORDER — ERGOCALCIFEROL 1.25 MG/1
50000 CAPSULE, LIQUID FILLED ORAL WEEKLY
Qty: 4 CAPSULE | Refills: 6 | Status: SHIPPED | OUTPATIENT
Start: 2025-07-07

## 2025-08-01 ENCOUNTER — HOSPITAL ENCOUNTER (OUTPATIENT)
Dept: INFUSION THERAPY | Facility: HOSPITAL | Age: 40
Discharge: HOME | End: 2025-08-01
Admitting: INTERNAL MEDICINE
Payer: COMMERCIAL

## 2025-08-01 VITALS
BODY MASS INDEX: 32.58 KG/M2 | SYSTOLIC BLOOD PRESSURE: 123 MMHG | DIASTOLIC BLOOD PRESSURE: 75 MMHG | TEMPERATURE: 98.6 F | HEIGHT: 63 IN | WEIGHT: 183.86 LBS | RESPIRATION RATE: 18 BRPM | HEART RATE: 96 BPM | OXYGEN SATURATION: 99 %

## 2025-08-01 DIAGNOSIS — E83.110 HEMOCHROMATOSIS ASSOCIATED WITH MUTATION IN HFE GENE: Primary | ICD-10-CM

## 2025-08-01 LAB
BASOPHILS # BLD AUTO: 0.1 10*3/MM3 (ref 0–0.2)
BASOPHILS NFR BLD AUTO: 0.8 % (ref 0–1.5)
DEPRECATED RDW RBC AUTO: 40.5 FL (ref 37–54)
EOSINOPHIL # BLD AUTO: 0.55 10*3/MM3 (ref 0–0.4)
EOSINOPHIL NFR BLD AUTO: 4.4 % (ref 0.3–6.2)
ERYTHROCYTE [DISTWIDTH] IN BLOOD BY AUTOMATED COUNT: 11.9 % (ref 12.3–15.4)
FERRITIN SERPL-MCNC: 140 NG/ML (ref 13–150)
HCT VFR BLD AUTO: 40.2 % (ref 34–46.6)
HGB BLD-MCNC: 13.1 G/DL (ref 12–15.9)
IMM GRANULOCYTES # BLD AUTO: 0.03 10*3/MM3 (ref 0–0.05)
IMM GRANULOCYTES NFR BLD AUTO: 0.2 % (ref 0–0.5)
LYMPHOCYTES # BLD AUTO: 4.31 10*3/MM3 (ref 0.7–3.1)
LYMPHOCYTES NFR BLD AUTO: 34.5 % (ref 19.6–45.3)
MCH RBC QN AUTO: 30.3 PG (ref 26.6–33)
MCHC RBC AUTO-ENTMCNC: 32.6 G/DL (ref 31.5–35.7)
MCV RBC AUTO: 93.1 FL (ref 79–97)
MONOCYTES # BLD AUTO: 0.68 10*3/MM3 (ref 0.1–0.9)
MONOCYTES NFR BLD AUTO: 5.4 % (ref 5–12)
NEUTROPHILS NFR BLD AUTO: 54.7 % (ref 42.7–76)
NEUTROPHILS NFR BLD AUTO: 6.82 10*3/MM3 (ref 1.7–7)
NRBC BLD AUTO-RTO: 0 /100 WBC (ref 0–0.2)
PLATELET # BLD AUTO: 306 10*3/MM3 (ref 140–450)
PMV BLD AUTO: 9.3 FL (ref 6–12)
RBC # BLD AUTO: 4.32 10*6/MM3 (ref 3.77–5.28)
WBC NRBC COR # BLD AUTO: 12.49 10*3/MM3 (ref 3.4–10.8)

## 2025-08-01 PROCEDURE — 85025 COMPLETE CBC W/AUTO DIFF WBC: CPT | Performed by: INTERNAL MEDICINE

## 2025-08-01 PROCEDURE — 82728 ASSAY OF FERRITIN: CPT | Performed by: INTERNAL MEDICINE

## 2025-08-01 PROCEDURE — 36415 COLL VENOUS BLD VENIPUNCTURE: CPT

## 2025-08-01 RX ORDER — SODIUM CHLORIDE 9 MG/ML
250 INJECTION, SOLUTION INTRAVENOUS ONCE
Status: DISCONTINUED | OUTPATIENT
Start: 2025-08-01 | End: 2025-08-03 | Stop reason: HOSPADM

## 2025-08-01 RX ORDER — SODIUM CHLORIDE 9 MG/ML
250 INJECTION, SOLUTION INTRAVENOUS ONCE
OUTPATIENT
Start: 2025-08-08

## 2025-08-12 ENCOUNTER — HOSPITAL ENCOUNTER (OUTPATIENT)
Dept: MRI IMAGING | Facility: HOSPITAL | Age: 40
Discharge: HOME | End: 2025-08-12
Admitting: NURSE PRACTITIONER
Payer: COMMERCIAL

## 2025-08-12 DIAGNOSIS — G43.909 MIGRAINE WITHOUT STATUS MIGRAINOSUS, NOT INTRACTABLE, UNSPECIFIED MIGRAINE TYPE: ICD-10-CM

## 2025-08-12 PROCEDURE — 70553 MRI BRAIN STEM W/O & W/DYE: CPT

## 2025-08-12 PROCEDURE — 25510000001 GADOPICLENOL 0.5 MMOL/ML SOLUTION: Performed by: NURSE PRACTITIONER

## 2025-08-12 PROCEDURE — A9573 GADOPICLENOL 0.5 MMOL/ML SOLUTION: HCPCS | Performed by: NURSE PRACTITIONER

## 2025-08-12 RX ADMIN — GADOPICLENOL 9 ML: 485.1 INJECTION INTRAVENOUS at 15:58

## 2025-08-29 RX ORDER — SUMATRIPTAN SUCCINATE 25 MG/1
25 TABLET ORAL
Qty: 8 TABLET | Refills: 1 | Status: SHIPPED | OUTPATIENT
Start: 2025-08-29

## (undated) DEVICE — SOL IRRG H2O PL/BG 1000ML STRL

## (undated) DEVICE — SOLIDIFIER LIQLOC PLS 1500CC BT

## (undated) DEVICE — SINGLE-USE BIOPSY FORCEPS: Brand: RADIAL JAW 4

## (undated) DEVICE — Device

## (undated) DEVICE — Device: Brand: DEFENDO AIR/WATER/SUCTION AND BIOPSY VALVE